# Patient Record
Sex: FEMALE | Race: WHITE | HISPANIC OR LATINO | Employment: FULL TIME | URBAN - METROPOLITAN AREA
[De-identification: names, ages, dates, MRNs, and addresses within clinical notes are randomized per-mention and may not be internally consistent; named-entity substitution may affect disease eponyms.]

---

## 2017-11-11 ENCOUNTER — HOSPITAL ENCOUNTER (EMERGENCY)
Facility: HOSPITAL | Age: 27
Discharge: HOME | End: 2017-11-11
Attending: EMERGENCY MEDICINE | Admitting: EMERGENCY MEDICINE
Payer: COMMERCIAL

## 2017-11-11 VITALS
HEART RATE: 98 BPM | TEMPERATURE: 98.9 F | OXYGEN SATURATION: 99 % | SYSTOLIC BLOOD PRESSURE: 125 MMHG | RESPIRATION RATE: 16 BRPM | DIASTOLIC BLOOD PRESSURE: 59 MMHG

## 2017-11-11 DIAGNOSIS — N72 CERVICITIS: Primary | ICD-10-CM

## 2017-11-11 LAB
AMORPH URATE CRY URNS QL MICRO: ABNORMAL /HPF
BACTERIA UR QL AUTO: ABNORMAL /HPF
BILIRUB UR QL STRIP: NEGATIVE
CLARITY UR: CLEAR
COLOR UR: YELLOW
EXT PREG TEST URINE: NEGATIVE
GLUCOSE UR STRIP-MCNC: NEGATIVE MG/DL
HGB UR QL STRIP.AUTO: ABNORMAL
KETONES UR STRIP-MCNC: NEGATIVE MG/DL
LEUKOCYTE ESTERASE UR QL STRIP: ABNORMAL
NITRITE UR QL STRIP: NEGATIVE
NON-SQ EPI CELLS URNS QL MICRO: ABNORMAL /HPF
PH UR STRIP.AUTO: 6 [PH] (ref 4.5–8)
PROT UR STRIP-MCNC: NEGATIVE MG/DL
RBC #/AREA URNS AUTO: ABNORMAL /HPF
SP GR UR STRIP.AUTO: 1.02 (ref 1–1.03)
UROBILINOGEN UR QL STRIP.AUTO: 1 E.U./DL
WBC #/AREA URNS AUTO: ABNORMAL /HPF

## 2017-11-11 PROCEDURE — 81001 URINALYSIS AUTO W/SCOPE: CPT

## 2017-11-11 PROCEDURE — 81025 URINE PREGNANCY TEST: CPT | Performed by: EMERGENCY MEDICINE

## 2017-11-11 PROCEDURE — 96372 THER/PROPH/DIAG INJ SC/IM: CPT

## 2017-11-11 PROCEDURE — 99284 EMERGENCY DEPT VISIT MOD MDM: CPT

## 2017-11-11 PROCEDURE — 87491 CHLMYD TRACH DNA AMP PROBE: CPT | Performed by: EMERGENCY MEDICINE

## 2017-11-11 PROCEDURE — 81002 URINALYSIS NONAUTO W/O SCOPE: CPT | Performed by: EMERGENCY MEDICINE

## 2017-11-11 PROCEDURE — 87591 N.GONORRHOEAE DNA AMP PROB: CPT | Performed by: EMERGENCY MEDICINE

## 2017-11-11 RX ORDER — IBUPROFEN 600 MG/1
600 TABLET ORAL EVERY 6 HOURS PRN
Qty: 30 TABLET | Refills: 0 | Status: SHIPPED | OUTPATIENT
Start: 2017-11-11 | End: 2018-07-23

## 2017-11-11 RX ORDER — AZITHROMYCIN 250 MG/1
1000 TABLET, FILM COATED ORAL ONCE
Status: COMPLETED | OUTPATIENT
Start: 2017-11-11 | End: 2017-11-11

## 2017-11-11 RX ADMIN — AZITHROMYCIN 1000 MG: 250 TABLET, FILM COATED ORAL at 12:25

## 2017-11-11 RX ADMIN — WATER 250 MG: 1 INJECTION INTRAMUSCULAR; INTRAVENOUS; SUBCUTANEOUS at 12:24

## 2017-11-11 NOTE — ED PROVIDER NOTES
History  Chief Complaint   Patient presents with    Abdominal Pain     lower abd pain with vaginal discharge x4days  History provided by:  Patient   used: No    Vaginal Discharge   Quality:  Malodorous and white  Severity:  Moderate  Onset quality:  Sudden  Duration:  4 days  Timing:  Constant  Progression:  Unchanged  Chronicity:  New  Context: spontaneously    Relieved by:  Nothing  Worsened by:  Nothing  Ineffective treatments:  None tried  Associated symptoms: abdominal pain    Associated symptoms: no dysuria, no fever, no genital lesions, no nausea, no rash, no urinary frequency, no vaginal itching and no vomiting    Risk factors: unprotected sex    Risk factors comment:  Prior chlamydia 6 years ago  Prior to Admission Medications   Prescriptions Last Dose Informant Patient Reported? Taking? diphenhydrAMINE (BENADRYL) 25 mg tablet   No No   Sig: Take 1 tablet by mouth every 6 (six) hours as needed for itching for up to 30 days   famotidine (PEPCID) 20 mg tablet   No No   Sig: Take 1 tablet by mouth 2 (two) times a day   hydrocortisone 1 % cream   No No   Sig: Apply 1 application topically 2 (two) times a day      Facility-Administered Medications: None       Past Medical History:   Diagnosis Date    No known health problems        Past Surgical History:   Procedure Laterality Date    NO PAST SURGERIES         History reviewed  No pertinent family history  I have reviewed and agree with the history as documented  Social History   Substance Use Topics    Smoking status: Current Every Day Smoker     Packs/day: 0 20    Smokeless tobacco: Never Used    Alcohol use Yes      Comment: ocassionl        Review of Systems   Constitutional: Negative for chills, diaphoresis and fever  HENT: Negative for congestion and sore throat  Respiratory: Negative for cough, chest tightness and shortness of breath  Cardiovascular: Negative for chest pain     Gastrointestinal: Positive for abdominal pain  Negative for diarrhea, nausea and vomiting  Genitourinary: Positive for pelvic pain and vaginal discharge  Negative for difficulty urinating, dysuria, flank pain and vaginal bleeding  Skin: Negative for rash  Neurological: Negative for headaches  All other systems reviewed and are negative  Physical Exam  ED Triage Vitals [11/11/17 1120]   Temperature Pulse Respirations Blood Pressure SpO2   98 9 °F (37 2 °C) 98 16 125/59 99 %      Temp Source Heart Rate Source Patient Position - Orthostatic VS BP Location FiO2 (%)   Temporal -- Sitting Right arm --      Pain Score       7           Orthostatic Vital Signs  Vitals:    11/11/17 1120   BP: 125/59   Pulse: 98   Patient Position - Orthostatic VS: Sitting       Physical Exam   Constitutional: She appears well-developed and well-nourished  She is cooperative  Non-toxic appearance  She does not have a sickly appearance  She does not appear ill  No distress  HENT:   Head: Normocephalic and atraumatic  Right Ear: Hearing normal  No drainage or swelling  Left Ear: Hearing normal  No drainage or swelling  Mouth/Throat: Mucous membranes are normal    Eyes: Conjunctivae and lids are normal  Right eye exhibits no discharge  Left eye exhibits no discharge  Neck: Trachea normal and normal range of motion  No JVD present  Cardiovascular: Normal rate and normal pulses  Pulmonary/Chest: Effort normal  No stridor  No respiratory distress  Abdominal: Soft  Normal appearance  She exhibits no ascites and no mass  There is no hepatosplenomegaly  There is no tenderness  There is no rebound, no guarding and no CVA tenderness  Genitourinary: Uterus normal  Pelvic exam was performed with patient supine  There is no rash, tenderness, lesion or injury on the right labia  There is no rash, tenderness, lesion or injury on the left labia  Cervix exhibits discharge and friability  Cervix exhibits no motion tenderness   No tenderness in the vagina  No vaginal discharge found  Musculoskeletal: Normal range of motion  She exhibits no edema, tenderness or deformity  Lymphadenopathy:        Right: No inguinal adenopathy present  Left: No inguinal adenopathy present  Neurological: She is alert  She has normal strength  No cranial nerve deficit or sensory deficit  She exhibits normal muscle tone  Coordination and gait normal  GCS eye subscore is 4  GCS verbal subscore is 5  GCS motor subscore is 6  Skin: Skin is warm, dry and intact  No rash noted  She is not diaphoretic  No pallor  Psychiatric: She has a normal mood and affect  Her speech is normal and behavior is normal  Judgment and thought content normal  Cognition and memory are normal    Nursing note and vitals reviewed        ED Medications  Medications   cefTRIAXone (ROCEPHIN) 250 mg in sterile water IM only syringe (not administered)   azithromycin (ZITHROMAX) tablet 1,000 mg (not administered)       Diagnostic Studies  Results Reviewed     Procedure Component Value Units Date/Time    Urine Microscopic [[id number]]  (Abnormal) Collected:  11/11/17 1257    Lab Status:  Final result Specimen:  Urine from Urine, Clean Catch Updated:  11/11/17 1203     RBC, UA 1-2 (A) /hpf      WBC, UA 0-1 (A) /hpf      Epithelial Cells Innumerable (A) /hpf      Bacteria, UA Innumerable (A) /hpf      AMORPH URATES Occasional /hpf     Chlamydia/GC amplified DNA by PCR [[id number]] Collected:  11/11/17 1200    Lab Status:  No result Specimen:  Genital from Cervix     POCT urinalysis dipstick [[id number]]  (Abnormal) Resulted:  11/11/17 1144    Lab Status:  Final result Updated:  11/11/17 1144    POCT pregnancy, urine [[id number]]  (Normal) Resulted:  11/11/17 1144    Lab Status:  Final result Updated:  11/11/17 1144     EXT PREG TEST UR (Ref: Negative) NEGATIVE    ED Urine Macroscopic [[id number]]  (Abnormal) Collected:  11/11/17 1257    Lab Status:  Final result Specimen:  Urine Updated:  11/11/17 1142     Color, UA Yellow     Clarity, UA Clear     pH, UA 6 0     Leukocytes, UA Trace (A)     Nitrite, UA Negative     Protein, UA Negative mg/dl      Glucose, UA Negative mg/dl      Ketones, UA Negative mg/dl      Urobilinogen, UA 1 0 E U /dl      Bilirubin, UA Negative     Blood, UA Trace (A)     Specific [place], UA 1 020    Narrative:       CLINITEK RESULT                 No orders to display              Procedures  Procedures       Phone Contacts  ED Phone Contact    ED Course  ED Course                                MDM  Number of Diagnoses or Management Options  Cervicitis:   Diagnosis management comments: There is no odor to the discharge  Cervix is friable and there is a whitish discharge  No cervical motion tenderness  Treat presumptively for possibility of chlamydia or gonorrhea  Ibuprofen  Abdominal exam is benign the patient is afebrile nontoxic-appearing  She has an OBGYN doctor to follow up with at [facility] and but I gave her the number to the [facility] for follow-up as needed  Cultures are pending and she is aware that they can take a few days to come back  Amount and/or Complexity of Data Reviewed  Clinical lab tests: ordered and reviewed    Patient Progress  Patient progress: stable    CritCare Time    Disposition  Final diagnoses:   Cervicitis     Time reflects when diagnosis was documented in both MDM as applicable and the Disposition within this note     Time User Action Codes Description Comment    11/11/2017 11:58 AM Joaquin Seth Add [N72] Cervicitis       ED Disposition     ED Disposition Condition Comment    Discharge  Krzysztof Cobian discharge to home/self care      Condition at discharge: Good        Follow-up Information     Follow up With Specialties Details Why [address]   Schedule an appointment as soon as possible for a visit in 2 days If symptoms worsen [address] [address] [phone] Patient's Medications   Discharge Prescriptions    IBUPROFEN (MOTRIN) 600 MG TABLET    Take 1 tablet by mouth every 6 (six) hours as needed for moderate pain for up to 30 doses       Start Date: 11/11/2017End Date: --       Order Dose: 600 mg       Quantity: 30 tablet    Refills: 0     No discharge procedures on file      ED Provider  Electronically Signed by           Sarah Warner MD  11/11/17 6226

## 2017-11-11 NOTE — ED NOTES
Pelvic is set up and dr Marsha Breen at bedside      [facility]  11/11/17 0095

## 2017-11-11 NOTE — DISCHARGE INSTRUCTIONS
Cervicitis   WHAT YOU NEED TO KNOW:   Cervicitis inflammation of your cervix  Your cervix is at the bottom of your uterus where it opens into your vagina  DISCHARGE INSTRUCTIONS:   Medicines:   · Antibiotics: This medicine helps kill the bacteria causing cervicitis  Take them as directed  · Take your medicine as directed  Contact your healthcare provider if you think your medicine is not helping or if you have side effects  Tell him of her if you are allergic to any medicine  Keep a list of the medicines, vitamins, and herbs you take  Include the amounts, and when and why you take them  Bring the list or the pill bottles to follow-up visits  Carry your medicine list with you in case of an emergency  Follow up with your healthcare provider or gynecologist as directed: You may need to return to have your cervix checked or more tests done  Write down your questions so you remember to ask them during your visits  Activity:  You may need to stop having sex until after you finish taking medicine to treat your condition  If you had other procedures to treat your condition, you may need to stop having sex for some time  Ask your healthcare provider or gynecologist when you can have sex or return to your normal activities  Prevent cervicitis:   · Avoid products that can cause irritation:  Do not douche unless your healthcare provider or gynecologist has told you to  Do not use spermicides if they caused symptoms in the past      · Use a condom:  Use a latex condom every time you have sex  If you are allergic to latex, use a nonlatex condom  · Limit your sexual partners: Your risk of getting an STI is decreased if you have fewer sexual partners  Do not have sex with someone who has or is being treated for a STI  · Talk to your sexual partners: If you have a STI, tell your recent sexual partners  Tell them to see a healthcare provider for testing and treatment to help stop the spread of infection    Contact your healthcare provider or gynecologist if:   · You are spotting blood from your vagina and it is not time for your period  · You have yellow or green discharge coming from your vagina after you start treatment  · You have abdominal pain  · You have a fever  · You think or know you are pregnant  · Your symptoms do not go away 2 to 4 weeks after treatment  · You have questions or concerns about your condition or care  Return to the emergency department if:   · You have bleeding from your vagina that does not stop and it is not time for your period  © 2017 [address] [facility] Information is for End User's use only and may not be sold, redistributed or otherwise used for commercial purposes  All illustrations and images included in CareNotes® are the copyrighted property of [employer]  or [employer]  The above information is an  only  It is not intended as medical advice for individual conditions or treatments  Talk to your doctor, nurse or pharmacist before following any medical regimen to see if it is safe and effective for you

## 2017-11-13 ENCOUNTER — GENERIC CONVERSION - ENCOUNTER (OUTPATIENT)
Dept: OTHER | Facility: OTHER | Age: 27
End: 2017-11-13

## 2017-11-13 LAB
CHLAMYDIA DNA CVX QL NAA+PROBE: NORMAL
N GONORRHOEA DNA GENITAL QL NAA+PROBE: NORMAL

## 2017-12-13 ENCOUNTER — APPOINTMENT (EMERGENCY)
Dept: RADIOLOGY | Facility: HOSPITAL | Age: 27
End: 2017-12-13
Payer: COMMERCIAL

## 2017-12-13 ENCOUNTER — HOSPITAL ENCOUNTER (EMERGENCY)
Facility: HOSPITAL | Age: 27
Discharge: HOME | End: 2017-12-13
Payer: COMMERCIAL

## 2017-12-13 VITALS
HEART RATE: 93 BPM | DIASTOLIC BLOOD PRESSURE: 57 MMHG | TEMPERATURE: 98.2 F | SYSTOLIC BLOOD PRESSURE: 120 MMHG | WEIGHT: 110 LBS | RESPIRATION RATE: 16 BRPM | OXYGEN SATURATION: 99 %

## 2017-12-13 DIAGNOSIS — S93.401A RIGHT ANKLE SPRAIN: Primary | ICD-10-CM

## 2017-12-13 PROCEDURE — 73610 X-RAY EXAM OF ANKLE: CPT

## 2017-12-13 PROCEDURE — 99283 EMERGENCY DEPT VISIT LOW MDM: CPT

## 2017-12-13 RX ORDER — ACETAMINOPHEN 325 MG/1
650 TABLET ORAL ONCE
Status: COMPLETED | OUTPATIENT
Start: 2017-12-13 | End: 2017-12-13

## 2017-12-13 RX ADMIN — ACETAMINOPHEN 650 MG: 325 TABLET, FILM COATED ORAL at 19:49
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
## 2017-12-14 NOTE — DISCHARGE INSTRUCTIONS

## 2018-01-17 NOTE — MISCELLANEOUS
Message  patient recently seen in our ED for questionable STD expsoure  she has an OBGYN doctor at [facility]  she is to call and make an apt with them      Signatures   Electronically signed by : Carolina Carbajal RN; Nov 13 2017  9:56AM EST                       (Author)

## 2018-07-23 ENCOUNTER — HOSPITAL ENCOUNTER (EMERGENCY)
Facility: HOSPITAL | Age: 28
Discharge: HOME | End: 2018-07-23
Attending: EMERGENCY MEDICINE | Admitting: EMERGENCY MEDICINE
Payer: COMMERCIAL

## 2018-07-23 ENCOUNTER — APPOINTMENT (EMERGENCY)
Dept: CT IMAGING | Facility: HOSPITAL | Age: 28
End: 2018-07-23
Payer: COMMERCIAL

## 2018-07-23 VITALS
TEMPERATURE: 96.9 F | OXYGEN SATURATION: 99 % | RESPIRATION RATE: 16 BRPM | SYSTOLIC BLOOD PRESSURE: 105 MMHG | DIASTOLIC BLOOD PRESSURE: 66 MMHG | HEART RATE: 76 BPM | WEIGHT: 114.19 LBS

## 2018-07-23 DIAGNOSIS — N39.0 URINARY TRACT INFECTION: ICD-10-CM

## 2018-07-23 DIAGNOSIS — G43.909 MIGRAINE WITHOUT STATUS MIGRAINOSUS, NOT INTRACTABLE, UNSPECIFIED MIGRAINE TYPE: Primary | ICD-10-CM

## 2018-07-23 LAB
ALBUMIN SERPL BCP-MCNC: 4.6 G/DL (ref 3–5.2)
ALP SERPL-CCNC: 53 U/L (ref 43–122)
ALT SERPL W P-5'-P-CCNC: 52 U/L (ref 9–52)
ANION GAP SERPL CALCULATED.3IONS-SCNC: 9 MMOL/L (ref 5–14)
AST SERPL W P-5'-P-CCNC: 35 U/L (ref 14–36)
BACTERIA UR QL AUTO: ABNORMAL /HPF
BASOPHILS # BLD AUTO: 0 THOUSANDS/ΜL (ref 0–0.1)
BASOPHILS NFR BLD AUTO: 1 % (ref 0–1)
BILIRUB SERPL-MCNC: 0.6 MG/DL
BILIRUB UR QL STRIP: NEGATIVE
BUN SERPL-MCNC: 12 MG/DL (ref 5–25)
CALCIUM SERPL-MCNC: 9.4 MG/DL (ref 8.4–10.2)
CHLORIDE SERPL-SCNC: 107 MMOL/L (ref 97–108)
CLARITY UR: ABNORMAL
CO2 SERPL-SCNC: 25 MMOL/L (ref 22–30)
COLOR UR: YELLOW
CREAT SERPL-MCNC: 0.68 MG/DL (ref 0.6–1.2)
EOSINOPHIL # BLD AUTO: 0.1 THOUSAND/ΜL (ref 0–0.4)
EOSINOPHIL NFR BLD AUTO: 3 % (ref 0–6)
ERYTHROCYTE [DISTWIDTH] IN BLOOD BY AUTOMATED COUNT: 13.1 %
EXT PREG TEST URINE: NEGATIVE
GFR SERPL CREATININE-BSD FRML MDRD: 120 ML/MIN/1.73SQ M
GLUCOSE SERPL-MCNC: 95 MG/DL (ref 70–99)
GLUCOSE UR STRIP-MCNC: NEGATIVE MG/DL
HCT VFR BLD AUTO: 42.7 % (ref 36–46)
HGB BLD-MCNC: 14.6 G/DL (ref 12–16)
HGB UR QL STRIP.AUTO: NEGATIVE
KETONES UR STRIP-MCNC: NEGATIVE MG/DL
LEUKOCYTE ESTERASE UR QL STRIP: 500
LYMPHOCYTES # BLD AUTO: 1.7 THOUSANDS/ΜL (ref 0.5–4)
LYMPHOCYTES NFR BLD AUTO: 33 % (ref 20–50)
MCH RBC QN AUTO: 30.3 PG (ref 26–34)
MCHC RBC AUTO-ENTMCNC: 34.2 G/DL (ref 31–36)
MCV RBC AUTO: 89 FL (ref 80–100)
MONOCYTES # BLD AUTO: 0.5 THOUSAND/ΜL (ref 0.2–0.9)
MONOCYTES NFR BLD AUTO: 9 % (ref 1–10)
MUCOUS THREADS UR QL AUTO: ABNORMAL
NEUTROPHILS # BLD AUTO: 2.8 THOUSANDS/ΜL (ref 1.8–7.8)
NEUTS SEG NFR BLD AUTO: 54 % (ref 45–65)
NITRITE UR QL STRIP: NEGATIVE
NON-SQ EPI CELLS URNS QL MICRO: ABNORMAL /HPF
PH UR STRIP.AUTO: 5 [PH] (ref 4.5–8)
PLATELET # BLD AUTO: 169 THOUSANDS/UL (ref 150–450)
PMV BLD AUTO: 9.3 FL (ref 8.9–12.7)
POTASSIUM SERPL-SCNC: 3.8 MMOL/L (ref 3.6–5)
PROT SERPL-MCNC: 7.9 G/DL (ref 5.9–8.4)
PROT UR STRIP-MCNC: NEGATIVE MG/DL
RBC # BLD AUTO: 4.82 MILLION/UL (ref 4–5.2)
RBC #/AREA URNS AUTO: ABNORMAL /HPF
SODIUM SERPL-SCNC: 141 MMOL/L (ref 137–147)
SP GR UR STRIP.AUTO: 1.02 (ref 1–1.04)
UROBILINOGEN UA: NEGATIVE MG/DL
WBC # BLD AUTO: 5.2 THOUSAND/UL (ref 4.5–11)
WBC #/AREA URNS AUTO: ABNORMAL /HPF

## 2018-07-23 PROCEDURE — 85025 COMPLETE CBC W/AUTO DIFF WBC: CPT | Performed by: PHYSICIAN ASSISTANT

## 2018-07-23 PROCEDURE — 81025 URINE PREGNANCY TEST: CPT | Performed by: PHYSICIAN ASSISTANT

## 2018-07-23 PROCEDURE — 96365 THER/PROPH/DIAG IV INF INIT: CPT

## 2018-07-23 PROCEDURE — 96375 TX/PRO/DX INJ NEW DRUG ADDON: CPT

## 2018-07-23 PROCEDURE — 80053 COMPREHEN METABOLIC PANEL: CPT | Performed by: PHYSICIAN ASSISTANT

## 2018-07-23 PROCEDURE — 36415 COLL VENOUS BLD VENIPUNCTURE: CPT | Performed by: PHYSICIAN ASSISTANT

## 2018-07-23 PROCEDURE — 70450 CT HEAD/BRAIN W/O DYE: CPT

## 2018-07-23 PROCEDURE — 99284 EMERGENCY DEPT VISIT MOD MDM: CPT

## 2018-07-23 PROCEDURE — 81003 URINALYSIS AUTO W/O SCOPE: CPT | Performed by: PHYSICIAN ASSISTANT

## 2018-07-23 PROCEDURE — 81001 URINALYSIS AUTO W/SCOPE: CPT | Performed by: PHYSICIAN ASSISTANT

## 2018-07-23 PROCEDURE — 87086 URINE CULTURE/COLONY COUNT: CPT | Performed by: PHYSICIAN ASSISTANT

## 2018-07-23 PROCEDURE — 96366 THER/PROPH/DIAG IV INF ADDON: CPT

## 2018-07-23 RX ORDER — MAGNESIUM SULFATE HEPTAHYDRATE 40 MG/ML
2 INJECTION, SOLUTION INTRAVENOUS ONCE
Status: COMPLETED | OUTPATIENT
Start: 2018-07-23 | End: 2018-07-23

## 2018-07-23 RX ORDER — METOCLOPRAMIDE HYDROCHLORIDE 5 MG/ML
10 INJECTION INTRAMUSCULAR; INTRAVENOUS ONCE
Status: COMPLETED | OUTPATIENT
Start: 2018-07-23 | End: 2018-07-23

## 2018-07-23 RX ORDER — KETOROLAC TROMETHAMINE 30 MG/ML
INJECTION, SOLUTION INTRAMUSCULAR; INTRAVENOUS
Status: COMPLETED
Start: 2018-07-23 | End: 2018-07-23

## 2018-07-23 RX ORDER — DIPHENHYDRAMINE HYDROCHLORIDE 50 MG/ML
INJECTION INTRAMUSCULAR; INTRAVENOUS
Status: COMPLETED
Start: 2018-07-23 | End: 2018-07-23

## 2018-07-23 RX ORDER — DIPHENHYDRAMINE HYDROCHLORIDE 50 MG/ML
25 INJECTION INTRAMUSCULAR; INTRAVENOUS ONCE
Status: COMPLETED | OUTPATIENT
Start: 2018-07-23 | End: 2018-07-23

## 2018-07-23 RX ORDER — METOCLOPRAMIDE HYDROCHLORIDE 5 MG/ML
INJECTION INTRAMUSCULAR; INTRAVENOUS
Status: COMPLETED
Start: 2018-07-23 | End: 2018-07-23

## 2018-07-23 RX ORDER — CEPHALEXIN 500 MG
500 CAPSULE ORAL 3 TIMES DAILY
Qty: 21 CAPSULE | Refills: 0 | Status: SHIPPED | OUTPATIENT
Start: 2018-07-23 | End: 2018-07-30

## 2018-07-23 RX ORDER — KETOROLAC TROMETHAMINE 30 MG/ML
30 INJECTION, SOLUTION INTRAMUSCULAR; INTRAVENOUS ONCE
Status: COMPLETED | OUTPATIENT
Start: 2018-07-23 | End: 2018-07-23

## 2018-07-23 RX ORDER — MAGNESIUM SULFATE HEPTAHYDRATE 40 MG/ML
INJECTION, SOLUTION INTRAVENOUS
Status: COMPLETED
Start: 2018-07-23 | End: 2018-07-23

## 2018-07-23 RX ADMIN — KETOROLAC TROMETHAMINE 30 MG: 30 INJECTION, SOLUTION INTRAMUSCULAR; INTRAVENOUS at 11:51

## 2018-07-23 RX ADMIN — METOCLOPRAMIDE HYDROCHLORIDE 10 MG: 5 INJECTION INTRAMUSCULAR; INTRAVENOUS at 11:50

## 2018-07-23 RX ADMIN — MAGNESIUM SULFATE HEPTAHYDRATE 2 G: 40 INJECTION, SOLUTION INTRAVENOUS at 11:50

## 2018-07-23 RX ADMIN — DIPHENHYDRAMINE HYDROCHLORIDE 25 MG: 50 INJECTION INTRAMUSCULAR; INTRAVENOUS at 11:50

## 2018-07-23 RX ADMIN — SODIUM CHLORIDE 1000 ML: 9 INJECTION, SOLUTION INTRAVENOUS at 11:50

## 2018-07-23 RX ADMIN — METOCLOPRAMIDE 10 MG: 5 INJECTION, SOLUTION INTRAMUSCULAR; INTRAVENOUS at 11:50

## 2018-07-23 NOTE — ED NOTES
20 g IV to right AC removed, catheter intact, dsd applied, no redness/swelling at site     [facility], [address] [place] [address]  07/23/18 2905

## 2018-07-23 NOTE — DISCHARGE INSTRUCTIONS
Urinary Tract Infection in Women   WHAT YOU NEED TO KNOW:   A urinary tract infection (UTI) is caused by bacteria that get inside your urinary tract  Most bacteria that enter your urinary tract come out when you urinate  If the bacteria stay in your urinary tract, you may get an infection  Your urinary tract includes your kidneys, ureters, bladder, and urethra  Urine is made in your kidneys, and it flows from the ureters to the bladder  Urine leaves the bladder through the urethra  A UTI is more common in your lower urinary tract, which includes your bladder and urethra  DISCHARGE INSTRUCTIONS:   Return to the emergency department if:   · You are urinating very little or not at all  · You have a high fever with shaking chills  · You have side or back pain that gets worse  Contact your healthcare provider if:   · You have a fever  · You do not feel better after 2 days of taking antibiotics  · You are vomiting  · You have questions or concerns about your condition or care  Medicines:   · Antibiotics  help fight a bacterial infection  · Medicines  may be given to decrease pain and burning when you urinate  They will also help decrease the feeling that you need to urinate often  These medicines will make your urine orange or red  · Take your medicine as directed  Contact your healthcare provider if you think your medicine is not helping or if you have side effects  Tell him or her if you are allergic to any medicine  Keep a list of the medicines, vitamins, and herbs you take  Include the amounts, and when and why you take them  Bring the list or the pill bottles to follow-up visits  Carry your medicine list with you in case of an emergency  Follow up with your healthcare provider as directed:  Write down your questions so you remember to ask them during your visits  Prevent another UTI:   · Empty your bladder often    Urinate and empty your bladder as soon as you feel the need  Do not hold your urine for long periods of time  · Wipe from front to back after you urinate or have a bowel movement  This will help prevent germs from getting into your urinary tract through your urethra  · Drink liquids as directed  Ask how much liquid to drink each day and which liquids are best for you  You may need to drink more liquids than usual to help flush out the bacteria  Do not drink alcohol, caffeine, or citrus juices  These can irritate your bladder and increase your symptoms  Your healthcare provider may recommend cranberry juice to help prevent a UTI  · Urinate after you have sex  This can help flush out bacteria passed during sex  · Do not douche or use feminine deodorants  These can change the chemical balance in your vagina  · Change sanitary pads or tampons often  This will help prevent germs from getting into your urinary tract  · Do pelvic muscle exercises often  Pelvic muscle exercises may help you start and stop urinating  Strong pelvic muscles may help you empty your bladder easier  Squeeze these muscles tightly for 5 seconds like you are trying to hold back urine  Then relax for 5 seconds  Gradually work up to squeezing for 10 seconds  Do 3 sets of 15 repetitions a day, or as directed  © 2017 [address] [facility] Information is for End User's use only and may not be sold, redistributed or otherwise used for commercial purposes  All illustrations and images included in CareNotes® are the copyrighted property of [employer]  or Alexis Swan  The above information is an  only  It is not intended as medical advice for individual conditions or treatments  Talk to your doctor, nurse or pharmacist before following any medical regimen to see if it is safe and effective for you  Migraine Headache   WHAT YOU NEED TO KNOW:   A migraine is a severe headache   The pain can be so severe that it interferes with your daily activities  A migraine can last a few hours up to several days  The exact cause of migraines is not known  DISCHARGE INSTRUCTIONS:   Return to the emergency department if:   · You have a headache that seems different or much worse than your usual migraine headache  · You have a severe headache with a fever or a stiff neck  · You have new problems with speech, vision, balance, or movement  · You feel like you are going to faint, you become confused, or you have a seizure  Contact your healthcare provider or neurologist if:   · Your migraines interfere with your daily activities  · Your medicines or treatments stop working  · You have questions or concerns about your condition or care  Medicines: You may need any of the following  Take medicine as soon as you feel a migraine begin  · Prescription pain medicine  may be given  Do not wait until the pain is severe before you take your medicine  · Migraine medicines  are used to help prevent a migraine or stop it once it starts  · Antinausea medicine  may be given to calm your stomach and to help prevent vomiting  This medicine can also help relieve pain  · Take your medicine as directed  Contact your healthcare provider if you think your medicine is not helping or if you have side effects  Tell him or her if you are allergic to any medicine  Keep a list of the medicines, vitamins, and herbs you take  Include the amounts, and when and why you take them  Bring the list or the pill bottles to follow-up visits  Carry your medicine list with you in case of an emergency  Manage your symptoms:   · Rest in a dark, quiet room  This will help decrease your pain  Sleep may also help relieve the pain  · Apply ice to decrease pain  Use an ice pack, or put crushed ice in a plastic bag  Cover the ice pack with a towel and place it on your head  Apply ice for 15 to 20 minutes every hour  · Apply heat to decrease pain and muscle spasms    Use a small towel dampened with warm water or a heating pad, or sit in a warm bath  Apply heat on the area for 20 to 30 minutes every 2 hours  You may alternate heat and ice  · Keep a migraine record  Write down when your migraines start and stop  Include your symptoms and what you were doing when a migraine began  Record what you ate or drank for 24 hours before the migraine started  Keep track of what you did to treat your migraine and if it worked  Bring the migraine record with you to visits with your healthcare provider  Follow up with your healthcare provider or neurologist as directed:  Bring your migraine record with you  Write down your questions so you remember to ask them during your visits  Prevent another migraine:   · Do not smoke  Nicotine and other chemicals in cigarettes and cigars can trigger a migraine or make it worse  Ask your healthcare provider for information if you currently smoke and need help to quit  E-cigarettes or smokeless tobacco still contain nicotine  Talk to your healthcare provider before you use these products  · Do not drink alcohol  Alcohol can trigger a migraine  It can also keep medicines used to treat your migraines from working  · Get regular exercise  Exercise may help prevent migraines  Talk to your healthcare provider about the best exercise plan for you  Try to get at least 30 minutes of exercise on most days  · Manage stress  Stress may trigger a migraine  Learn new ways to relax, such as deep breathing  · Create a sleep schedule  Go to bed and get up at the same times each day  Do not watch television before bed  · Eat regular meals  Include healthy foods such as include fruit, vegetables, whole-grain breads, low-fat dairy products, beans, lean meat, and fish  Do not have food or drinks that trigger your migraines    © 2017 [address] [employer] Information is for End User's use only and may not be sold, redistributed or otherwise used for commercial purposes  All illustrations and images included in CareNotes® are the copyrighted property of A D A M [employer]  or Alexis Swan  The above information is an  only  It is not intended as medical advice for individual conditions or treatments  Talk to your doctor, nurse or pharmacist before following any medical regimen to see if it is safe and effective for you

## 2018-07-23 NOTE — ED PROVIDER NOTES
History  Chief Complaint   Patient presents with    Headache     I have headaches, but the last 2 months they have been worse; I get them about every other week and this started 2 days now in my forehead and back of head  Brightness of lights brother me  50-year-old female presents to the emergency department with complaints of headache  States that she has been getting intermittent headaches over the past 2 months  Presently complains of a throbbing headache in the occipital and frontal areas of the head that she has had over the past 2 days  Taking Tylenol at home without relief of symptoms  Also states that she has been trying to sleep as this usually relieves the pain but over the past 2 days she has not had any success  She denies any head injuries  She denies any recent fevers or neck pain  Complaining associated sensitivity to light and sound  Denies nausea or vomiting  History provided by:  Patient   used: No        None       Past Medical History:   Diagnosis Date    No known health problems        Past Surgical History:   Procedure Laterality Date    NO PAST SURGERIES         History reviewed  No pertinent family history  I have reviewed and agree with the history as documented  Social History   Substance Use Topics    Smoking status: Current Every Day Smoker     Packs/day: 0 00    Smokeless tobacco: Never Used    Alcohol use No      Comment: ocassionl        Review of Systems   Constitutional: Negative for activity change, appetite change, chills and fever  HENT: Negative for congestion, dental problem, drooling, ear discharge, ear pain, mouth sores, nosebleeds, rhinorrhea, sore throat and trouble swallowing  Eyes: Positive for photophobia  Negative for pain, discharge and itching  Respiratory: Negative for cough, chest tightness, shortness of breath and wheezing  Cardiovascular: Negative for chest pain and palpitations     Gastrointestinal: Negative for abdominal pain, blood in stool, constipation, diarrhea, nausea and vomiting  Endocrine: Negative for cold intolerance and heat intolerance  Genitourinary: Negative for difficulty urinating, dysuria, flank pain, frequency and urgency  Skin: Negative for rash and wound  Allergic/Immunologic: Negative for food allergies and immunocompromised state  Neurological: Positive for headaches  Negative for dizziness, seizures, syncope, weakness and numbness  Psychiatric/Behavioral: Negative for agitation, behavioral problems and confusion  Physical Exam  Physical Exam   Constitutional: She is oriented to person, place, and time  Vital signs are normal  She appears well-developed and well-nourished  No distress  HENT:   Head: Normocephalic and atraumatic  Right Ear: Hearing, tympanic membrane, external ear and ear canal normal    Left Ear: Hearing, tympanic membrane, external ear and ear canal normal    Nose: Nose normal    Mouth/Throat: Uvula is midline and oropharynx is clear and moist  No oropharyngeal exudate  Eyes: Conjunctivae, EOM and lids are normal  Pupils are equal, round, and reactive to light  Cardiovascular: Normal rate and regular rhythm  Exam reveals no gallop and no friction rub  No murmur heard  Pulmonary/Chest: Effort normal and breath sounds normal  No respiratory distress  She has no wheezes  She has no rhonchi  She has no rales  She exhibits no tenderness  Musculoskeletal: Normal range of motion  Neurological: She is alert and oriented to person, place, and time  Skin: Skin is warm and dry  She is not diaphoretic  Psychiatric: She has a normal mood and affect  Her behavior is normal    Vitals reviewed        Vital Signs  ED Triage Vitals [07/23/18 1123]   Temperature Pulse Respirations Blood Pressure SpO2   (!) 96 9 °F (36 1 °C) 88 16 105/66 98 %      Temp Source Heart Rate Source Patient Position - Orthostatic VS BP Location FiO2 (%)   Temporal Monitor Sitting Left arm --      Pain Score       8           Vitals:    07/23/18 1123   BP: 105/66   Pulse: 88   Patient Position - Orthostatic VS: Sitting       Visual Acuity      ED Medications  Medications   sodium chloride 0 9 % bolus 1,000 mL (0 mL Intravenous Stopped 7/23/18 1255)   diphenhydrAMINE (BENADRYL) injection 25 mg (25 mg Intravenous Given 7/23/18 1150)   metoclopramide (REGLAN) injection 10 mg (10 mg Intravenous Given 7/23/18 1150)   ketorolac (TORADOL) injection 30 mg (30 mg Intravenous Given 7/23/18 1151)   magnesium sulfate 2 g/50 mL IVPB (premix) 2 g (2 g Intravenous New Bag 7/23/18 1150)       Diagnostic Studies  Results Reviewed     Procedure Component Value Units Date/Time    Comprehensive metabolic panel [[id number]]  (Normal) Collected:  07/23/18 1142    Lab Status:  Final result Specimen:  Blood from Arm, Right Updated:  07/23/18 1200     Sodium 141 mmol/L      Potassium 3 8 mmol/L      Chloride 107 mmol/L      CO2 25 mmol/L      Anion Gap 9 mmol/L      BUN 12 mg/dL      Creatinine 0 68 mg/dL      Glucose 95 mg/dL      Calcium 9 4 mg/dL      AST 35 U/L      ALT 52 U/L      Alkaline Phosphatase 53 U/L      Total Protein 7 9 g/dL      Albumin 4 6 g/dL      Total Bilirubin 0 60 mg/dL      eGFR 120 ml/min/1 73sq m     Narrative:         National Kidney Disease Education Program recommendations are as follows:  GFR calculation is accurate only with a steady state creatinine  Chronic Kidney disease less than 60 ml/min/1 73 sq  meters  Kidney failure less than 15 ml/min/1 73 sq  meters  Urine Microscopic [[id number]]  (Abnormal) Collected:  07/23/18 1142    Lab Status:  Final result Specimen:  Urine from Urine, Clean Catch Updated:  07/23/18 1158     RBC, UA None Seen /hpf      WBC, UA 10-20 (A) /hpf      Epithelial Cells Moderate (A) /hpf      Bacteria, UA Moderate (A) /hpf      MUCOUS THREADS Moderate (A)    Urine culture [[id number]] Collected:  07/23/18 1142    Lab Status:   In process Specimen:  Urine from Urine, Clean Catch Updated:  07/23/18 1158    CBC and differential [[id number]]  (Normal) Collected:  07/23/18 1142    Lab Status:  Final result Specimen:  Blood from Arm, Right Updated:  07/23/18 1151     WBC 5 20 Thousand/uL      RBC 4 82 Million/uL      Hemoglobin 14 6 g/dL      Hematocrit 42 7 %      MCV 89 fL      MCH 30 3 pg      MCHC 34 2 g/dL      RDW 13 1 %      MPV 9 3 fL      Platelets 052 Thousands/uL      Neutrophils Relative 54 %      Lymphocytes Relative 33 %      Monocytes Relative 9 %      Eosinophils Relative 3 %      Basophils Relative 1 %      Neutrophils Absolute 2 80 Thousands/µL      Lymphocytes Absolute 1 70 Thousands/µL      Monocytes Absolute 0 50 Thousand/µL      Eosinophils Absolute 0 10 Thousand/µL      Basophils Absolute 0 00 Thousands/µL     UA w Reflex to Microscopic w Reflex to Culture [[id number]]  (Abnormal) Collected:  07/23/18 1142    Lab Status:  Final result Specimen:  Urine from Urine, Clean Catch Updated:  07/23/18 1150     Color, UA Yellow     Clarity, UA Slightly Cloudy (A)     Specific Gravity, UA 1 020     pH, UA 5 0     Leukocytes,  0 (A)     Nitrite, UA Negative     Protein, UA Negative mg/dl      Glucose, UA Negative mg/dl      Ketones, UA Negative mg/dl      Bilirubin, UA Negative     Blood, UA Negative     UROBILINOGEN UA Negative mg/dL     POCT pregnancy, urine [[id number]]  (Normal) Resulted:  07/23/18 1143    Lab Status:  Final result Updated:  07/23/18 1143     EXT PREG TEST UR (Ref: Negative) Negative                 CT head without contrast   Final Result by Abdoulaye Barrios DO (07/23 1325)   No acute intracranial abnormality                    Workstation performed: [id number]                    Procedures  Procedures       Phone Contacts  ED Phone Contact    ED Course                               MDM  Number of Diagnoses or Management Options  Migraine without status migrainosus, not intractable, unspecified migraine type:   Urinary tract infection: Diagnosis management comments: Differential diagnosis includes but not limited to:  Migraine headache, tension headache, sinusitis        Amount and/or Complexity of Data Reviewed  Clinical lab tests: ordered  Tests in the radiology section of CPT®: ordered and reviewed      CritCare Time    Disposition  Final diagnoses:   Migraine without status migrainosus, not intractable, unspecified migraine type   Urinary tract infection     Time reflects when diagnosis was documented in both MDM as applicable and the Disposition within this note     Time User Action Codes Description Comment    7/23/2018  1:30 PM Yesika Tierney Add [G43 [id number]] Migraine without status migrainosus, not intractable, unspecified migraine type     7/23/2018  1:30 PM Elena PERDUE Add [N39 0] Urinary tract infection       ED Disposition     ED Disposition Condition Comment    Discharge  Lolly Camejo discharge to home/self care  Condition at discharge: Stable        Follow-up Information     Follow up With Specialties Details Why Kia Montilla MD Geriatric Medicine, Family Medicine Schedule an appointment as soon as possible for a visit  [address]            Patient's Medications   Discharge Prescriptions    KEFLEX 500 MG CAPSULE    Take 1 capsule (500 mg total) by mouth 3 (three) times a day for 7 days       Start Date: 7/23/2018 End Date: 7/30/2018       Order Dose: 500 mg       Quantity: 21 capsule    Refills: 0     No discharge procedures on file      ED Provider  Electronically Signed by           Kedar Prescott PA-C  07/23/18 9256

## 2018-07-24 LAB — BACTERIA UR CULT: NORMAL

## 2019-02-22 ENCOUNTER — APPOINTMENT (EMERGENCY)
Dept: RADIOLOGY | Facility: HOSPITAL | Age: 29
End: 2019-02-22
Payer: COMMERCIAL

## 2019-02-22 ENCOUNTER — HOSPITAL ENCOUNTER (EMERGENCY)
Facility: HOSPITAL | Age: 29
Discharge: HOME | End: 2019-02-22
Attending: EMERGENCY MEDICINE | Admitting: EMERGENCY MEDICINE
Payer: COMMERCIAL

## 2019-02-22 VITALS
SYSTOLIC BLOOD PRESSURE: 96 MMHG | RESPIRATION RATE: 18 BRPM | HEART RATE: 77 BPM | DIASTOLIC BLOOD PRESSURE: 53 MMHG | BODY MASS INDEX: 26.24 KG/M2 | TEMPERATURE: 98.7 F | WEIGHT: 125 LBS | HEIGHT: 58 IN | OXYGEN SATURATION: 100 %

## 2019-02-22 DIAGNOSIS — R07.89 ATYPICAL CHEST PAIN: Primary | ICD-10-CM

## 2019-02-22 LAB
ANION GAP SERPL CALCULATED.3IONS-SCNC: 10 MMOL/L (ref 4–13)
BASOPHILS # BLD AUTO: 0.01 THOUSANDS/ΜL (ref 0–0.1)
BASOPHILS NFR BLD AUTO: 0 % (ref 0–1)
BILIRUB UR QL STRIP: NEGATIVE
BUN SERPL-MCNC: 8 MG/DL (ref 5–25)
CALCIUM SERPL-MCNC: 8.7 MG/DL (ref 8.3–10.1)
CHLORIDE SERPL-SCNC: 108 MMOL/L (ref 100–108)
CLARITY UR: NORMAL
CO2 SERPL-SCNC: 24 MMOL/L (ref 21–32)
COLOR UR: YELLOW
CREAT SERPL-MCNC: 0.69 MG/DL (ref 0.6–1.3)
EOSINOPHIL # BLD AUTO: 0.09 THOUSAND/ΜL (ref 0–0.61)
EOSINOPHIL NFR BLD AUTO: 2 % (ref 0–6)
ERYTHROCYTE [DISTWIDTH] IN BLOOD BY AUTOMATED COUNT: 13.3 % (ref 11.6–15.1)
EXT PREG TEST URINE: NORMAL
GFR SERPL CREATININE-BSD FRML MDRD: 120 ML/MIN/1.73SQ M
GLUCOSE SERPL-MCNC: 98 MG/DL (ref 65–140)
GLUCOSE UR STRIP-MCNC: NEGATIVE MG/DL
HCT VFR BLD AUTO: 38.7 % (ref 34.8–46.1)
HGB BLD-MCNC: 12.5 G/DL (ref 11.5–15.4)
HGB UR QL STRIP.AUTO: NEGATIVE
IMM GRANULOCYTES # BLD AUTO: 0.01 THOUSAND/UL (ref 0–0.2)
IMM GRANULOCYTES NFR BLD AUTO: 0 % (ref 0–2)
KETONES UR STRIP-MCNC: NEGATIVE MG/DL
LEUKOCYTE ESTERASE UR QL STRIP: NEGATIVE
LYMPHOCYTES # BLD AUTO: 1.37 THOUSANDS/ΜL (ref 0.6–4.47)
LYMPHOCYTES NFR BLD AUTO: 26 % (ref 14–44)
MCH RBC QN AUTO: 29.6 PG (ref 26.8–34.3)
MCHC RBC AUTO-ENTMCNC: 32.3 G/DL (ref 31.4–37.4)
MCV RBC AUTO: 92 FL (ref 82–98)
MONOCYTES # BLD AUTO: 0.43 THOUSAND/ΜL (ref 0.17–1.22)
MONOCYTES NFR BLD AUTO: 8 % (ref 4–12)
NEUTROPHILS # BLD AUTO: 3.44 THOUSANDS/ΜL (ref 1.85–7.62)
NEUTS SEG NFR BLD AUTO: 64 % (ref 43–75)
NITRITE UR QL STRIP: NEGATIVE
NRBC BLD AUTO-RTO: 0 /100 WBCS
PH UR STRIP.AUTO: 5.5 [PH] (ref 4.5–8)
PLATELET # BLD AUTO: 180 THOUSANDS/UL (ref 149–390)
PMV BLD AUTO: 10.6 FL (ref 8.9–12.7)
POTASSIUM SERPL-SCNC: 4 MMOL/L (ref 3.5–5.3)
PROT UR STRIP-MCNC: NEGATIVE MG/DL
RBC # BLD AUTO: 4.22 MILLION/UL (ref 3.81–5.12)
SODIUM SERPL-SCNC: 142 MMOL/L (ref 136–145)
SP GR UR STRIP.AUTO: >=1.03 (ref 1–1.03)
TROPONIN I SERPL-MCNC: <0.02 NG/ML
UROBILINOGEN UR QL STRIP.AUTO: 0.2 E.U./DL
WBC # BLD AUTO: 5.35 THOUSAND/UL (ref 4.31–10.16)

## 2019-02-22 PROCEDURE — 84484 ASSAY OF TROPONIN QUANT: CPT | Performed by: EMERGENCY MEDICINE

## 2019-02-22 PROCEDURE — 80048 BASIC METABOLIC PNL TOTAL CA: CPT | Performed by: EMERGENCY MEDICINE

## 2019-02-22 PROCEDURE — 85025 COMPLETE CBC W/AUTO DIFF WBC: CPT | Performed by: EMERGENCY MEDICINE

## 2019-02-22 PROCEDURE — 99285 EMERGENCY DEPT VISIT HI MDM: CPT

## 2019-02-22 PROCEDURE — 93005 ELECTROCARDIOGRAM TRACING: CPT

## 2019-02-22 PROCEDURE — 36415 COLL VENOUS BLD VENIPUNCTURE: CPT | Performed by: EMERGENCY MEDICINE

## 2019-02-22 PROCEDURE — 81003 URINALYSIS AUTO W/O SCOPE: CPT

## 2019-02-22 PROCEDURE — 81025 URINE PREGNANCY TEST: CPT | Performed by: EMERGENCY MEDICINE

## 2019-02-22 PROCEDURE — 71046 X-RAY EXAM CHEST 2 VIEWS: CPT

## 2019-02-22 RX ORDER — ACETAMINOPHEN 325 MG/1
650 TABLET ORAL EVERY 6 HOURS PRN
COMMUNITY
End: 2019-09-26

## 2019-02-22 RX ORDER — IBUPROFEN 600 MG/1
600 TABLET ORAL EVERY 6 HOURS PRN
Qty: 20 TABLET | Refills: 0 | Status: SHIPPED | OUTPATIENT
Start: 2019-02-22 | End: 2019-09-26

## 2019-02-22 RX ORDER — KETOROLAC TROMETHAMINE 30 MG/ML
15 INJECTION, SOLUTION INTRAMUSCULAR; INTRAVENOUS ONCE
Status: COMPLETED | OUTPATIENT
Start: 2019-02-22 | End: 2019-02-22

## 2019-02-22 RX ORDER — MEDROXYPROGESTERONE ACETATE 150 MG/ML
150 INJECTION, SUSPENSION INTRAMUSCULAR
COMMUNITY
End: 2019-09-26

## 2019-02-22 RX ADMIN — KETOROLAC TROMETHAMINE 15 MG: 30 INJECTION, SOLUTION INTRAMUSCULAR at 10:07

## 2019-02-22 NOTE — ED PROVIDER NOTES
History  Chief Complaint   Patient presents with    Chest Pain     chest pain for "a couple of weeks"  constant, central   denies recent illness/cough/SOB     A 72-year-old female with no significant past medical history; presents with chest pain  Patient states chest pain began approximately 2-3 weeks ago  Pain is located midsternally and is nonradiating  Patient states chest pain has been constant since onset  Pain is worse with movement of the torso  Patient has not taken anything for symptoms  Patient denies inciting injury  Patient otherwise denies fever, chills, dizziness, lightheadedness, cough, shortness of breath, abdominal pain, nausea, vomiting, diarrhea, peripheral edema and rashes  Patient has never had similar symptoms  A/P:  Chest pain, constant over the past several weeks  Tenderness is reproducible along the sternal border  Suspect muscular etiology to patient's symptoms, however due to prolonged course will check lab work for anemia, renal impairment & electrolyte abnormality  Doubt acute ACS, however will check a troponin for evaluation of pericarditis/myocarditis  Will check EKG for arrhythmia and ischemic changes  Will give Toradol for pain control  History provided by:  Patient  Chest Pain       Prior to Admission Medications   Prescriptions Last Dose Informant Patient Reported? Taking?   acetaminophen (TYLENOL) 325 mg tablet   Yes Yes   Sig: Take 650 mg by mouth every 6 (six) hours as needed for mild pain   medroxyPROGESTERone (DEPO-PROVERA) 150 mg/mL injection   Yes Yes   Sig: Inject 150 mg into a muscle every 3 (three) months      Facility-Administered Medications: None       Past Medical History:   Diagnosis Date    No known health problems        Past Surgical History:   Procedure Laterality Date    NO PAST SURGERIES         History reviewed  No pertinent family history  I have reviewed and agree with the history as documented      Social History     Tobacco Use    Smoking status: Current Every Day Smoker     Packs/day: 0 00    Smokeless tobacco: Never Used   Substance Use Topics    Alcohol use: No     Comment: ocassionl    Drug use: No        Review of Systems   Cardiovascular: Positive for chest pain  All other systems reviewed and are negative  Physical Exam  Physical Exam  General Appearance: alert and oriented, nad, non toxic appearing  Skin:  Warm, dry, intact  HEENT: atraumatic, normocephalic  Neck: Supple, trachea midline  Cardiac: RRR; no murmurs, rub, gallops  Pulmonary: lungs CTAB; no wheezes, rales, rhonchi  Chest wall tender to palpation along lower sternal border    Gastrointestinal: abdomen soft, nontender, nondistended; no guarding or rebound tenderness; good bowel sounds, no mass or bruits  Extremities:  no pedal edema, 2+ pulses; no calf tenderness, no clubbing, no cyanosis  Neuro:  no focal motor or sensory deficits, CN 2-12 grossly intact  Psych:  Normal mood and affect, normal judgement and insight      Vital Signs  ED Triage Vitals [02/22/19 0851]   Temperature Pulse Respirations Blood Pressure SpO2   98 7 °F (37 1 °C) 77 18 110/69 98 %      Temp Source Heart Rate Source Patient Position - Orthostatic VS BP Location FiO2 (%)   Oral Monitor Lying Left arm --      Pain Score       7           Vitals:    02/22/19 0851 02/22/19 1050   BP: 110/69 96/53   Pulse: 77 77   Patient Position - Orthostatic VS: Lying Sitting       Visual Acuity      ED Medications  Medications   ketorolac (TORADOL) injection 15 mg (15 mg Intravenous Given 2/22/19 1007)       Diagnostic Studies  Results Reviewed     Procedure Component Value Units Date/Time    POCT urinalysis dipstick [[id number]]  (Abnormal) Resulted:  02/22/19 1049    Lab Status:  Final result Specimen:  Urine, Other Updated:  02/22/19 1049    POCT pregnancy, urine [[id number]]  (Normal) Resulted:  02/22/19 1049    Lab Status:  Final result Updated:  02/22/19 1049     EXT PREG TEST UR (Ref: Negative) HCG = neg (-)    Troponin I [[id number]]  (Normal) Collected:  02/22/19 0916    Lab Status:  Final result Specimen:  Blood from Arm, Right Updated:  02/22/19 0947     Troponin I <0 02 ng/mL     Basic metabolic panel [[id number]] Collected:  02/22/19 0916    Lab Status:  Final result Specimen:  Blood from Arm, Right Updated:  02/22/19 2093     Sodium 142 mmol/L      Potassium 4 0 mmol/L      Chloride 108 mmol/L      CO2 24 mmol/L      ANION GAP 10 mmol/L      BUN 8 mg/dL      Creatinine 0 69 mg/dL      Glucose 98 mg/dL      Calcium 8 7 mg/dL      eGFR 120 ml/min/1 73sq m     Narrative:       National Kidney Disease Education Program recommendations are as follows:  GFR calculation is accurate only with a steady state creatinine  Chronic Kidney disease less than 60 ml/min/1 73 sq  meters  Kidney failure less than 15 ml/min/1 73 sq  meters      CBC and differential [[id number]] Collected:  02/22/19 0916    Lab Status:  Final result Specimen:  Blood from Arm, Right Updated:  02/22/19 0923     WBC 5 35 Thousand/uL      RBC 4 22 Million/uL      Hemoglobin 12 5 g/dL      Hematocrit 38 7 %      MCV 92 fL      MCH 29 6 pg      MCHC 32 3 g/dL      RDW 13 3 %      MPV 10 6 fL      Platelets 470 Thousands/uL      nRBC 0 /100 WBCs      Neutrophils Relative 64 %      Immat GRANS % 0 %      Lymphocytes Relative 26 %      Monocytes Relative 8 %      Eosinophils Relative 2 %      Basophils Relative 0 %      Neutrophils Absolute 3 44 Thousands/µL      Immature Grans Absolute 0 01 Thousand/uL      Lymphocytes Absolute 1 37 Thousands/µL      Monocytes Absolute 0 43 Thousand/µL      Eosinophils Absolute 0 09 Thousand/µL      Basophils Absolute 0 01 Thousands/µL     ED Urine Macroscopic [[id number]] Collected:  02/22/19 0900    Lab Status:  Final result Specimen:  Urine Updated:  02/22/19 0900     Color, UA Yellow     Clarity, UA Cloudy     pH, UA 5 5     Leukocytes, UA Negative     Nitrite, UA Negative     Protein, UA Negative mg/dl      Glucose, UA Negative mg/dl      Ketones, UA Negative mg/dl      Urobilinogen, UA 0 2 E U /dl      Bilirubin, UA Negative     Blood, UA Negative     Specific Gravity, UA >=1 030    Narrative:       CLINITEK RESULT                 XR chest 2 views   ED Interpretation by Sharleen Sandifer, DO (02/22 3425)   No acute disease      Final Result by Sarah Marc MD (02/22 1689)      No acute cardiopulmonary disease  Workstation performed: [id number]                    Procedures  Procedures   ECG 12 Lead Documentation  Date/Time: today/date: 2/23/2019  Performed by: Francisca Mera    ECG reviewed by me, the ED Provider: yes    Patient location:  ED   Previous ECG:  No old for comparison    Rate:  74  ECG rate assessment: normal    Rhythm: sinus rhythm    Ectopy:  none    QRS axis:  Normal  Intervals: normal   Q waves: None   ST segments:  Normal  T waves: normal      Impression: Normal EKG        Phone Contacts  ED Phone Contact    ED Course  ED Course as of Feb 23 1001   Fri Feb 22, 2019   1010 Labs within normal limits  1030 Negative per RN   POCT pregnancy, urine   1039 Pt resting comfortably  Pt reports Toradol did improve her symptoms, currently only complaining of pain with movement of the torso  Suspect symptoms are likely secondary to chest wall pain  Will proceed with discharge home              HEART Risk Score      Most Recent Value   History  0 Filed at: 02/22/2019 0950   ECG  0 Filed at: 02/22/2019 0950   Age  0 Filed at: 02/22/2019 0950   Risk Factors  1 Filed at: 02/22/2019 0950   Troponin  0 Filed at: 02/22/2019 0950   Heart Score Risk Calculator   History  0 Filed at: 02/22/2019 0950   ECG  0 Filed at: 02/22/2019 0950   Age  0 Filed at: 02/22/2019 0950   Risk Factors  1 Filed at: 02/22/2019 0950   Troponin  0 Filed at: 02/22/2019 0950   HEART Score  1 Filed at: 02/22/2019 0950   HEART Score  1 Filed at: 02/22/2019 1888                            MDM    Disposition  Final diagnoses:   Atypical chest pain     Time reflects when diagnosis was documented in both MDM as applicable and the Disposition within this note     Time User Action Codes Description Comment    2/22/2019 10:41 AM [place] Add [R07 89] Atypical chest pain       ED Disposition     ED Disposition Condition Date/Time Comment    Discharge Stable Fri Feb 22, 2019 10:41 AM [address] discharge to home/self care  Follow-up Information     Follow up With Specialties Details Why Contact Info Additional Enedina [facility] [place] [facility] Family [facility] Schedule an appointment as soon as possible for a visit  To establish care with a family doctor [address] [place] [address] [address] [address]  [address] [facility] Emergency Department Emergency Medicine Go to  If symptoms worsen [facility] [address]  [place] [address] ED, [address] [place], [place], [address]          Discharge Medication List as of 2/22/2019 10:42 AM      START taking these medications    Details   ibuprofen (MOTRIN) 600 mg tablet Take 1 tablet (600 mg total) by mouth every 6 (six) hours as needed for mild pain, Starting Fri 2/22/2019, Print         CONTINUE these medications which have NOT CHANGED    Details   acetaminophen (TYLENOL) 325 mg tablet Take 650 mg by mouth every 6 (six) hours as needed for mild pain, Historical Med      medroxyPROGESTERone (DEPO-PROVERA) 150 mg/mL injection Inject 150 mg into a muscle every 3 (three) months, Historical Med           No discharge procedures on file      ED Provider  Electronically Signed by           Darcy Headley DO  02/23/19 1003

## 2019-02-24 LAB
ATRIAL RATE: 74 BPM
P AXIS: 58 DEGREES
PR INTERVAL: 120 MS
QRS AXIS: 72 DEGREES
QRSD INTERVAL: 74 MS
QT INTERVAL: 362 MS
QTC INTERVAL: 401 MS
T WAVE AXIS: 37 DEGREES
VENTRICULAR RATE: 74 BPM

## 2019-02-24 PROCEDURE — 93010 ELECTROCARDIOGRAM REPORT: CPT | Performed by: INTERNAL MEDICINE

## 2019-03-12 ENCOUNTER — HOSPITAL ENCOUNTER (EMERGENCY)
Facility: HOSPITAL | Age: 29
Discharge: HOME | End: 2019-03-12
Attending: EMERGENCY MEDICINE
Payer: COMMERCIAL

## 2019-03-12 VITALS
BODY MASS INDEX: 25.94 KG/M2 | HEART RATE: 103 BPM | WEIGHT: 124.12 LBS | TEMPERATURE: 98.6 F | DIASTOLIC BLOOD PRESSURE: 69 MMHG | SYSTOLIC BLOOD PRESSURE: 127 MMHG | OXYGEN SATURATION: 99 % | RESPIRATION RATE: 16 BRPM

## 2019-03-12 DIAGNOSIS — J06.9 URI (UPPER RESPIRATORY INFECTION): Primary | ICD-10-CM

## 2019-03-12 LAB
FLUAV + FLUBV RNA ISLT NAA+PROBE: NOT DETECTED
FLUAV + FLUBV RNA ISLT NAA+PROBE: NOT DETECTED
S PYO AG THROAT QL: NEGATIVE

## 2019-03-12 PROCEDURE — 87070 CULTURE OTHR SPECIMN AEROBIC: CPT | Performed by: PHYSICIAN ASSISTANT

## 2019-03-12 PROCEDURE — 87502 INFLUENZA DNA AMP PROBE: CPT | Performed by: PHYSICIAN ASSISTANT

## 2019-03-12 PROCEDURE — 99283 EMERGENCY DEPT VISIT LOW MDM: CPT

## 2019-03-12 PROCEDURE — 87430 STREP A AG IA: CPT | Performed by: PHYSICIAN ASSISTANT

## 2019-03-12 RX ORDER — GUAIFENESIN/DEXTROMETHORPHAN 100-10MG/5
5 SYRUP ORAL 3 TIMES DAILY PRN
Qty: 118 ML | Refills: 0 | Status: SHIPPED | OUTPATIENT
Start: 2019-03-12 | End: 2019-03-22

## 2019-03-12 RX ORDER — NAPROXEN 500 MG/1
500 TABLET ORAL 2 TIMES DAILY WITH MEALS
Qty: 20 TABLET | Refills: 0 | Status: SHIPPED | OUTPATIENT
Start: 2019-03-12 | End: 2019-09-26

## 2019-03-12 RX ORDER — FLUTICASONE PROPIONATE 50 MCG
1 SPRAY, SUSPENSION (ML) NASAL DAILY
Qty: 16 G | Refills: 0 | Status: SHIPPED | OUTPATIENT
Start: 2019-03-12 | End: 2019-09-26

## 2019-03-13 NOTE — ED PROVIDER NOTES
History  Chief Complaint   Patient presents with    Sore Throat     Pt c/o sore throat, cough, and body aches beginning yesterday  Patient is a 43-year-old female who presents today with a chief complaint of sore throat, nonproductive cough, body aches, nasal congestion which began yesterday in the evening  Patient reports no fevers or chills at home, no complaints of chest pain, shortness of breath, abdominal pain, nausea, vomiting, diarrhea  Patient denies urinary symptoms  Flu Symptoms   Presenting symptoms: cough, fatigue, myalgias, rhinorrhea and sore throat    Presenting symptoms: no diarrhea, no fever, no headaches, no nausea, no shortness of breath and no vomiting    Severity:  Mild  Onset quality:  Gradual  Duration:  1 day  Progression:  Unchanged  Chronicity:  New  Relieved by:  None tried  Worsened by:  Nothing  Ineffective treatments:  None tried  Associated symptoms: decreased appetite ( patient reports she is drinking well however has pain with solid food intake) and nasal congestion    Associated symptoms: no chills, no ear pain, no mental status change and no neck stiffness             Prior to Admission Medications   Prescriptions Last Dose Informant Patient Reported? Taking?   acetaminophen (TYLENOL) 325 mg tablet   Yes No   Sig: Take 650 mg by mouth every 6 (six) hours as needed for mild pain   ibuprofen (MOTRIN) 600 mg tablet   No No   Sig: Take 1 tablet (600 mg total) by mouth every 6 (six) hours as needed for mild pain   medroxyPROGESTERone (DEPO-PROVERA) 150 mg/mL injection   Yes No   Sig: Inject 150 mg into a muscle every 3 (three) months      Facility-Administered Medications: None       Past Medical History:   Diagnosis Date    No known health problems        Past Surgical History:   Procedure Laterality Date    NO PAST SURGERIES         History reviewed  No pertinent family history  I have reviewed and agree with the history as documented      Social History     Tobacco Use  Smoking status: Current Every Day Smoker     Packs/day: 0 00    Smokeless tobacco: Never Used   Substance Use Topics    Alcohol use: No     Comment: ocassionl    Drug use: No        Review of Systems   Constitutional: Positive for decreased appetite ( patient reports she is drinking well however has pain with solid food intake) and fatigue  Negative for chills and fever  HENT: Positive for congestion, postnasal drip, rhinorrhea and sore throat  Negative for ear pain  Eyes: Negative for redness  Respiratory: Positive for cough  Negative for apnea, chest tightness, shortness of breath, wheezing and stridor  Cardiovascular: Negative for chest pain and palpitations  Gastrointestinal: Negative for abdominal pain, diarrhea, nausea and vomiting  Genitourinary: Negative for dysuria and hematuria  Musculoskeletal: Positive for myalgias  Negative for neck stiffness  Skin: Negative for rash  Neurological: Negative for dizziness, syncope, light-headedness, numbness and headaches         Physical Exam  Physical Exam    Vital Signs  ED Triage Vitals [03/12/19 2027]   Temperature Pulse Respirations Blood Pressure SpO2   98 6 °F (37 °C) 103 16 127/69 99 %      Temp Source Heart Rate Source Patient Position - Orthostatic VS BP Location FiO2 (%)   Tympanic Monitor Sitting Left arm --      Pain Score       7           Vitals:    03/12/19 2027   BP: 127/69   Pulse: 103   Patient Position - Orthostatic VS: Sitting       qSOFA     Row Name 03/12/19 2109 03/12/19 2027             Altered mental status GCS < 15  0  --       Respiratory Rate > / =22  --  0       Systolic BP < / =403  --  0       Q Sofa Score  0  0             Visual Acuity      ED Medications  Medications - No data to display    Diagnostic Studies  Results Reviewed     Procedure Component Value Units Date/Time    Rapid Flu-Viral RNA amplification [facility] HEART ONLY) [[id number]]  (Normal) Collected:  03/12/19 2043    Lab Status:  Final result Specimen:  Nares from Nose Updated:  03/12/19 2112     INFLUENZA A AMPLIFIED RNA Not Detected     INFLUENZA B AMPLIFIED Not Detected    Rapid Strep A Screen With Reflex to Culture, Pediatrics and Compromised Adults [[id number]]  (Normal) Collected:  03/12/19 2043    Lab Status:  Final result Specimen:  Throat Updated:  03/12/19 2102     Rapid Strep A Screen Negative    Throat culture [[id number]] Collected:  03/12/19 2043    Lab Status: In process Specimen:  Throat Updated:  03/12/19 2102                 No orders to display              Procedures  Procedures       Phone Contacts  ED Phone Contact    ED Course                               MDM    Disposition  Final diagnoses:   URI (upper respiratory infection)     Time reflects when diagnosis was documented in both MDM as applicable and the Disposition within this note     Time User Action Codes Description Comment    3/12/2019  9:34 PM Hyacinth Walter Add [J06 9] URI (upper respiratory infection)       ED Disposition     ED Disposition Condition Date/Time Comment    Discharge Good Tue Mar 12, 2019  9:11 PM [address] discharge to home/self care              Follow-up Information     Follow up With Specialties Details Why Contact Info    Karen Chery MD Family Medicine Schedule an appointment as soon as possible for a visit in 2 days  [address] [place] [address]  [phone]            Discharge Medication List as of 3/12/2019  9:35 PM      START taking these medications    Details   dextromethorphan-guaiFENesin (ROBITUSSIN DM)  mg/5 mL syrup Take 5 mL by mouth 3 (three) times a day as needed for cough for up to 10 days, Starting Tue 3/12/2019, Until Fri 3/22/2019, Print      fluticasone (FLONASE) 50 mcg/act nasal spray 1 spray into each nostril daily, Starting Tue 3/12/2019, Print      naproxen (EC NAPROSYN) 500 MG EC tablet Take 1 tablet (500 mg total) by mouth 2 (two) times a day with meals, Starting Tue 3/12/2019, Until Wed 3/11/2020, Print         CONTINUE these medications which have NOT CHANGED    Details   acetaminophen (TYLENOL) 325 mg tablet Take 650 mg by mouth every 6 (six) hours as needed for mild pain, Historical Med      ibuprofen (MOTRIN) 600 mg tablet Take 1 tablet (600 mg total) by mouth every 6 (six) hours as needed for mild pain, Starting Fri 2/22/2019, Print      medroxyPROGESTERone (DEPO-PROVERA) 150 mg/mL injection Inject 150 mg into a muscle every 3 (three) months, Historical Med           No discharge procedures on file      ED Provider  Electronically Signed by           Rosa Brito PA-C  03/13/19 7037

## 2019-03-15 LAB — BACTERIA THROAT CULT: NORMAL

## 2019-03-19 ENCOUNTER — HOSPITAL ENCOUNTER (EMERGENCY)
Facility: HOSPITAL | Age: 29
Discharge: HOME | End: 2019-03-20
Attending: EMERGENCY MEDICINE | Admitting: EMERGENCY MEDICINE
Payer: COMMERCIAL

## 2019-03-19 VITALS
TEMPERATURE: 98.1 F | RESPIRATION RATE: 18 BRPM | DIASTOLIC BLOOD PRESSURE: 73 MMHG | HEART RATE: 78 BPM | OXYGEN SATURATION: 99 % | SYSTOLIC BLOOD PRESSURE: 117 MMHG | WEIGHT: 119.49 LBS | BODY MASS INDEX: 24.97 KG/M2

## 2019-03-19 DIAGNOSIS — R51.9 HEADACHE: Primary | ICD-10-CM

## 2019-03-19 LAB — EXT PREG TEST URINE: NEGATIVE

## 2019-03-19 PROCEDURE — 81025 URINE PREGNANCY TEST: CPT | Performed by: EMERGENCY MEDICINE

## 2019-03-19 PROCEDURE — 99283 EMERGENCY DEPT VISIT LOW MDM: CPT

## 2019-03-19 RX ORDER — IBUPROFEN 600 MG/1
600 TABLET ORAL ONCE
Status: COMPLETED | OUTPATIENT
Start: 2019-03-19 | End: 2019-03-19

## 2019-03-19 RX ORDER — BUTALBITAL, ACETAMINOPHEN AND CAFFEINE 50; 325; 40 MG/1; MG/1; MG/1
2 TABLET ORAL ONCE
Status: COMPLETED | OUTPATIENT
Start: 2019-03-19 | End: 2019-03-19

## 2019-03-19 RX ADMIN — IBUPROFEN 600 MG: 600 TABLET ORAL at 23:43

## 2019-03-19 RX ADMIN — BUTALBITAL, ACETAMINOPHEN, AND CAFFEINE 2 TABLET: 50; 325; 40 TABLET ORAL at 23:42

## 2019-03-20 RX ORDER — BUTALBITAL, ACETAMINOPHEN AND CAFFEINE 50; 325; 40 MG/1; MG/1; MG/1
2 TABLET ORAL EVERY 4 HOURS PRN
Qty: 20 TABLET | Refills: 0 | Status: SHIPPED | OUTPATIENT
Start: 2019-03-20 | End: 2019-09-26

## 2019-03-20 NOTE — ED NOTES
Received pt from waiting room  Pt ambulates without difficulty  Accompanied by significant other  Pt stable        Yanely Oliveira RN  03/19/19 8101

## 2019-03-21 NOTE — ED PROVIDER NOTES
History  Chief Complaint   Patient presents with    Headache     pt states that she started 2 days ago with a HA  pt states that it is in the front of her head  pt states that she has been feeling nauseated  pt states that she took tylenol this morning        History provided by:  Patient  Headache   Pain location:  Frontal  Quality:  Dull  Radiates to:  Does not radiate  Onset quality:  Gradual  Timing:  Constant  Progression:  Unchanged  Context: bright light and loud noise    Relieved by:  Nothing  Worsened by:  Nothing  Ineffective treatments:  None tried  Associated symptoms: no abdominal pain, no cough, no diarrhea, no dizziness, no eye pain, no fever, no myalgias, no nausea, no neck stiffness, no numbness, no sore throat and no weakness        Prior to Admission Medications   Prescriptions Last Dose Informant Patient Reported? Taking?   acetaminophen (TYLENOL) 325 mg tablet   Yes No   Sig: Take 650 mg by mouth every 6 (six) hours as needed for mild pain   dextromethorphan-guaiFENesin (ROBITUSSIN DM)  mg/5 mL syrup   No No   Sig: Take 5 mL by mouth 3 (three) times a day as needed for cough for up to 10 days   fluticasone (FLONASE) 50 mcg/act nasal spray   No No   Si spray into each nostril daily   ibuprofen (MOTRIN) 600 mg tablet   No No   Sig: Take 1 tablet (600 mg total) by mouth every 6 (six) hours as needed for mild pain   medroxyPROGESTERone (DEPO-PROVERA) 150 mg/mL injection   Yes No   Sig: Inject 150 mg into a muscle every 3 (three) months   naproxen (EC NAPROSYN) 500 MG EC tablet   No No   Sig: Take 1 tablet (500 mg total) by mouth 2 (two) times a day with meals      Facility-Administered Medications: None       Past Medical History:   Diagnosis Date    No known health problems        Past Surgical History:   Procedure Laterality Date    NO PAST SURGERIES         History reviewed  No pertinent family history  I have reviewed and agree with the history as documented      Social History Tobacco Use    Smoking status: Never Smoker    Smokeless tobacco: Never Used   Substance Use Topics    Alcohol use: No     Comment: ocassionl    Drug use: No        Review of Systems   Constitutional: Negative for chills and fever  HENT: Negative for rhinorrhea, sore throat and trouble swallowing  Eyes: Negative for pain  Respiratory: Negative for cough, shortness of breath, wheezing and stridor  Cardiovascular: Negative for chest pain and leg swelling  Gastrointestinal: Negative for abdominal pain, diarrhea and nausea  Endocrine: Negative for polyuria  Genitourinary: Negative for dysuria, flank pain and urgency  Musculoskeletal: Negative for joint swelling, myalgias and neck stiffness  Skin: Negative for rash  Allergic/Immunologic: Negative for immunocompromised state  Neurological: Positive for headaches  Negative for dizziness, syncope, weakness and numbness  Psychiatric/Behavioral: Negative for confusion and suicidal ideas  All other systems reviewed and are negative  Physical Exam  Physical Exam   Constitutional: She is oriented to person, place, and time  She appears well-developed and well-nourished  HENT:   Head: Normocephalic and atraumatic  Eyes: Pupils are equal, round, and reactive to light  EOM are normal    Neck: Normal range of motion  Neck supple  Cardiovascular: Normal rate and regular rhythm  Exam reveals no friction rub  No murmur heard  Pulmonary/Chest: Breath sounds normal  No respiratory distress  She has no wheezes  She has no rales  Abdominal: Soft  Bowel sounds are normal  She exhibits no distension  There is no tenderness  Musculoskeletal: Normal range of motion  She exhibits no edema or tenderness  Neurological: She is alert and oriented to person, place, and time  GCS 15  AAOx4  No focal neuro deficits  CN II-XII intact  PERRL  EOMI  [place] No pronator drift   strength 5/5 bilaterally  B/L UE strength 5/5 throughout   Finger to nose normal  Cerebellar function normal  Ambulates without difficulty  B/L LE strength 5/5 throughout  Gross sensation to b/l upper and lower extremities intact  Skin: Skin is warm  No rash noted  Psychiatric: She has a normal mood and affect  Nursing note and vitals reviewed  Vital Signs  ED Triage Vitals [03/19/19 2339]   Temperature Pulse Respirations Blood Pressure SpO2   98 1 °F (36 7 °C) 78 18 117/73 99 %      Temp Source Heart Rate Source Patient Position - Orthostatic VS BP Location FiO2 (%)   Tympanic Monitor Sitting Left arm --      Pain Score       --           Vitals:    03/19/19 2339   BP: 117/73   Pulse: 78   Patient Position - Orthostatic VS: Sitting         Visual Acuity  Visual Acuity      Most Recent Value   L Pupil Size (mm)  3   R Pupil Size (mm)  3          ED Medications  Medications   butalbital-acetaminophen-caffeine (FIORICET,ESGIC) -40 mg per tablet 2 tablet (2 tablets Oral Given 3/19/19 2342)   ibuprofen (MOTRIN) tablet 600 mg (600 mg Oral Given 3/19/19 2343)       Diagnostic Studies  Results Reviewed     Procedure Component Value Units Date/Time    POCT pregnancy, urine [[id number]]  (Normal) Resulted:  03/19/19 2342    Lab Status:  Final result Updated:  03/19/19 2342     EXT PREG TEST UR (Ref: Negative) negative                 No orders to display              Procedures  Procedures       Phone Contacts  ED Phone Contact    ED Course                               MDM  Number of Diagnoses or Management Options  Headache: new and requires workup  Diagnosis management comments: 64-year-old female with a prior history of migraines presents emergency department with worsening headache over last several days duration of worsening migraine  This patient states this normal of her normal migraines she took over-the-counter medication with no improvement improvement of symptoms patient is not on any migraine medication at this point time    Patient has no concerning neurological fact no neurological deficits  Her headache is located in the front area differential diagnosis includes headache, migraine, tension headache, sinus headache, less likely intracranial hemorrhage or other bleeding no indication at this point time clinically warranted for CT of the head medications including 1st set given with improvement of symptoms in the emergency department plan outpatient management followup given strict instructions to follow up with her neurologist as an outpatient  Pt re-examined and evaluated after testing and treatment  Spoke with the patient and feeling improved and sxs have resolved  Will discharge home with close f/u with pcp and instructed to return to the ED if sxs worsen or continue  Pt agrees with the plan for discharge and feels comfortable to go home with proper f/u  Advised to return for worsening or additional problems  Diagnostic tests were reviewed and questions answered  Diagnosis, care plan and treatment options were discussed  The patient understand instructions and will follow up as directed  Disposition  Final diagnoses:   Headache     Time reflects when diagnosis was documented in both MDM as applicable and the Disposition within this note     Time User Action Codes Description Comment    3/20/2019 12:02 AM Lalo Miramontes Add [R51] Headache       ED Disposition     ED Disposition Condition Date/Time Comment    Discharge Stable Wed Mar 20, 2019 12:02 AM [address] discharge to home/self care              Follow-up Information     Follow up With Specialties Details Why Contact Info    Gerry Fitzgreald MD [address] [place]  [address]             Discharge Medication List as of 3/20/2019 12:03 AM      START taking these medications    Details   butalbital-acetaminophen-caffeine (FIORICET,ESGIC) -40 mg per tablet Take 2 tablets by mouth every 4 (four) hours as needed for headaches, Starting Wed 3/20/2019, Print         CONTINUE these medications which have NOT CHANGED    Details   acetaminophen (TYLENOL) 325 mg tablet Take 650 mg by mouth every 6 (six) hours as needed for mild pain, Historical Med      dextromethorphan-guaiFENesin (ROBITUSSIN DM)  mg/5 mL syrup Take 5 mL by mouth 3 (three) times a day as needed for cough for up to 10 days, Starting Tue 3/12/2019, Until Fri 3/22/2019, Print      fluticasone (FLONASE) 50 mcg/act nasal spray 1 spray into each nostril daily, Starting Tue 3/12/2019, Print      ibuprofen (MOTRIN) 600 mg tablet Take 1 tablet (600 mg total) by mouth every 6 (six) hours as needed for mild pain, Starting Fri 2/22/2019, Print      medroxyPROGESTERone (DEPO-PROVERA) 150 mg/mL injection Inject 150 mg into a muscle every 3 (three) months, Historical Med      naproxen (EC NAPROSYN) 500 MG EC tablet Take 1 tablet (500 mg total) by mouth 2 (two) times a day with meals, Starting Tue 3/12/2019, Until Wed 3/11/2020, Print           No discharge procedures on file      ED Provider  Electronically Signed by           Elon Baumgarten, DO  03/21/19 8708

## 2019-05-12 ENCOUNTER — HOSPITAL ENCOUNTER (EMERGENCY)
Facility: HOSPITAL | Age: 29
Discharge: HOME | End: 2019-05-12
Attending: EMERGENCY MEDICINE
Payer: COMMERCIAL

## 2019-05-12 VITALS
HEART RATE: 75 BPM | OXYGEN SATURATION: 99 % | TEMPERATURE: 99 F | BODY MASS INDEX: 25.54 KG/M2 | RESPIRATION RATE: 16 BRPM | DIASTOLIC BLOOD PRESSURE: 56 MMHG | SYSTOLIC BLOOD PRESSURE: 95 MMHG | WEIGHT: 122.2 LBS

## 2019-05-12 DIAGNOSIS — R22.0 SWELLING OF UPPER LIP: Primary | ICD-10-CM

## 2019-05-12 DIAGNOSIS — S00.501A SUPERFICIAL INJURY OF LIP WITH INFECTION, INITIAL ENCOUNTER: ICD-10-CM

## 2019-05-12 DIAGNOSIS — L08.9 SUPERFICIAL INJURY OF LIP WITH INFECTION, INITIAL ENCOUNTER: ICD-10-CM

## 2019-05-12 PROCEDURE — 99283 EMERGENCY DEPT VISIT LOW MDM: CPT

## 2019-05-12 PROCEDURE — 99283 EMERGENCY DEPT VISIT LOW MDM: CPT | Performed by: PHYSICIAN ASSISTANT

## 2019-05-12 RX ORDER — PENICILLIN V POTASSIUM 500 MG/1
500 TABLET ORAL 4 TIMES DAILY
Qty: 40 TABLET | Refills: 0 | Status: SHIPPED | OUTPATIENT
Start: 2019-05-12 | End: 2019-05-22

## 2019-09-10 ENCOUNTER — HOSPITAL ENCOUNTER (EMERGENCY)
Facility: HOSPITAL | Age: 29
Discharge: HOME | End: 2019-09-10
Attending: EMERGENCY MEDICINE | Admitting: EMERGENCY MEDICINE
Payer: COMMERCIAL

## 2019-09-10 VITALS
OXYGEN SATURATION: 100 % | RESPIRATION RATE: 20 BRPM | BODY MASS INDEX: 24.24 KG/M2 | HEART RATE: 80 BPM | TEMPERATURE: 97.6 F | WEIGHT: 116 LBS | SYSTOLIC BLOOD PRESSURE: 105 MMHG | DIASTOLIC BLOOD PRESSURE: 69 MMHG

## 2019-09-10 DIAGNOSIS — R10.9 ABDOMINAL PAIN: Primary | ICD-10-CM

## 2019-09-10 DIAGNOSIS — K59.00 CONSTIPATION: ICD-10-CM

## 2019-09-10 DIAGNOSIS — N89.8 VAGINAL DISCHARGE: ICD-10-CM

## 2019-09-10 LAB
ALBUMIN SERPL BCP-MCNC: 5 G/DL (ref 3–5.2)
ALP SERPL-CCNC: 67 U/L (ref 43–122)
ALT SERPL W P-5'-P-CCNC: 27 U/L (ref 9–52)
ANION GAP SERPL CALCULATED.3IONS-SCNC: 9 MMOL/L (ref 5–14)
AST SERPL W P-5'-P-CCNC: 30 U/L (ref 14–36)
BACTERIA UR QL AUTO: ABNORMAL /HPF
BASOPHILS # BLD AUTO: 0 THOUSANDS/ΜL (ref 0–0.1)
BASOPHILS NFR BLD AUTO: 0 % (ref 0–1)
BILIRUB SERPL-MCNC: 0.5 MG/DL
BILIRUB UR QL STRIP: NEGATIVE
BUN SERPL-MCNC: 13 MG/DL (ref 5–25)
CALCIUM SERPL-MCNC: 9.8 MG/DL (ref 8.4–10.2)
CHLORIDE SERPL-SCNC: 102 MMOL/L (ref 97–108)
CLARITY UR: ABNORMAL
CO2 SERPL-SCNC: 28 MMOL/L (ref 22–30)
COLOR UR: YELLOW
CREAT SERPL-MCNC: 0.61 MG/DL (ref 0.6–1.2)
EOSINOPHIL # BLD AUTO: 0.1 THOUSAND/ΜL (ref 0–0.4)
EOSINOPHIL NFR BLD AUTO: 1 % (ref 0–6)
ERYTHROCYTE [DISTWIDTH] IN BLOOD BY AUTOMATED COUNT: 13.5 %
EXT PREG TEST URINE: NEGATIVE
EXT. CONTROL ED NAV: NORMAL
GFR SERPL CREATININE-BSD FRML MDRD: 124 ML/MIN/1.73SQ M
GLUCOSE SERPL-MCNC: 88 MG/DL (ref 70–99)
GLUCOSE UR STRIP-MCNC: NEGATIVE MG/DL
HCT VFR BLD AUTO: 42.5 % (ref 36–46)
HGB BLD-MCNC: 14.7 G/DL (ref 12–16)
HGB UR QL STRIP.AUTO: 50
KETONES UR STRIP-MCNC: ABNORMAL MG/DL
LEUKOCYTE ESTERASE UR QL STRIP: NEGATIVE
LYMPHOCYTES # BLD AUTO: 1.9 THOUSANDS/ΜL (ref 0.5–4)
LYMPHOCYTES NFR BLD AUTO: 25 % (ref 25–45)
MCH RBC QN AUTO: 30.5 PG (ref 26–34)
MCHC RBC AUTO-ENTMCNC: 34.7 G/DL (ref 31–36)
MCV RBC AUTO: 88 FL (ref 80–100)
MONOCYTES # BLD AUTO: 0.6 THOUSAND/ΜL (ref 0.2–0.9)
MONOCYTES NFR BLD AUTO: 8 % (ref 1–10)
MUCOUS THREADS UR QL AUTO: ABNORMAL
NEUTROPHILS # BLD AUTO: 5.1 THOUSANDS/ΜL (ref 1.8–7.8)
NEUTS SEG NFR BLD AUTO: 66 % (ref 45–65)
NITRITE UR QL STRIP: NEGATIVE
NON-SQ EPI CELLS URNS QL MICRO: ABNORMAL /HPF
PH UR STRIP.AUTO: 5 [PH]
PLATELET # BLD AUTO: 178 THOUSANDS/UL (ref 150–450)
PMV BLD AUTO: 9.8 FL (ref 8.9–12.7)
POTASSIUM SERPL-SCNC: 3.7 MMOL/L (ref 3.6–5)
PROT SERPL-MCNC: 8.1 G/DL (ref 5.9–8.4)
PROT UR STRIP-MCNC: NEGATIVE MG/DL
RBC # BLD AUTO: 4.83 MILLION/UL (ref 4–5.2)
RBC #/AREA URNS AUTO: ABNORMAL /HPF
SODIUM SERPL-SCNC: 139 MMOL/L (ref 137–147)
SP GR UR STRIP.AUTO: 1.02 (ref 1–1.04)
UROBILINOGEN UA: NEGATIVE MG/DL
WBC # BLD AUTO: 7.7 THOUSAND/UL (ref 4.5–11)
WBC #/AREA URNS AUTO: ABNORMAL /HPF

## 2019-09-10 PROCEDURE — 87480 CANDIDA DNA DIR PROBE: CPT | Performed by: EMERGENCY MEDICINE

## 2019-09-10 PROCEDURE — 96374 THER/PROPH/DIAG INJ IV PUSH: CPT

## 2019-09-10 PROCEDURE — 87660 TRICHOMONAS VAGIN DIR PROBE: CPT | Performed by: EMERGENCY MEDICINE

## 2019-09-10 PROCEDURE — 81025 URINE PREGNANCY TEST: CPT | Performed by: EMERGENCY MEDICINE

## 2019-09-10 PROCEDURE — 36415 COLL VENOUS BLD VENIPUNCTURE: CPT | Performed by: EMERGENCY MEDICINE

## 2019-09-10 PROCEDURE — 85025 COMPLETE CBC W/AUTO DIFF WBC: CPT | Performed by: EMERGENCY MEDICINE

## 2019-09-10 PROCEDURE — 99284 EMERGENCY DEPT VISIT MOD MDM: CPT

## 2019-09-10 PROCEDURE — 99284 EMERGENCY DEPT VISIT MOD MDM: CPT | Performed by: EMERGENCY MEDICINE

## 2019-09-10 PROCEDURE — 81003 URINALYSIS AUTO W/O SCOPE: CPT | Performed by: EMERGENCY MEDICINE

## 2019-09-10 PROCEDURE — 87510 GARDNER VAG DNA DIR PROBE: CPT | Performed by: EMERGENCY MEDICINE

## 2019-09-10 PROCEDURE — 87591 N.GONORRHOEAE DNA AMP PROB: CPT | Performed by: EMERGENCY MEDICINE

## 2019-09-10 PROCEDURE — 81001 URINALYSIS AUTO W/SCOPE: CPT | Performed by: EMERGENCY MEDICINE

## 2019-09-10 PROCEDURE — 87491 CHLMYD TRACH DNA AMP PROBE: CPT | Performed by: EMERGENCY MEDICINE

## 2019-09-10 PROCEDURE — 80053 COMPREHEN METABOLIC PANEL: CPT | Performed by: EMERGENCY MEDICINE

## 2019-09-10 RX ORDER — KETOROLAC TROMETHAMINE 30 MG/ML
15 INJECTION, SOLUTION INTRAMUSCULAR; INTRAVENOUS ONCE
Status: COMPLETED | OUTPATIENT
Start: 2019-09-10 | End: 2019-09-10

## 2019-09-10 RX ORDER — DICYCLOMINE HCL 20 MG
20 TABLET ORAL ONCE
Status: COMPLETED | OUTPATIENT
Start: 2019-09-10 | End: 2019-09-10

## 2019-09-10 RX ORDER — POLYETHYLENE GLYCOL 3350 17 G/17G
17 POWDER, FOR SOLUTION ORAL DAILY
Qty: 14 EACH | Refills: 0 | Status: SHIPPED | OUTPATIENT
Start: 2019-09-10 | End: 2019-09-26

## 2019-09-10 RX ORDER — NAPROXEN 500 MG/1
500 TABLET ORAL 2 TIMES DAILY WITH MEALS
Qty: 14 TABLET | Refills: 0 | Status: SHIPPED | OUTPATIENT
Start: 2019-09-10 | End: 2019-09-26

## 2019-09-10 RX ADMIN — DICYCLOMINE HYDROCHLORIDE 20 MG: 20 TABLET ORAL at 23:27

## 2019-09-10 RX ADMIN — KETOROLAC TROMETHAMINE 15 MG: 30 INJECTION, SOLUTION INTRAMUSCULAR; INTRAVENOUS at 23:27

## 2019-09-10 RX ADMIN — SODIUM CHLORIDE 1000 ML: 0.9 INJECTION, SOLUTION INTRAVENOUS at 23:20

## 2019-09-11 LAB
C TRACH DNA SPEC QL NAA+PROBE: NEGATIVE
N GONORRHOEA DNA SPEC QL NAA+PROBE: NEGATIVE

## 2019-09-11 NOTE — DISCHARGE INSTRUCTIONS
You will receive a call with further instructions if any of your testing today is positive      Otherwise you can use stool softeners and MiraLax as needed for constipation relief, if symptoms worsen please return to emergency department

## 2019-09-11 NOTE — ED PROVIDER NOTES
History  Chief Complaint   Patient presents with    Abdominal Pain     abdomen pain in LUQ, constipation  31-year-old female with past medical history of ovarian cysts presents for evaluation of left lower quadrant abdominal pain which is sharp, nonradiating, associated with some pinkish vaginal discharge that she describes as foul-smelling  No similar symptoms in the past though she did have an ovarian cyst which she was told would involuted on its own in the past   No abdominal surgeries, no nausea no vomiting no fevers no chills, she did have some dysuria, no frequency or urgency  She also states that she has issues with constipation has not gone to the bathroom for 2 days  She tried over-the-counter stool softeners without relief  No melena or hematochezia no pain with defecation  No new sexual partners  She does state that she used to have Depo-Provera for birth control and recently came off of it has been having irregular periods since then  She had a normal menstrual period which and did an the 8th and noticed a vaginal spotting today only  Prior to Admission Medications   Prescriptions Last Dose Informant Patient Reported?  Taking?   acetaminophen (TYLENOL) 325 mg tablet   Yes No   Sig: Take 650 mg by mouth every 6 (six) hours as needed for mild pain   butalbital-acetaminophen-caffeine (FIORICET,ESGIC) -40 mg per tablet   No No   Sig: Take 2 tablets by mouth every 4 (four) hours as needed for headaches   fluticasone (FLONASE) 50 mcg/act nasal spray   No No   Si spray into each nostril daily   ibuprofen (MOTRIN) 600 mg tablet   No No   Sig: Take 1 tablet (600 mg total) by mouth every 6 (six) hours as needed for mild pain   medroxyPROGESTERone (DEPO-PROVERA) 150 mg/mL injection   Yes No   Sig: Inject 150 mg into a muscle every 3 (three) months   naproxen (EC NAPROSYN) 500 MG EC tablet   No No   Sig: Take 1 tablet (500 mg total) by mouth 2 (two) times a day with meals Facility-Administered Medications: None       Past Medical History:   Diagnosis Date    No known health problems        Past Surgical History:   Procedure Laterality Date    NO PAST SURGERIES         History reviewed  No pertinent family history  I have reviewed and agree with the history as documented  Social History     Tobacco Use    Smoking status: Current Every Day Smoker     Packs/day: 0 00    Smokeless tobacco: Never Used   Substance Use Topics    Alcohol use: Yes     Comment: social    Drug use: No        Review of Systems   Constitutional: Negative for appetite change and fever  HENT: Negative for rhinorrhea and sore throat  Eyes: Negative for photophobia and visual disturbance  Respiratory: Negative for cough, chest tightness and wheezing  Cardiovascular: Negative for chest pain, palpitations and leg swelling  Gastrointestinal: Positive for abdominal pain and constipation  Negative for abdominal distention, blood in stool, diarrhea, nausea and vomiting  Genitourinary: Positive for dysuria, vaginal bleeding and vaginal discharge  Negative for flank pain, frequency, hematuria and urgency  Musculoskeletal: Negative for back pain  Skin: Negative for rash  Neurological: Negative for dizziness, weakness and headaches  All other systems reviewed and are negative  Physical Exam  Physical Exam   Constitutional: She is oriented to person, place, and time  She appears well-developed and well-nourished  HENT:   Head: Normocephalic and atraumatic  Eyes: Pupils are equal, round, and reactive to light  EOM are normal    Neck: Normal range of motion  Neck supple  Cardiovascular: Normal rate and regular rhythm  Exam reveals no gallop and no friction rub  No murmur heard  Pulmonary/Chest: Effort normal  She has no wheezes  She has no rales  She exhibits no tenderness  Abdominal: Soft  She exhibits no distension and no mass  There is no rebound and no guarding     Minimal tenderness in left upper and left lower quadrant without any rebound, no guarding  Normal bowel sounds   Genitourinary: Vagina normal    Genitourinary Comments: Small amount of old blood in the vaginal vault, cervix closed, no obvious cervical lesions, no cervical petechiae, no cervical motion tenderness or adnexal tenderness   Neurological: She is alert and oriented to person, place, and time  Skin: Skin is warm and dry  Psychiatric: She has a normal mood and affect  Nursing note and vitals reviewed        Vital Signs  ED Triage Vitals [09/10/19 2217]   Temperature Pulse Respirations Blood Pressure SpO2   97 6 °F (36 4 °C) 73 16 119/78 100 %      Temp Source Heart Rate Source Patient Position - Orthostatic VS BP Location FiO2 (%)   Tympanic Monitor Sitting Left arm --      Pain Score       7           Vitals:    09/10/19 2217 09/10/19 2343   BP: 119/78 105/69   Pulse: 73 80   Patient Position - Orthostatic VS: Sitting Lying         Visual Acuity      ED Medications  Medications   sodium chloride 0 9 % bolus 1,000 mL (0 mL Intravenous Stopped 9/10/19 2346)   dicyclomine (BENTYL) tablet 20 mg (20 mg Oral Given 9/10/19 2327)   ketorolac (TORADOL) injection 15 mg (15 mg Intravenous Given 9/10/19 2327)       Diagnostic Studies  Results Reviewed     Procedure Component Value Units Date/Time    Comprehensive metabolic panel [[id number]]  (Normal) Collected:  09/10/19 2316    Lab Status:  Final result Specimen:  Blood from Line, Venous Updated:  09/10/19 2339     Sodium 139 mmol/L      Potassium 3 7 mmol/L      Chloride 102 mmol/L      CO2 28 mmol/L      ANION GAP 9 mmol/L      BUN 13 mg/dL      Creatinine 0 61 mg/dL      Glucose 88 mg/dL      Calcium 9 8 mg/dL      AST 30 U/L      ALT 27 U/L      Alkaline Phosphatase 67 U/L      Total Protein 8 1 g/dL      Albumin 5 0 g/dL      Total Bilirubin 0 50 mg/dL      eGFR 124 ml/min/1 73sq m     Narrative:       [place] guidelines for Chronic Kidney Disease (CKD):     Stage 1 with normal or high GFR (GFR > 90 mL/min/1 73 square meters)    Stage 2 Mild CKD (GFR = 60-89 mL/min/1 73 square meters)    Stage 3A Moderate CKD (GFR = 45-59 mL/min/1 73 square meters)    Stage 3B Moderate CKD (GFR = 30-44 mL/min/1 73 square meters)    Stage 4 Severe CKD (GFR = 15-29 mL/min/1 73 square meters)    Stage 5 End Stage CKD (GFR <15 mL/min/1 73 square meters)  Note: GFR calculation is accurate only with a steady state creatinine    CBC and differential [[id number]]  (Abnormal) Collected:  09/10/19 2316    Lab Status:  Final result Specimen:  Blood from Line, Venous Updated:  09/10/19 2332     WBC 7 70 Thousand/uL      RBC 4 83 Million/uL      Hemoglobin 14 7 g/dL      Hematocrit 42 5 %      MCV 88 fL      MCH 30 5 pg      MCHC 34 7 g/dL      RDW 13 5 %      MPV 9 8 fL      Platelets 050 Thousands/uL      Neutrophils Relative 66 %      Lymphocytes Relative 25 %      Monocytes Relative 8 %      Eosinophils Relative 1 %      Basophils Relative 0 %      Neutrophils Absolute 5 10 Thousands/µL      Lymphocytes Absolute 1 90 Thousands/µL      Monocytes Absolute 0 60 Thousand/µL      Eosinophils Absolute 0 10 Thousand/µL      Basophils Absolute 0 00 Thousands/µL     Chlamydia/GC amplified DNA by PCR [[id number]] Collected:  09/10/19 2325    Lab Status: In process Specimen:  Genital from Endocervical Updated:  09/10/19 2326    Vaginosis DNA Probe [[id number]] Collected:  09/10/19 2307    Lab Status:   In process Specimen:  Genital from Vaginal Updated:  09/10/19 2312    Urine Microscopic [[id number]]  (Abnormal) Collected:  09/10/19 2227    Lab Status:  Final result Specimen:  Urine, Clean Catch Updated:  09/10/19 2245     RBC, UA 1-2 /hpf      WBC, UA 2-4 /hpf      Epithelial Cells Moderate /hpf      Bacteria, UA Moderate /hpf      MUCUS THREADS Moderate    UA w Reflex to Microscopic w Reflex to Culture [[id number]]  (Abnormal) Collected:  09/10/19 2227    Lab Status:  Final result Specimen:  Urine, Clean Catch Updated:  09/10/19 2245     Color, UA Yellow     Clarity, UA Slightly Cloudy     Specific [place], UA 1 020     pH, UA 5 0     Leukocytes, UA Negative     Nitrite, UA Negative     Protein, UA Negative mg/dl      Glucose, UA Negative mg/dl      Ketones, UA 15 (1+) mg/dl      Bilirubin, UA Negative     Blood, UA 50 0     UROBILINOGEN UA Negative mg/dL     POCT pregnancy, urine [[id number]]  (Normal) Resulted:  09/10/19 2227    Lab Status:  Final result Updated:  09/10/19 2228     EXT PREG TEST UR (Ref: Negative) Negative     Control Valid                 No orders to display              Procedures  Procedures       ED Course  ED Course as of Sep 11 0125   Tue Sep 10, 2019   2308 Discussed with the patient, given mild pain, no cervical motion tenderness, will hold off on CT at this time, will attempt symptomatic treatment and patient will follow up with Ob if symptoms worsen      2338 Feels better, requests to leave                                  MDM  Number of Diagnoses or Management Options  Diagnosis management comments: 21-year-old female with left lower quadrant pain with history of ovarian cysts, pinkish foul-smelling vaginal discharge, will get lab work, CT of the abdomen and sent off GC, chlamydia, vaginosis probe      Disposition  Final diagnoses:   Abdominal pain   Constipation   Vaginal discharge     Time reflects when diagnosis was documented in both MDM as applicable and the Disposition within this note     Time User Action Codes Description Comment    9/10/2019 11:38 PM Linell Marines Add [R10 9] Abdominal pain     9/10/2019 11:38 PM Linell Marines Add [K59 00] Constipation     9/10/2019 11:38 PM Linell Marines Add [N89 8] Vaginal discharge       ED Disposition     ED Disposition Condition Date/Time Comment    Discharge Stable Tue Sep 10, 2019 11:38 PM [address] discharge to home/self care              Follow-up Information     Follow up With Specialties Details Why Contact Info    Lennox Mckee MD Family Medicine Schedule an appointment as soon as possible for a visit  As needed [address]  [address]       [facility] Emergency Department Emergency Medicine  If symptoms worsen [address] [address] [phone]          Discharge Medication List as of 9/10/2019 11:40 PM      START taking these medications    Details   naproxen (NAPROSYN) 500 mg tablet Take 1 tablet (500 mg total) by mouth 2 (two) times a day with meals, Starting Tue 9/10/2019, Print      polyethylene glycol (MIRALAX) 17 g packet Take 17 g by mouth daily, Starting Tue 9/10/2019, Print         CONTINUE these medications which have NOT CHANGED    Details   acetaminophen (TYLENOL) 325 mg tablet Take 650 mg by mouth every 6 (six) hours as needed for mild pain, Historical Med      butalbital-acetaminophen-caffeine (FIORICET,ESGIC) -40 mg per tablet Take 2 tablets by mouth every 4 (four) hours as needed for headaches, Starting Wed 3/20/2019, Print      fluticasone (FLONASE) 50 mcg/act nasal spray 1 spray into each nostril daily, Starting Tue 3/12/2019, Print      ibuprofen (MOTRIN) 600 mg tablet Take 1 tablet (600 mg total) by mouth every 6 (six) hours as needed for mild pain, Starting Fri 2/22/2019, Print      medroxyPROGESTERone (DEPO-PROVERA) 150 mg/mL injection Inject 150 mg into a muscle every 3 (three) months, Historical Med      naproxen (EC NAPROSYN) 500 MG EC tablet Take 1 tablet (500 mg total) by mouth 2 (two) times a day with meals, Starting Tue 3/12/2019, Until Wed 3/11/2020, Print           No discharge procedures on file      ED Provider  Electronically Signed by           Portia Fitch MD  09/11/19 4025

## 2019-09-12 LAB
CANDIDA RRNA VAG QL PROBE: NEGATIVE
G VAGINALIS RRNA GENITAL QL PROBE: POSITIVE
T VAGINALIS RRNA GENITAL QL PROBE: NEGATIVE

## 2019-09-20 ENCOUNTER — HOSPITAL ENCOUNTER (EMERGENCY)
Facility: HOSPITAL | Age: 29
Discharge: HOME | End: 2019-09-20
Attending: EMERGENCY MEDICINE
Payer: COMMERCIAL

## 2019-09-20 ENCOUNTER — HOSPITAL ENCOUNTER (EMERGENCY)
Facility: HOSPITAL | Age: 29
Discharge: HOME | End: 2019-09-20
Attending: EMERGENCY MEDICINE | Admitting: EMERGENCY MEDICINE
Payer: COMMERCIAL

## 2019-09-20 ENCOUNTER — APPOINTMENT (EMERGENCY)
Dept: CT IMAGING | Facility: HOSPITAL | Age: 29
End: 2019-09-20
Payer: COMMERCIAL

## 2019-09-20 VITALS
SYSTOLIC BLOOD PRESSURE: 110 MMHG | DIASTOLIC BLOOD PRESSURE: 68 MMHG | RESPIRATION RATE: 16 BRPM | WEIGHT: 115.3 LBS | BODY MASS INDEX: 24.1 KG/M2 | HEART RATE: 76 BPM | OXYGEN SATURATION: 99 % | TEMPERATURE: 98.7 F

## 2019-09-20 VITALS
RESPIRATION RATE: 18 BRPM | BODY MASS INDEX: 24.06 KG/M2 | SYSTOLIC BLOOD PRESSURE: 112 MMHG | WEIGHT: 115.1 LBS | DIASTOLIC BLOOD PRESSURE: 67 MMHG | HEART RATE: 75 BPM | TEMPERATURE: 98.2 F | OXYGEN SATURATION: 100 %

## 2019-09-20 DIAGNOSIS — R51.9 HEADACHE: Primary | ICD-10-CM

## 2019-09-20 LAB
EXT PREG TEST URINE: NORMAL
EXT. CONTROL ED NAV: NORMAL

## 2019-09-20 PROCEDURE — 70450 CT HEAD/BRAIN W/O DYE: CPT

## 2019-09-20 PROCEDURE — 96361 HYDRATE IV INFUSION ADD-ON: CPT

## 2019-09-20 PROCEDURE — 96375 TX/PRO/DX INJ NEW DRUG ADDON: CPT

## 2019-09-20 PROCEDURE — 99284 EMERGENCY DEPT VISIT MOD MDM: CPT | Performed by: EMERGENCY MEDICINE

## 2019-09-20 PROCEDURE — 99283 EMERGENCY DEPT VISIT LOW MDM: CPT

## 2019-09-20 PROCEDURE — 96372 THER/PROPH/DIAG INJ SC/IM: CPT

## 2019-09-20 PROCEDURE — 99284 EMERGENCY DEPT VISIT MOD MDM: CPT

## 2019-09-20 PROCEDURE — 81025 URINE PREGNANCY TEST: CPT | Performed by: EMERGENCY MEDICINE

## 2019-09-20 PROCEDURE — 96374 THER/PROPH/DIAG INJ IV PUSH: CPT

## 2019-09-20 RX ORDER — METOCLOPRAMIDE HYDROCHLORIDE 5 MG/ML
10 INJECTION INTRAMUSCULAR; INTRAVENOUS ONCE
Status: COMPLETED | OUTPATIENT
Start: 2019-09-20 | End: 2019-09-20

## 2019-09-20 RX ORDER — DIPHENHYDRAMINE HYDROCHLORIDE 50 MG/ML
50 INJECTION INTRAMUSCULAR; INTRAVENOUS ONCE
Status: COMPLETED | OUTPATIENT
Start: 2019-09-20 | End: 2019-09-20

## 2019-09-20 RX ORDER — BUTALBITAL, ACETAMINOPHEN AND CAFFEINE 50; 325; 40 MG/1; MG/1; MG/1
1 TABLET ORAL EVERY 4 HOURS PRN
Qty: 30 TABLET | Refills: 0 | Status: SHIPPED | OUTPATIENT
Start: 2019-09-20 | End: 2019-09-26

## 2019-09-20 RX ORDER — DEXAMETHASONE 4 MG/1
12 TABLET ORAL ONCE
Qty: 3 TABLET | Refills: 0 | Status: SHIPPED | OUTPATIENT
Start: 2019-09-20 | End: 2019-09-20

## 2019-09-20 RX ORDER — KETOROLAC TROMETHAMINE 30 MG/ML
30 INJECTION, SOLUTION INTRAMUSCULAR; INTRAVENOUS ONCE
Status: COMPLETED | OUTPATIENT
Start: 2019-09-20 | End: 2019-09-20

## 2019-09-20 RX ORDER — NAPROXEN 500 MG/1
500 TABLET ORAL 2 TIMES DAILY PRN
Qty: 20 TABLET | Refills: 0 | Status: SHIPPED | OUTPATIENT
Start: 2019-09-20 | End: 2019-09-26

## 2019-09-20 RX ORDER — BUTALBITAL, ACETAMINOPHEN AND CAFFEINE 50; 325; 40 MG/1; MG/1; MG/1
1 TABLET ORAL ONCE
Status: COMPLETED | OUTPATIENT
Start: 2019-09-20 | End: 2019-09-20

## 2019-09-20 RX ORDER — KETOROLAC TROMETHAMINE 30 MG/ML
15 INJECTION, SOLUTION INTRAMUSCULAR; INTRAVENOUS ONCE
Status: COMPLETED | OUTPATIENT
Start: 2019-09-20 | End: 2019-09-20

## 2019-09-20 RX ADMIN — KETOROLAC TROMETHAMINE 30 MG: 30 INJECTION, SOLUTION INTRAMUSCULAR; INTRAVENOUS at 01:22

## 2019-09-20 RX ADMIN — BUTALBITAL, ACETAMINOPHEN, AND CAFFEINE 1 TABLET: 50; 325; 40 TABLET ORAL at 01:24

## 2019-09-20 RX ADMIN — SODIUM CHLORIDE 1000 ML: 0.9 INJECTION, SOLUTION INTRAVENOUS at 13:34

## 2019-09-20 RX ADMIN — DIPHENHYDRAMINE HYDROCHLORIDE 50 MG: 50 INJECTION INTRAMUSCULAR; INTRAVENOUS at 13:34

## 2019-09-20 RX ADMIN — KETOROLAC TROMETHAMINE 15 MG: 30 INJECTION, SOLUTION INTRAMUSCULAR at 13:38

## 2019-09-20 RX ADMIN — METOCLOPRAMIDE 10 MG: 5 INJECTION, SOLUTION INTRAMUSCULAR; INTRAVENOUS at 13:39

## 2019-09-20 NOTE — ED NOTES
Patient reports that her headache pain is going down - reports it is 6 - room is darkened - patient is on cell phone - texting  No apparent distress is noted       Ada Paul RN  09/20/19 0746

## 2019-09-20 NOTE — ED NOTES
Patient reports that she is feeling better - Dr Tatiana Call in to see patient - reviewed results of testing and discharge instructions and follow up care       Ada Paul RN  09/20/19 1742

## 2019-09-20 NOTE — ED PROVIDER NOTES
History  Chief Complaint   Patient presents with    Headache     PATIENT WAS SEEN IN ER THIS am AND WAS GIVEN PILLS FOR HER HEADACHE AND THE HEADACHE HAS NOT GONE AWAY     Patient is a 20-year-old female with history of headaches, no official diagnosis of migraines  She returns the emergency room for complaints of recurring headache  States that she was seen overnight, was treated felt better, was given prescription for what appears to be Fioricet  States that she took the medication at home after her headache returned and her symptoms did not improve  She states that she had a CT scan last night that was negative, review of medical records shows correct  Denies any focal weakness numbness or tingling, a changes in her vision  She states the headache starts in the front goes to the back  Her current headache was gradual in onset  Prior to Admission Medications   Prescriptions Last Dose Informant Patient Reported?  Taking?   acetaminophen (TYLENOL) 325 mg tablet   Yes No   Sig: Take 650 mg by mouth every 6 (six) hours as needed for mild pain   butalbital-acetaminophen-caffeine (FIORICET,ESGIC) -40 mg per tablet   No No   Sig: Take 2 tablets by mouth every 4 (four) hours as needed for headaches   butalbital-acetaminophen-caffeine (FIORICET,ESGIC) -40 mg per tablet   No No   Sig: Take 1 tablet by mouth every 4 (four) hours as needed for headaches   fluticasone (FLONASE) 50 mcg/act nasal spray   No No   Si spray into each nostril daily   ibuprofen (MOTRIN) 600 mg tablet   No No   Sig: Take 1 tablet (600 mg total) by mouth every 6 (six) hours as needed for mild pain   medroxyPROGESTERone (DEPO-PROVERA) 150 mg/mL injection   Yes No   Sig: Inject 150 mg into a muscle every 3 (three) months   naproxen (EC NAPROSYN) 500 MG EC tablet   No No   Sig: Take 1 tablet (500 mg total) by mouth 2 (two) times a day with meals   Patient not taking: Reported on 2019   naproxen (NAPROSYN) 500 mg tablet No No   Sig: Take 1 tablet (500 mg total) by mouth 2 (two) times a day with meals   Patient not taking: Reported on 9/20/2019   naproxen (NAPROSYN) 500 mg tablet   No No   Sig: Take 1 tablet (500 mg total) by mouth 2 (two) times a day as needed for mild pain or moderate pain for up to 10 days   polyethylene glycol (MIRALAX) 17 g packet   No No   Sig: Take 17 g by mouth daily      Facility-Administered Medications: None       Past Medical History:   Diagnosis Date    Frequent headaches     No known health problems        Past Surgical History:   Procedure Laterality Date    NO PAST SURGERIES         History reviewed  No pertinent family history  I have reviewed and agree with the history as documented  Social History     Tobacco Use    Smoking status: Current Every Day Smoker     Packs/day: 0 25     Types: Cigarettes    Smokeless tobacco: Never Used   Substance Use Topics    Alcohol use: Not Currently    Drug use: No        Review of Systems   Constitutional: Negative  Negative for chills and fever  HENT: Negative  Negative for rhinorrhea, sore throat, trouble swallowing and voice change  Eyes: Negative  Negative for pain and visual disturbance  Respiratory: Negative  Negative for cough, shortness of breath and wheezing  Cardiovascular: Negative  Negative for chest pain and palpitations  Gastrointestinal: Negative for abdominal pain, diarrhea, nausea and vomiting  Genitourinary: Negative  Negative for dysuria and frequency  Musculoskeletal: Negative  Negative for neck pain and neck stiffness  Skin: Negative  Negative for rash  Neurological: Positive for headaches  Negative for dizziness, speech difficulty, weakness, light-headedness and numbness  Physical Exam  Physical Exam   Constitutional: She is oriented to person, place, and time  She appears well-developed and well-nourished  No distress  HENT:   Head: Normocephalic and atraumatic     Mouth/Throat: Oropharynx is clear and moist    Eyes: Pupils are equal, round, and reactive to light  Conjunctivae and EOM are normal    Neck: Normal range of motion  Neck supple  No tracheal deviation present  Cardiovascular: Normal rate, regular rhythm and intact distal pulses  Pulmonary/Chest: Effort normal and breath sounds normal  No respiratory distress  She has no wheezes  She has no rales  Abdominal: Soft  Bowel sounds are normal  She exhibits no distension  There is no tenderness  There is no rebound and no guarding  Musculoskeletal: Normal range of motion  She exhibits no tenderness or deformity  Neurological: She is alert and oriented to person, place, and time  She has normal strength  No cranial nerve deficit or sensory deficit  She exhibits normal muscle tone  Coordination and gait normal  GCS eye subscore is 4  GCS verbal subscore is 5  GCS motor subscore is 6  Skin: Skin is warm and dry  Capillary refill takes less than 2 seconds  No rash noted  Psychiatric: She has a normal mood and affect  Her behavior is normal    Nursing note and vitals reviewed        Vital Signs  ED Triage Vitals [09/20/19 1311]   Temperature Pulse Respirations Blood Pressure SpO2   98 7 °F (37 1 °C) 79 16 109/65 98 %      Temp Source Heart Rate Source Patient Position - Orthostatic VS BP Location FiO2 (%)   Tympanic Monitor Sitting Left arm --      Pain Score       Worst Possible Pain           Vitals:    09/20/19 1311 09/20/19 1503   BP: 109/65 110/68   Pulse: 79 76   Patient Position - Orthostatic VS: Sitting Lying         Visual Acuity  Visual Acuity      Most Recent Value   L Pupil Size (mm)  3   R Pupil Size (mm)  3          ED Medications  Medications   sodium chloride 0 9 % bolus 1,000 mL (0 mL Intravenous Stopped 9/20/19 1501)   diphenhydrAMINE (BENADRYL) injection 50 mg (50 mg Intravenous Given 9/20/19 1334)   metoclopramide (REGLAN) injection 10 mg (10 mg Intravenous Given 9/20/19 1339)   ketorolac (TORADOL) injection 15 mg (15 mg Intravenous Given 9/20/19 1338)       Diagnostic Studies  Results Reviewed     None                 No orders to display              Procedures  Procedures       ED Course  ED Course as of Sep 20 1508   Fri Sep 20, 2019   1418 Patient pain significantly improved  Will let IVF finish  Neuro reassessment is benign  Patient can be discharged for outpatient follow up with PCP and Neurology  MDM  Number of Diagnoses or Management Options  Headache:   Diagnosis management comments: Patient is a 23-year-old female presenting for recurrent headache  Will provide analgesia, IV fluids  He patient already had a CT scan earlier this morning, with no new focal neurologic deficit do not feel repeat imaging is warranted at this time  Review with the patient that we may not be able to completely alleviate her pain, she states that if it gets down to a 6/10 she would feel comfortable going home  Currently rates her pain as a 10/10  Advised her that her pain cannot be controlled in the emergency room now recommendation would be for her to be admitted for intractable pain management  Review of records show that she received Toradol, Fioricet had a negative pregnancy test as well as a negative CT scan during her previous ER visit  Patient is now of pain-free  Ambulating without difficulty  She has her significant other at the bedside to drive her home and she is slightly drowsy from taking Benadryl  She was advised to follow up with the primary care doctor and see a neurologist regarding her frequent headaches        Disposition  Final diagnoses:   Headache     Time reflects when diagnosis was documented in both MDM as applicable and the Disposition within this note     Time User Action Codes Description Comment    9/20/2019  2:43 PM Esther Mattson Add [R51] Headache       ED Disposition     ED Disposition Condition Date/Time Comment    Discharge Stable Fri Sep 20, 2019  2:42 PM Symone Vázquez Harish Holly discharge to home/self care  Follow-up Information     Follow up With Specialties Details Why Contact Info Additional Information    Puja Perales MD Family Medicine In 3 days  [address]  [address] [facility]  [address]        [facility]  As needed [address] [facility] [id number] [phone] [facility] [place], [place], [address], [id number]          Patient's Medications   Discharge Prescriptions    DEXAMETHASONE (DECADRON) 4 MG TABLET    Take 3 tablets (12 mg total) by mouth once for 1 dose       Start Date: 9/20/2019 End Date: 9/20/2019       Order Dose: 12 mg       Quantity: 3 tablet    Refills: 0     No discharge procedures on file      ED Provider  Electronically Signed by           Collin Pinon,   09/20/19 6626

## 2019-09-20 NOTE — ED PROVIDER NOTES
History  Chief Complaint   Patient presents with    Headache     pt states that for 3 days she has had a HA  pt states that she took tylenol last at 1430 yesterday  pt denies any N/V/D  pt denies any blurried vision  29 yo female with onset 3 days ago of global headache which is recurrent "when I get these the top of my head sinks in"  Has tried tylenol for pain with minimal relief  Neuro intact  History provided by:  Patient and significant other   used: No    Headache   Pain location:  Generalized  Quality:  Dull  Radiates to:  Does not radiate  Severity currently:  810  Severity at highest:  8/10  Onset quality:  Gradual  Duration:  3 days  Timing:  Constant  Progression:  Unchanged  Chronicity:  Recurrent  Similar to prior headaches: yes    Relieved by:  Nothing  Worsened by:  Nothing  Ineffective treatments:  None tried  Associated symptoms: no abdominal pain, no back pain, no blurred vision, no diarrhea, no dizziness, no fatigue, no fever, no nausea, no neck pain, no neck stiffness, no paresthesias, no photophobia, no seizures, no sore throat and no vomiting        Prior to Admission Medications   Prescriptions Last Dose Informant Patient Reported?  Taking?   acetaminophen (TYLENOL) 325 mg tablet 2019 at Unknown time  Yes Yes   Sig: Take 650 mg by mouth every 6 (six) hours as needed for mild pain   butalbital-acetaminophen-caffeine (FIORICET,ESGIC) -40 mg per tablet Unknown at Unknown time  No No   Sig: Take 2 tablets by mouth every 4 (four) hours as needed for headaches   fluticasone (FLONASE) 50 mcg/act nasal spray More than a month at Unknown time  No No   Si spray into each nostril daily   ibuprofen (MOTRIN) 600 mg tablet More than a month at Unknown time  No No   Sig: Take 1 tablet (600 mg total) by mouth every 6 (six) hours as needed for mild pain   medroxyPROGESTERone (DEPO-PROVERA) 150 mg/mL injection More than a month at Unknown time  Yes No   Sig: Inject 150 mg into a muscle every 3 (three) months   naproxen (EC NAPROSYN) 500 MG EC tablet Not Taking at Unknown time  No No   Sig: Take 1 tablet (500 mg total) by mouth 2 (two) times a day with meals   Patient not taking: Reported on 9/20/2019   naproxen (NAPROSYN) 500 mg tablet Not Taking at Unknown time  No No   Sig: Take 1 tablet (500 mg total) by mouth 2 (two) times a day with meals   Patient not taking: Reported on 9/20/2019   polyethylene glycol (MIRALAX) 17 g packet Past Month at Unknown time  No Yes   Sig: Take 17 g by mouth daily      Facility-Administered Medications: None       Past Medical History:   Diagnosis Date    No known health problems        Past Surgical History:   Procedure Laterality Date    NO PAST SURGERIES         History reviewed  No pertinent family history  I have reviewed and agree with the history as documented  Social History     Tobacco Use    Smoking status: Current Every Day Smoker     Packs/day: 0 25     Types: Cigarettes    Smokeless tobacco: Never Used   Substance Use Topics    Alcohol use: Yes     Comment: social    Drug use: No        Review of Systems   Constitutional: Negative for appetite change, chills, fatigue and fever  HENT: Negative for sore throat  Eyes: Negative for blurred vision, photophobia and visual disturbance  Respiratory: Negative for shortness of breath  Cardiovascular: Negative for chest pain  Gastrointestinal: Negative for abdominal pain, diarrhea, nausea and vomiting  Genitourinary: Negative for dysuria, frequency, vaginal bleeding and vaginal discharge  Musculoskeletal: Negative for back pain, neck pain and neck stiffness  Skin: Negative for pallor and rash  Allergic/Immunologic: Negative for immunocompromised state  Neurological: Positive for headaches  Negative for dizziness, seizures, light-headedness and paresthesias  Psychiatric/Behavioral: Negative for behavioral problems and confusion     All other systems reviewed and are negative  Physical Exam  Physical Exam   Constitutional: She is oriented to person, place, and time  She appears well-developed and well-nourished  No distress  HENT:   Head: Normocephalic and atraumatic  Right Ear: External ear normal    Left Ear: External ear normal    Mouth/Throat: Oropharynx is clear and moist    No depression in skull felt   Eyes: Pupils are equal, round, and reactive to light  EOM are normal    Neck: Normal range of motion  Neck supple  Cardiovascular: Normal rate and regular rhythm  No murmur heard  Pulmonary/Chest: Effort normal and breath sounds normal  No respiratory distress  Abdominal: Soft  Bowel sounds are normal    Musculoskeletal: Normal range of motion  Neurological: She is alert and oriented to person, place, and time  She displays normal reflexes  No cranial nerve deficit or sensory deficit  She exhibits normal muscle tone  Coordination normal    Skin: Skin is warm  Capillary refill takes less than 2 seconds  No rash noted  No pallor  Psychiatric: She has a normal mood and affect  Her behavior is normal    Nursing note and vitals reviewed        Vital Signs  ED Triage Vitals   Temperature Pulse Respirations Blood Pressure SpO2   09/20/19 0105 09/20/19 0105 09/20/19 0105 09/20/19 0105 09/20/19 0105   98 2 °F (36 8 °C) 77 18 130/80 100 %      Temp Source Heart Rate Source Patient Position - Orthostatic VS BP Location FiO2 (%)   09/20/19 0105 09/20/19 0105 09/20/19 0105 09/20/19 0105 --   Tympanic Monitor Sitting Left arm       Pain Score       09/20/19 0122       8           Vitals:    09/20/19 0105 09/20/19 0200   BP: 130/80 117/69   Pulse: 77 70   Patient Position - Orthostatic VS: Sitting Lying         Visual Acuity      ED Medications  Medications   ketorolac (TORADOL) injection 30 mg (30 mg Intramuscular Given 9/20/19 0122)   butalbital-acetaminophen-caffeine (FIORICET,ESGIC) -40 mg per tablet 1 tablet (1 tablet Oral Given 9/20/19 0124) Diagnostic Studies  Results Reviewed     Procedure Component Value Units Date/Time    POCT pregnancy, urine [[id number]]  (Normal) Resulted:  09/20/19 0119    Lab Status:  Final result Updated:  09/20/19 0119     EXT PREG TEST UR (Ref: Negative) Neg  Control Negative  CT head without contrast   Final Result by Hanh Smith MD (09/20 5316)      No acute intracranial hemorrhage, midline shift, or mass effect  Workstation performed: [id number]                    Procedures  Procedures       ED Course  ED Course as of Sep 20 0229   Fri Sep 20, 2019   0104 Pt seen and examined  31 yo female with onset 3 days ago of global headache which is recurrent "when I get these the top of my head sinks in"  Has tried tylenol for pain with minimal relief  Neuro intact  Will check CT head and give IM toradol and fiorocet  0128 U preg neg - pt medicated and taken for CT head  0226 CT shows no acute intracranial hemorrhage, midline shift, or mass effect  Pt feels much better, will d/c home  MDM    Disposition  Final diagnoses:   Headache     Time reflects when diagnosis was documented in both MDM as applicable and the Disposition within this note     Time User Action Codes Description Comment    9/20/2019  1:17 AM [place], [address] Headache       ED Disposition     ED Disposition Condition Date/Time Comment    Discharge Stable Fri Sep 20, 2019  1:17 AM [address] discharge to home/self care              Follow-up Information     Follow up With Specialties Details Why Contact Info    Perla Rg MD Family Medicine Schedule an appointment as soon as possible for a visit  As needed [address]  [address] [facility]  [address]             Patient's Medications   Discharge Prescriptions    BUTALBITAL-ACETAMINOPHEN-CAFFEINE (FIORICET,ESGIC) -40 MG PER TABLET    Take 1 tablet by mouth every 4 (four) hours as needed for headaches       Start Date: 9/20/2019 End Date: --       Order Dose: 1 tablet       Quantity: 30 tablet    Refills: 0    NAPROXEN (NAPROSYN) 500 MG TABLET    Take 1 tablet (500 mg total) by mouth 2 (two) times a day as needed for mild pain or moderate pain for up to 10 days       Start Date: 9/20/2019 End Date: 9/30/2019       Order Dose: 500 mg       Quantity: 20 tablet    Refills: 0     No discharge procedures on file      ED Provider  Electronically Signed by           Carmela Otoole DO  09/20/19 8774

## 2019-09-26 ENCOUNTER — OFFICE VISIT (OUTPATIENT)
Dept: FAMILY MEDICINE CLINIC | Facility: CLINIC | Age: 29
End: 2019-09-26

## 2019-09-26 VITALS
RESPIRATION RATE: 16 BRPM | BODY MASS INDEX: 25.46 KG/M2 | DIASTOLIC BLOOD PRESSURE: 60 MMHG | HEART RATE: 78 BPM | SYSTOLIC BLOOD PRESSURE: 102 MMHG | OXYGEN SATURATION: 98 % | TEMPERATURE: 97 F | HEIGHT: 57 IN | WEIGHT: 118 LBS

## 2019-09-26 DIAGNOSIS — G43.009 MIGRAINE WITHOUT AURA AND WITHOUT STATUS MIGRAINOSUS, NOT INTRACTABLE: Primary | ICD-10-CM

## 2019-09-26 DIAGNOSIS — Z13.220 SCREENING CHOLESTEROL LEVEL: ICD-10-CM

## 2019-09-26 PROCEDURE — 99204 OFFICE O/P NEW MOD 45 MIN: CPT | Performed by: PHYSICIAN ASSISTANT

## 2019-09-26 PROCEDURE — 3725F SCREEN DEPRESSION PERFORMED: CPT | Performed by: PHYSICIAN ASSISTANT

## 2019-09-26 RX ORDER — TOPIRAMATE 25 MG/1
TABLET ORAL
Qty: 180 TABLET | Refills: 0 | Status: SHIPPED | OUTPATIENT
Start: 2019-09-26 | End: 2019-10-30

## 2019-09-26 RX ORDER — RIZATRIPTAN BENZOATE 10 MG/1
10 TABLET ORAL ONCE AS NEEDED
Qty: 9 TABLET | Refills: 0 | Status: SHIPPED | OUTPATIENT
Start: 2019-09-26 | End: 2019-10-30

## 2019-09-26 NOTE — PROGRESS NOTES
Assessment/Plan:    Migraine without aura and without status migrainosus, not intractable  Possible migraines because of patient's headaches  Patient had no relieve with Fioricet  At this time will order additional lab work for further evaluation  CT scan did come back negative  May consider an MRI in the future  At this time will send over Topamax to see if this will help decrease the frequency of episodes  Can also use Maxalt as needed when headaches do occur  Discussed side effects medication with patient  Diagnoses and all orders for this visit:    Migraine without aura and without status migrainosus, not intractable  -     CBC and differential; Future  -     Comprehensive metabolic panel; Future  -     C-reactive protein; Future  -     Magnesium; Future  -     Lyme Antibody Profile with reflex to WB; Future  -     Folate; Future  -     Vitamin B12; Future  -     topiramate (TOPAMAX) 25 mg tablet; Take 1 tablet had a for the 1st week, then 1 tablet twice a day  -     rizatriptan (MAXALT) 10 MG tablet; Take 1 tablet (10 mg total) by mouth once as needed for migraine for up to 1 dose May repeat in 2 hours if needed    Screening cholesterol level  -     Lipid panel; Future          Subjective:      Patient ID: Clemente Bonilla is a 30 y o  female  23-year-old female presenting to [facility] care  Patient has concerns headaches  States that symptoms started 1 year ago  Headaches occur once a week, can last up to 3 days  Pain is a throbbing sensation located in the frontal lobes, on top of her head  Believe she has swelling that occurs on top of her head when she has headaches  Denies any aura before headaches occur  At times will have photophobia and nausea when headaches occur  Denies any triggers for the headaches  States that she eats a healthy diet, drinks plenty water throughout the day  Does have episodes of difficulty sleeping    Denies any increased stress, or symptoms of depression  She personally does not believe she has any anxiety, but states that 1 of her friends as noted she has symptoms of anxiety  Smokes approximately 3-4 cigarettes a day  Denies any alcohol or drug use  Currently not taking any prescription medications  Has tried Tylenol and ibuprofen for headaches without any relief  Was given Fioricet from the ED, but states that these have not helped with her headaches  The following portions of the patient's history were reviewed and updated as appropriate:   She  has a past medical history of Frequent headaches and No known health problems  She   Patient Active Problem List    Diagnosis Date Noted    Migraine without aura and without status migrainosus, not intractable 09/26/2019     She  has a past surgical history that includes No past surgeries  Her family history is not on file  She  reports that she has been smoking cigarettes  She has been smoking about 0 25 packs per day  She has never used smokeless tobacco  She reports that she drank alcohol  She reports that she does not use drugs  Current Outpatient Medications   Medication Sig Dispense Refill    rizatriptan (MAXALT) 10 MG tablet Take 1 tablet (10 mg total) by mouth once as needed for migraine for up to 1 dose May repeat in 2 hours if needed 9 tablet 0    topiramate (TOPAMAX) 25 mg tablet Take 1 tablet had a for the 1st week, then 1 tablet twice a day  180 tablet 0     No current facility-administered medications for this visit  She is allergic to apple       Review of Systems   Constitutional: Negative for activity change, appetite change, chills, diaphoresis, fatigue, fever and unexpected weight change  HENT: Negative for congestion, postnasal drip, rhinorrhea, sinus pressure and sore throat  Eyes: Positive for photophobia  Negative for visual disturbance  Respiratory: Negative for cough, chest tightness, shortness of breath and wheezing      Cardiovascular: Negative for chest pain, palpitations and leg swelling  Gastrointestinal: Positive for nausea  Negative for abdominal pain, constipation, diarrhea and vomiting  Endocrine: Negative for polydipsia, polyphagia and polyuria  Genitourinary: Negative for dysuria and hematuria  Musculoskeletal: Negative for arthralgias and myalgias  Skin: Negative for rash and wound  Neurological: Positive for headaches  Negative for dizziness, weakness, light-headedness and numbness  Hematological: Negative for adenopathy  Psychiatric/Behavioral: Negative for dysphoric mood and sleep disturbance  The patient is not nervous/anxious  Objective:      /60 (BP Location: Left arm, Patient Position: Sitting, Cuff Size: Standard)   Pulse 78   Temp (!) 97 °F (36 1 °C) (Temporal)   Resp 16   Ht 4' 8 5" (1 435 m)   Wt 53 5 kg (118 lb)   LMP 09/08/2019   SpO2 98%   Breastfeeding? No   BMI 25 99 kg/m²          Physical Exam   Constitutional: She is oriented to person, place, and time  She appears well-developed and well-nourished  No distress  HENT:   Right Ear: External ear normal    Left Ear: External ear normal    Nose: Nose normal    Mouth/Throat: Oropharynx is clear and moist    Eyes: Pupils are equal, round, and reactive to light  Conjunctivae and EOM are normal    Neck: Normal range of motion  Neck supple  No JVD present  Cardiovascular: Normal rate, regular rhythm and normal heart sounds  Exam reveals no gallop and no friction rub  No murmur heard  Pulmonary/Chest: Effort normal and breath sounds normal  No respiratory distress  She has no wheezes  She has no rales  Abdominal: Soft  Bowel sounds are normal  She exhibits no distension  There is no tenderness  There is no rebound and no guarding  Musculoskeletal: Normal range of motion  She exhibits no edema  Lymphadenopathy:     She has no cervical adenopathy  Neurological: She is alert and oriented to person, place, and time  She displays normal reflexes  No cranial nerve deficit  She exhibits normal muscle tone  Coordination normal    Skin: She is not diaphoretic  No erythema  Psychiatric: She has a normal mood and affect  Her behavior is normal  Thought content normal    Nursing note and vitals reviewed

## 2019-09-26 NOTE — ASSESSMENT & PLAN NOTE
Possible migraines because of patient's headaches  Patient had no relieve with Fioricet  At this time will order additional lab work for further evaluation  CT scan did come back negative  May consider an MRI in the future  At this time will send over Topamax to see if this will help decrease the frequency of episodes  Can also use Maxalt as needed when headaches do occur  Discussed side effects medication with patient

## 2019-10-15 ENCOUNTER — HOSPITAL ENCOUNTER (EMERGENCY)
Facility: HOSPITAL | Age: 29
Discharge: HOME | End: 2019-10-15
Attending: EMERGENCY MEDICINE
Payer: COMMERCIAL

## 2019-10-15 VITALS
BODY MASS INDEX: 25.92 KG/M2 | HEART RATE: 81 BPM | DIASTOLIC BLOOD PRESSURE: 65 MMHG | RESPIRATION RATE: 18 BRPM | SYSTOLIC BLOOD PRESSURE: 102 MMHG | OXYGEN SATURATION: 100 % | TEMPERATURE: 97.2 F | WEIGHT: 117.7 LBS

## 2019-10-15 DIAGNOSIS — M54.10 RADICULOPATHY: ICD-10-CM

## 2019-10-15 DIAGNOSIS — M54.50 ACUTE LOW BACK PAIN: Primary | ICD-10-CM

## 2019-10-15 LAB
BACTERIA UR QL AUTO: ABNORMAL /HPF
BILIRUB UR QL STRIP: NEGATIVE
CLARITY UR: CLEAR
COLOR UR: YELLOW
EXT PREG TEST URINE: NEGATIVE
EXT. CONTROL ED NAV: NORMAL
GLUCOSE UR STRIP-MCNC: NEGATIVE MG/DL
HGB UR QL STRIP.AUTO: NEGATIVE
KETONES UR STRIP-MCNC: ABNORMAL MG/DL
LEUKOCYTE ESTERASE UR QL STRIP: 100
MUCOUS THREADS UR QL AUTO: ABNORMAL
NITRITE UR QL STRIP: NEGATIVE
NON-SQ EPI CELLS URNS QL MICRO: ABNORMAL /HPF
PH UR STRIP.AUTO: 7 [PH]
PROT UR STRIP-MCNC: NEGATIVE MG/DL
RBC #/AREA URNS AUTO: ABNORMAL /HPF
SP GR UR STRIP.AUTO: 1.01 (ref 1–1.04)
UROBILINOGEN UA: 1 MG/DL
WBC #/AREA URNS AUTO: ABNORMAL /HPF

## 2019-10-15 PROCEDURE — 81001 URINALYSIS AUTO W/SCOPE: CPT | Performed by: EMERGENCY MEDICINE

## 2019-10-15 PROCEDURE — 81025 URINE PREGNANCY TEST: CPT | Performed by: EMERGENCY MEDICINE

## 2019-10-15 PROCEDURE — 96372 THER/PROPH/DIAG INJ SC/IM: CPT

## 2019-10-15 PROCEDURE — 99283 EMERGENCY DEPT VISIT LOW MDM: CPT

## 2019-10-15 PROCEDURE — 99284 EMERGENCY DEPT VISIT MOD MDM: CPT | Performed by: EMERGENCY MEDICINE

## 2019-10-15 RX ORDER — KETOROLAC TROMETHAMINE 30 MG/ML
15 INJECTION, SOLUTION INTRAMUSCULAR; INTRAVENOUS ONCE
Status: COMPLETED | OUTPATIENT
Start: 2019-10-15 | End: 2019-10-15

## 2019-10-15 RX ORDER — NAPROXEN 500 MG/1
500 TABLET ORAL 2 TIMES DAILY WITH MEALS
Qty: 20 TABLET | Refills: 0 | Status: SHIPPED | OUTPATIENT
Start: 2019-10-15 | End: 2019-10-30

## 2019-10-15 RX ORDER — CYCLOBENZAPRINE HCL 10 MG
10 TABLET ORAL ONCE
Status: COMPLETED | OUTPATIENT
Start: 2019-10-15 | End: 2019-10-15

## 2019-10-15 RX ORDER — CYCLOBENZAPRINE HCL 10 MG
10 TABLET ORAL 2 TIMES DAILY PRN
Qty: 20 TABLET | Refills: 0 | Status: SHIPPED | OUTPATIENT
Start: 2019-10-15 | End: 2019-10-30

## 2019-10-15 RX ADMIN — KETOROLAC TROMETHAMINE 15 MG: 30 INJECTION, SOLUTION INTRAMUSCULAR at 02:10

## 2019-10-15 RX ADMIN — CYCLOBENZAPRINE HYDROCHLORIDE 10 MG: 10 TABLET, FILM COATED ORAL at 02:09

## 2019-10-15 NOTE — ED PROVIDER NOTES
History  Chief Complaint   Patient presents with    Back Pain     pt states that 3 days ago she started with right sided back pain  pt states that the pain travels down the right leg  pt denies taking any OTC meds     29 y/o female c/o R sided back pain radiating down RLE for three days duration  Tylenol taken at home without relief  Denies any prior injury  No incontinence or saddle anesthesia  Pain worsens with bending forward/flexion  No urinary symptoms  Prior to Admission Medications   Prescriptions Last Dose Informant Patient Reported? Taking?   rizatriptan (MAXALT) 10 MG tablet   No No   Sig: Take 1 tablet (10 mg total) by mouth once as needed for migraine for up to 1 dose May repeat in 2 hours if needed   topiramate (TOPAMAX) 25 mg tablet   No No   Sig: Take 1 tablet had a for the 1st week, then 1 tablet twice a day  Facility-Administered Medications: None       Past Medical History:   Diagnosis Date    Frequent headaches     No known health problems        Past Surgical History:   Procedure Laterality Date    NO PAST SURGERIES         History reviewed  No pertinent family history  I have reviewed and agree with the history as documented  Social History     Tobacco Use    Smoking status: Current Every Day Smoker     Packs/day: 0 25     Types: Cigarettes    Smokeless tobacco: Never Used   Substance Use Topics    Alcohol use: Not Currently    Drug use: No        Review of Systems   Musculoskeletal: Positive for back pain  All other systems reviewed and are negative  Physical Exam  Physical Exam   Constitutional: She is oriented to person, place, and time  She appears well-developed and well-nourished  HENT:   Head: Normocephalic and atraumatic  Right Ear: External ear normal    Left Ear: External ear normal    Mouth/Throat: Oropharynx is clear and moist    Eyes: Pupils are equal, round, and reactive to light   Conjunctivae and EOM are normal    Neck: Normal range of motion  Neck supple  Cardiovascular: Normal rate and regular rhythm  Pulmonary/Chest: Effort normal and breath sounds normal  No respiratory distress  Abdominal: Soft  Bowel sounds are normal    Musculoskeletal: Normal range of motion  Lumbar back: She exhibits tenderness, pain and spasm  She exhibits normal range of motion, no bony tenderness, no swelling, no deformity, no laceration and normal pulse  Neurological: She is alert and oriented to person, place, and time  Skin: Skin is warm and dry  Capillary refill takes less than 2 seconds  Psychiatric: She has a normal mood and affect  Vitals reviewed        Vital Signs  ED Triage Vitals   Temperature Pulse Respirations Blood Pressure SpO2   10/15/19 0149 10/15/19 0149 10/15/19 0149 10/15/19 0149 10/15/19 0149   (!) 97 2 °F (36 2 °C) 81 18 102/65 100 %      Temp Source Heart Rate Source Patient Position - Orthostatic VS BP Location FiO2 (%)   10/15/19 0149 10/15/19 0149 10/15/19 0149 10/15/19 0149 --   Tympanic Monitor Sitting Left arm       Pain Score       10/15/19 0208       8           Vitals:    10/15/19 0149   BP: 102/65   Pulse: 81   Patient Position - Orthostatic VS: Sitting         Visual Acuity      ED Medications  Medications   ketorolac (TORADOL) injection 15 mg (15 mg Intramuscular Given 10/15/19 0210)   cyclobenzaprine (FLEXERIL) tablet 10 mg (10 mg Oral Given 10/15/19 0209)       Diagnostic Studies  Results Reviewed     Procedure Component Value Units Date/Time    Urine Microscopic [[id number]]  (Abnormal) Collected:  10/15/19 0159    Lab Status:  Final result Specimen:  Urine, Clean Catch Updated:  10/15/19 0209     RBC, UA None Seen /hpf      WBC, UA 2-4 /hpf      Epithelial Cells Moderate /hpf      Bacteria, UA Occasional /hpf      MUCUS THREADS Occasional    UA w Reflex to Microscopic [[id number]]  (Abnormal) Collected:  10/15/19 0159    Lab Status:  Final result Specimen:  Urine, Clean Catch Updated:  10/15/19 0209     Color, UA Yellow     Clarity, UA Clear     Specific Gravity, UA 1 015     pH, UA 7 0     Leukocytes,  0     Nitrite, UA Negative     Protein, UA Negative mg/dl      Glucose, UA Negative mg/dl      Ketones, UA 5 (Trace) mg/dl      Bilirubin, UA Negative     Blood, UA Negative     UROBILINOGEN UA 1 0 mg/dL     POCT pregnancy, urine [[id number]]  (Normal) Resulted:  10/15/19 0159    Lab Status:  Final result Updated:  10/15/19 0159     EXT PREG TEST UR (Ref: Negative) negative     Control valid                 No orders to display              Procedures  Procedures       ED Course                               MDM    Disposition  Final diagnoses:   Acute low back pain   Radiculopathy     Time reflects when diagnosis was documented in both MDM as applicable and the Disposition within this note     Time User Action Codes Description Comment    10/15/2019  2:18 AM Priyank Sewell Add [M54 5] Acute low back pain     10/15/2019  2:19 AM Lynndyl Sewell Add [M54 10] Radiculopathy       ED Disposition     ED Disposition Condition Date/Time Comment    Discharge Stable Tue Oct 15, 2019  2:18 AM [facility] discharge to home/self care              Follow-up Information     Follow up With Specialties Details Why Contact Info    Radha Peterson PA-C Family Medicine, Physician Assistant Schedule an appointment as soon as possible for a visit in 1 week As needed [address]  [address] [place] [address]  [phone]            Discharge Medication List as of 10/15/2019  2:19 AM      START taking these medications    Details   cyclobenzaprine (FLEXERIL) 10 mg tablet Take 1 tablet (10 mg total) by mouth 2 (two) times a day as needed for muscle spasms, Starting Tue 10/15/2019, Print      naproxen (NAPROSYN) 500 mg tablet Take 1 tablet (500 mg total) by mouth 2 (two) times a day with meals, Starting Tue 10/15/2019, Print         CONTINUE these medications which have NOT CHANGED    Details   rizatriptan (MAXALT) 10 MG tablet Take 1 tablet (10 mg total) by mouth once as needed for migraine for up to 1 dose May repeat in 2 hours if needed, Starting u 9/26/2019, Normal      topiramate (TOPAMAX) 25 mg tablet Take 1 tablet had a for the 1st week, then 1 tablet twice a day , Normal           No discharge procedures on file      ED Provider  Electronically Signed by           Dontae Corey DO  10/19/19 5348

## 2019-10-30 ENCOUNTER — HOSPITAL ENCOUNTER (EMERGENCY)
Facility: HOSPITAL | Age: 29
Discharge: HOME | End: 2019-10-30
Attending: EMERGENCY MEDICINE | Admitting: EMERGENCY MEDICINE
Payer: COMMERCIAL

## 2019-10-30 ENCOUNTER — APPOINTMENT (EMERGENCY)
Dept: CT IMAGING | Facility: HOSPITAL | Age: 29
End: 2019-10-30
Payer: COMMERCIAL

## 2019-10-30 VITALS
HEART RATE: 78 BPM | BODY MASS INDEX: 24.57 KG/M2 | OXYGEN SATURATION: 100 % | WEIGHT: 117.06 LBS | RESPIRATION RATE: 20 BRPM | DIASTOLIC BLOOD PRESSURE: 73 MMHG | TEMPERATURE: 98.4 F | HEIGHT: 58 IN | SYSTOLIC BLOOD PRESSURE: 123 MMHG

## 2019-10-30 DIAGNOSIS — K59.00 CONSTIPATION: ICD-10-CM

## 2019-10-30 DIAGNOSIS — N83.209 OVARIAN CYST: ICD-10-CM

## 2019-10-30 DIAGNOSIS — R10.9 ABDOMINAL PAIN: Primary | ICD-10-CM

## 2019-10-30 LAB
ALBUMIN SERPL BCP-MCNC: 4.1 G/DL (ref 3–5.2)
ALP SERPL-CCNC: 81 U/L (ref 43–122)
ALT SERPL W P-5'-P-CCNC: 21 U/L (ref 9–52)
ANION GAP SERPL CALCULATED.3IONS-SCNC: 7 MMOL/L (ref 5–14)
AST SERPL W P-5'-P-CCNC: 27 U/L (ref 14–36)
BASOPHILS # BLD AUTO: 0 THOUSANDS/ΜL (ref 0–0.1)
BASOPHILS NFR BLD AUTO: 1 % (ref 0–1)
BILIRUB SERPL-MCNC: 0.3 MG/DL
BILIRUB UR QL STRIP: NEGATIVE
BUN SERPL-MCNC: 15 MG/DL (ref 5–25)
CALCIUM SERPL-MCNC: 9.3 MG/DL (ref 8.4–10.2)
CHLORIDE SERPL-SCNC: 108 MMOL/L (ref 97–108)
CLARITY UR: CLEAR
CO2 SERPL-SCNC: 24 MMOL/L (ref 22–30)
COLOR UR: YELLOW
CREAT SERPL-MCNC: 0.61 MG/DL (ref 0.6–1.2)
EOSINOPHIL # BLD AUTO: 0.2 THOUSAND/ΜL (ref 0–0.4)
EOSINOPHIL NFR BLD AUTO: 2 % (ref 0–6)
ERYTHROCYTE [DISTWIDTH] IN BLOOD BY AUTOMATED COUNT: 13.5 %
EXT PREG TEST URINE: NORMAL
EXT. CONTROL ED NAV: NORMAL
GFR SERPL CREATININE-BSD FRML MDRD: 124 ML/MIN/1.73SQ M
GLUCOSE SERPL-MCNC: 89 MG/DL (ref 70–99)
GLUCOSE UR STRIP-MCNC: NEGATIVE MG/DL
HCT VFR BLD AUTO: 41 % (ref 36–46)
HGB BLD-MCNC: 14 G/DL (ref 12–16)
HGB UR QL STRIP.AUTO: NEGATIVE
KETONES UR STRIP-MCNC: ABNORMAL MG/DL
LEUKOCYTE ESTERASE UR QL STRIP: NEGATIVE
LIPASE SERPL-CCNC: 72 U/L (ref 23–300)
LYMPHOCYTES # BLD AUTO: 2.8 THOUSANDS/ΜL (ref 0.5–4)
LYMPHOCYTES NFR BLD AUTO: 28 % (ref 25–45)
MCH RBC QN AUTO: 30.4 PG (ref 26–34)
MCHC RBC AUTO-ENTMCNC: 34.1 G/DL (ref 31–36)
MCV RBC AUTO: 89 FL (ref 80–100)
MONOCYTES # BLD AUTO: 0.8 THOUSAND/ΜL (ref 0.2–0.9)
MONOCYTES NFR BLD AUTO: 8 % (ref 1–10)
NEUTROPHILS # BLD AUTO: 6.1 THOUSANDS/ΜL (ref 1.8–7.8)
NEUTS SEG NFR BLD AUTO: 61 % (ref 45–65)
NITRITE UR QL STRIP: NEGATIVE
PH UR STRIP.AUTO: 7 [PH]
PLATELET # BLD AUTO: 210 THOUSANDS/UL (ref 150–450)
PMV BLD AUTO: 9.5 FL (ref 8.9–12.7)
POTASSIUM SERPL-SCNC: 3.8 MMOL/L (ref 3.6–5)
PROT SERPL-MCNC: 7 G/DL (ref 5.9–8.4)
PROT UR STRIP-MCNC: NEGATIVE MG/DL
RBC # BLD AUTO: 4.6 MILLION/UL (ref 4–5.2)
SODIUM SERPL-SCNC: 139 MMOL/L (ref 137–147)
SP GR UR STRIP.AUTO: 1.01 (ref 1–1.04)
UROBILINOGEN UA: NEGATIVE MG/DL
WBC # BLD AUTO: 10 THOUSAND/UL (ref 4.5–11)

## 2019-10-30 PROCEDURE — 99284 EMERGENCY DEPT VISIT MOD MDM: CPT

## 2019-10-30 PROCEDURE — 96361 HYDRATE IV INFUSION ADD-ON: CPT

## 2019-10-30 PROCEDURE — 85025 COMPLETE CBC W/AUTO DIFF WBC: CPT | Performed by: EMERGENCY MEDICINE

## 2019-10-30 PROCEDURE — 81003 URINALYSIS AUTO W/O SCOPE: CPT | Performed by: EMERGENCY MEDICINE

## 2019-10-30 PROCEDURE — 96374 THER/PROPH/DIAG INJ IV PUSH: CPT

## 2019-10-30 PROCEDURE — 74177 CT ABD & PELVIS W/CONTRAST: CPT

## 2019-10-30 PROCEDURE — 83690 ASSAY OF LIPASE: CPT | Performed by: EMERGENCY MEDICINE

## 2019-10-30 PROCEDURE — 36415 COLL VENOUS BLD VENIPUNCTURE: CPT | Performed by: EMERGENCY MEDICINE

## 2019-10-30 PROCEDURE — 99284 EMERGENCY DEPT VISIT MOD MDM: CPT | Performed by: EMERGENCY MEDICINE

## 2019-10-30 PROCEDURE — 81025 URINE PREGNANCY TEST: CPT | Performed by: EMERGENCY MEDICINE

## 2019-10-30 PROCEDURE — 80053 COMPREHEN METABOLIC PANEL: CPT | Performed by: EMERGENCY MEDICINE

## 2019-10-30 PROCEDURE — 96375 TX/PRO/DX INJ NEW DRUG ADDON: CPT

## 2019-10-30 RX ORDER — POLYETHYLENE GLYCOL 3350 17 G/17G
17 POWDER, FOR SOLUTION ORAL DAILY
Qty: 14 EACH | Refills: 0 | Status: SHIPPED | OUTPATIENT
Start: 2019-10-30 | End: 2019-11-13

## 2019-10-30 RX ORDER — MAGNESIUM HYDROXIDE/ALUMINUM HYDROXICE/SIMETHICONE 120; 1200; 1200 MG/30ML; MG/30ML; MG/30ML
30 SUSPENSION ORAL ONCE
Status: COMPLETED | OUTPATIENT
Start: 2019-10-30 | End: 2019-10-30

## 2019-10-30 RX ORDER — LIDOCAINE HYDROCHLORIDE 20 MG/ML
15 SOLUTION OROPHARYNGEAL ONCE
Status: COMPLETED | OUTPATIENT
Start: 2019-10-30 | End: 2019-10-30

## 2019-10-30 RX ORDER — ONDANSETRON 2 MG/ML
4 INJECTION INTRAMUSCULAR; INTRAVENOUS ONCE
Status: COMPLETED | OUTPATIENT
Start: 2019-10-30 | End: 2019-10-30

## 2019-10-30 RX ADMIN — FAMOTIDINE 20 MG: 10 INJECTION, SOLUTION INTRAVENOUS at 00:40

## 2019-10-30 RX ADMIN — SODIUM CHLORIDE 1000 ML: 0.9 INJECTION, SOLUTION INTRAVENOUS at 00:50

## 2019-10-30 RX ADMIN — ONDANSETRON 4 MG: 2 INJECTION INTRAMUSCULAR; INTRAVENOUS at 00:40

## 2019-10-30 RX ADMIN — ALUMINUM HYDROXIDE, MAGNESIUM HYDROXIDE, AND SIMETHICONE 30 ML: 200; 200; 20 SUSPENSION ORAL at 00:40

## 2019-10-30 RX ADMIN — IOHEXOL 80 ML: 350 INJECTION, SOLUTION INTRAVENOUS at 01:09

## 2019-10-30 RX ADMIN — LIDOCAINE HYDROCHLORIDE 15 ML: 20 SOLUTION ORAL; TOPICAL at 00:40

## 2019-10-30 NOTE — ED PROVIDER NOTES
History  Chief Complaint   Patient presents with    Abdominal Pain     20-year-old female presents for evaluation of periumbilical abdominal pain this been ongoing for the last 5 days  Associated nausea without vomiting and also constipation  Last bowel movement was 2 days ago  Pain is intermittent, relieved with naproxen  No past surgical history  LMP was 2 weeks ago  Denies vaginal discharge or urinary symptoms  Denies ever having similar symptoms previously  None       Past Medical History:   Diagnosis Date    Frequent headaches     No known health problems        Past Surgical History:   Procedure Laterality Date    NO PAST SURGERIES         History reviewed  No pertinent family history  I have reviewed and agree with the history as documented  Social History     Tobacco Use    Smoking status: Current Every Day Smoker     Packs/day: 0 25     Types: Cigarettes    Smokeless tobacco: Never Used   Substance Use Topics    Alcohol use: Not Currently    Drug use: No        Review of Systems   Constitutional: Negative for chills, diaphoresis and fever  HENT: Negative for congestion and rhinorrhea  Eyes: Negative for pain and visual disturbance  Respiratory: Negative for cough, shortness of breath and wheezing  Cardiovascular: Negative for chest pain and leg swelling  Gastrointestinal: Positive for abdominal pain, constipation and nausea  Negative for diarrhea and vomiting  Genitourinary: Negative for difficulty urinating, dysuria, frequency, urgency, vaginal bleeding and vaginal discharge  Musculoskeletal: Negative for back pain and neck pain  Skin: Negative for color change and rash  Neurological: Negative for syncope, numbness and headaches  All other systems reviewed and are negative  Physical Exam  Physical Exam   Constitutional: She is oriented to person, place, and time  She appears well-developed and well-nourished     HENT:   Head: Normocephalic and atraumatic  Eyes: Conjunctivae and EOM are normal    Neck: Normal range of motion  Neck supple  Cardiovascular: Normal rate and regular rhythm  Pulmonary/Chest: Effort normal and breath sounds normal  No respiratory distress  She has no wheezes  She has no rales  Abdominal: Soft  Bowel sounds are normal  There is tenderness in the periumbilical area  There is no guarding  Musculoskeletal: Normal range of motion  She exhibits no edema or tenderness  Neurological: She is alert and oriented to person, place, and time  No cranial nerve deficit  Skin: Skin is warm  No erythema  Psychiatric: She has a normal mood and affect  Her behavior is normal    Nursing note and vitals reviewed        Vital Signs  ED Triage Vitals [10/30/19 0012]   Temperature Pulse Respirations Blood Pressure SpO2   98 4 °F (36 9 °C) 87 18 102/73 100 %      Temp Source Heart Rate Source Patient Position - Orthostatic VS BP Location FiO2 (%)   Tympanic Monitor Sitting Left arm --      Pain Score       8           Vitals:    10/30/19 0012 10/30/19 0110   BP: 102/73 123/73   Pulse: 87 78   Patient Position - Orthostatic VS: Sitting Lying         Visual Acuity      ED Medications  Medications   ondansetron (ZOFRAN) injection 4 mg (4 mg Intravenous Given 10/30/19 0040)   aluminum-magnesium hydroxide-simethicone (MYLANTA) 200-200-20 mg/5 mL oral suspension 30 mL (30 mL Oral Given 10/30/19 0040)   Lidocaine Viscous HCl (XYLOCAINE) 2 % mucosal solution 15 mL (15 mL Swish & Spit Given 10/30/19 0040)   famotidine (PEPCID) injection 20 mg (20 mg Intravenous Given 10/30/19 0040)   sodium chloride 0 9 % bolus 1,000 mL (0 mL Intravenous Stopped 10/30/19 0144)   iohexol (OMNIPAQUE) 350 MG/ML injection (MULTI-DOSE) 80 mL (80 mL Intravenous Given 10/30/19 0109)       Diagnostic Studies  Results Reviewed     Procedure Component Value Units Date/Time    Lipase [[id number]]  (Normal) Collected:  10/30/19 0030    Lab Status:  Final result Specimen:  Blood from Arm, Left Updated:  10/30/19 0049     Lipase 72 u/L     Comprehensive metabolic panel [[id number]]  (Normal) Collected:  10/30/19 0030    Lab Status:  Final result Specimen:  Blood from Arm, Left Updated:  10/30/19 0049     Sodium 139 mmol/L      Potassium 3 8 mmol/L      Chloride 108 mmol/L      CO2 24 mmol/L      ANION GAP 7 mmol/L      BUN 15 mg/dL      Creatinine 0 61 mg/dL      Glucose 89 mg/dL      Calcium 9 3 mg/dL      AST 27 U/L      ALT 21 U/L      Alkaline Phosphatase 81 U/L      Total Protein 7 0 g/dL      Albumin 4 1 g/dL      Total Bilirubin 0 30 mg/dL      eGFR 124 ml/min/1 73sq m     Narrative:       National Kidney Disease Foundation guidelines for Chronic Kidney Disease (CKD):     Stage 1 with normal or high GFR (GFR > 90 mL/min/1 73 square meters)    Stage 2 Mild CKD (GFR = 60-89 mL/min/1 73 square meters)    Stage 3A Moderate CKD (GFR = 45-59 mL/min/1 73 square meters)    Stage 3B Moderate CKD (GFR = 30-44 mL/min/1 73 square meters)    Stage 4 Severe CKD (GFR = 15-29 mL/min/1 73 square meters)    Stage 5 End Stage CKD (GFR <15 mL/min/1 73 square meters)  Note: GFR calculation is accurate only with a steady state creatinine    CBC and differential [[id number]]  (Normal) Collected:  10/30/19 0030    Lab Status:  Final result Specimen:  Blood from Arm, Left Updated:  10/30/19 0042     WBC 10 00 Thousand/uL      RBC 4 60 Million/uL      Hemoglobin 14 0 g/dL      Hematocrit 41 0 %      MCV 89 fL      MCH 30 4 pg      MCHC 34 1 g/dL      RDW 13 5 %      MPV 9 5 fL      Platelets 265 Thousands/uL      Neutrophils Relative 61 %      Lymphocytes Relative 28 %      Monocytes Relative 8 %      Eosinophils Relative 2 %      Basophils Relative 1 %      Neutrophils Absolute 6 10 Thousands/µL      Lymphocytes Absolute 2 80 Thousands/µL      Monocytes Absolute 0 80 Thousand/µL      Eosinophils Absolute 0 20 Thousand/µL      Basophils Absolute 0 00 Thousands/µL     UA w Reflex to Microscopic w Reflex to Culture [[id number]]  (Abnormal) Collected:  10/30/19 0031    Lab Status:  Final result Specimen:  Urine, Clean Catch Updated:  10/30/19 0042     Color, UA Yellow     Clarity, UA Clear     Specific Gravity, UA 1 015     pH, UA 7 0     Leukocytes, UA Negative     Nitrite, UA Negative     Protein, UA Negative mg/dl      Glucose, UA Negative mg/dl      Ketones, UA 50 (2+) mg/dl      Bilirubin, UA Negative     Blood, UA Negative     UROBILINOGEN UA Negative mg/dL     POCT pregnancy, urine [[id number]]  (Normal) Resulted:  10/30/19 0028    Lab Status:  Final result Specimen:  Urine Updated:  10/30/19 0028     EXT PREG TEST UR (Ref: Negative) Negative  Control Valid  CT abdomen pelvis with contrast   Final Result by Annette Morin MD (10/30 0130)      1  Elongated 3 9 x 2 0 x 3 1 cm left adnexal cyst with a small amount of pelvic free fluid which may be due to a ruptured ovarian cyst    2   Stool-filled cecum, ascending colon and transverse colon, correlate for constipation  Workstation performed: [id number]                    Procedures  Procedures       ED Course                               MDM  Number of Diagnoses or Management Options  Diagnosis management comments: 70-year-old female presenting with periumbilical abdominal pain  Obtain labs, urinalysis, pregnancy test, CT abdomen pelvis  Symptom control and reassess        Disposition  Final diagnoses:   Abdominal pain   Ovarian cyst   Constipation     Time reflects when diagnosis was documented in both MDM as applicable and the Disposition within this note     Time User Action Codes Description Comment    10/30/2019  1:38 AM Anthonette  Add [R10 9] Abdominal pain     10/30/2019  1:38 AM Anthonette  Add [V17 031] Ovarian cyst     10/30/2019  1:38 AM Anthonette  Add [K59 00] Constipation       ED Disposition     ED Disposition Condition Date/Time Comment    Discharge Stable Wed Oct 30, 2019  1:38 AM Renita Claudio discharge to home/self care  Follow-up Information     Follow up With Specialties Details Why Contact Info    Candy Hooks PA-C Family Medicine, Physician Assistant In 1 week  [address]  [address] [facility]  [address]  [address] [facility]  If symptoms worsen [address] [address] [phone]          Discharge Medication List as of 10/30/2019  1:38 AM      START taking these medications    Details   polyethylene glycol (MIRALAX) 17 g packet Take 17 g by mouth daily, Starting Wed 10/30/2019, Normal           No discharge procedures on file      ED Provider  Electronically Signed by           Dorothy Farrell,   10/30/19 020

## 2019-10-31 ENCOUNTER — OFFICE VISIT (OUTPATIENT)
Dept: FAMILY MEDICINE CLINIC | Facility: CLINIC | Age: 29
End: 2019-10-31

## 2019-10-31 VITALS
TEMPERATURE: 97.1 F | BODY MASS INDEX: 24.04 KG/M2 | RESPIRATION RATE: 18 BRPM | HEART RATE: 81 BPM | SYSTOLIC BLOOD PRESSURE: 100 MMHG | WEIGHT: 115 LBS | DIASTOLIC BLOOD PRESSURE: 70 MMHG | OXYGEN SATURATION: 99 %

## 2019-10-31 DIAGNOSIS — K59.00 CONSTIPATION, UNSPECIFIED CONSTIPATION TYPE: Primary | ICD-10-CM

## 2019-10-31 PROCEDURE — 99213 OFFICE O/P EST LOW 20 MIN: CPT | Performed by: FAMILY MEDICINE

## 2019-10-31 RX ORDER — LACTULOSE 20 G/30ML
10 SOLUTION ORAL DAILY PRN
Qty: 1 BOTTLE | Refills: 0 | Status: SHIPPED | OUTPATIENT
Start: 2019-10-31 | End: 2020-08-22 | Stop reason: ALTCHOICE

## 2019-10-31 RX ORDER — SENNA AND DOCUSATE SODIUM 50; 8.6 MG/1; MG/1
1 TABLET, FILM COATED ORAL DAILY PRN
Qty: 15 TABLET | Refills: 0 | Status: SHIPPED | OUTPATIENT
Start: 2019-10-31 | End: 2019-11-13

## 2019-10-31 NOTE — ASSESSMENT & PLAN NOTE
- Per d/w patient and partner in the room, GORGE was deferred  Patient prefers to try medication change today prior to exam for impaction/ stool burden in vault  - Hold miralax today  Take lactulose (15ml) 10 g this morning  If no BM during the day take another 10 g tonight  - If unsatisfactory BM with the lactulose take senokot S Qd prn starting tomorrow  - Increase fiber intake  Stay well hydrated and physically active  - Reviewed ER precautions   - If she has a satisfactory BM she may use miralax/ senokot S Qd prn  - F/u with PCP in 1 - 2 weeks to assess status  - If this issue persists in future f/u would consider possibility of IBS - constipation predominant

## 2019-10-31 NOTE — PROGRESS NOTES
Assessment/Plan:    Constipation  - Per d/w patient and partner in the room, GORGE was deferred  Patient prefers to try medication change today prior to exam for impaction/ stool burden in vault  - Hold miralax today  Take lactulose (15ml) 10 g this morning  If no BM during the day take another 10 g tonight  - If unsatisfactory BM with the lactulose take senokot S Qd prn starting tomorrow  - Increase fiber intake  Stay well hydrated and physically active  - Reviewed ER precautions   - If she has a satisfactory BM she may use miralax/ senokot S Qd prn  - F/u with PCP in 1 - 2 weeks to assess status  - If this issue persists in future f/u would consider possibility of IBS - constipation predominant  Diagnoses and all orders for this visit:    Constipation, unspecified constipation type  -     lactulose 20 g/30 mL; Take 15 mL (10 g total) by mouth daily as needed (constipation) Titrate as directed  -     senna-docusate sodium (SENOKOT-S) 8 6-50 mg per tablet; Take 1 tablet by mouth daily as needed for constipation          Subjective:      Patient ID: Beverly Blanchard is a 30 y o  female  30 y o female presents with c/o constipation    H/O constipation x 2 months, BM once every week approx, has straining at stools and hard stools  Feels her bowel habits were satisfactory 2 months prior  Denies any drug use/ OTC anticholinergic/ change in diet  No H/O diarrhea in past      H/O enema use at home x OTC, last used Saturday, did not help  Has used miralax in past and did not find it helpful  Was given miralax in ER and tried 17g yesterday with no results  Tried an OTC laxative  Unsure of name and that helped her have a small BM this morning but she still feels "backed up"  Denies any nausea/ vomiting/ H/O hernia/ past abd surgeries  Denies any fever/ BPR         The following portions of the patient's history were reviewed and updated as appropriate: She  has a past medical history of Frequent headaches and No known health problems  Patient Active Problem List    Diagnosis Date Noted    Constipation 10/31/2019    Migraine without aura and without status migrainosus, not intractable 09/26/2019     She  has a past surgical history that includes No past surgeries  Her family history is not on file  She  reports that she has been smoking cigarettes  She has been smoking about 0 25 packs per day  She has never used smokeless tobacco  She reports that she drank alcohol  She reports that she does not use drugs  Current Outpatient Medications   Medication Sig Dispense Refill    lactulose 20 g/30 mL Take 15 mL (10 g total) by mouth daily as needed (constipation) Titrate as directed  1 Bottle 0    polyethylene glycol (MIRALAX) 17 g packet Take 17 g by mouth daily 14 each 0    senna-docusate sodium (SENOKOT-S) 8 6-50 mg per tablet Take 1 tablet by mouth daily as needed for constipation 15 tablet 0     No current facility-administered medications for this visit  Current Outpatient Medications on File Prior to Visit   Medication Sig    polyethylene glycol (MIRALAX) 17 g packet Take 17 g by mouth daily     No current facility-administered medications on file prior to visit  She is allergic to apple       Review of Systems   Constitutional: Negative for chills and fever  Respiratory: Negative for cough, shortness of breath and wheezing  Cardiovascular: Negative for chest pain, palpitations and leg swelling  Gastrointestinal: Positive for abdominal pain (on and off ) and constipation  Negative for blood in stool, diarrhea, nausea and vomiting  Genitourinary: Negative for dysuria and hematuria  Musculoskeletal: Negative for arthralgias  Neurological: Negative for headaches           Objective:      /70   Pulse 81   Temp (!) 97 1 °F (36 2 °C) (Temporal)   Resp 18   Wt 52 2 kg (115 lb)   LMP 10/17/2019 (Exact Date)   SpO2 99%   BMI 24 04 kg/m²          Physical Exam Constitutional: She is oriented to person, place, and time  She appears well-developed and well-nourished  No distress  HENT:   Head: Normocephalic and atraumatic  Mouth/Throat: Oropharynx is clear and moist  No oropharyngeal exudate  Eyes: Pupils are equal, round, and reactive to light  Conjunctivae and EOM are normal  Right eye exhibits no discharge  Left eye exhibits no discharge  No scleral icterus  Neck: Normal range of motion  Cardiovascular: Normal rate, regular rhythm and normal heart sounds  Exam reveals no gallop and no friction rub  No murmur heard  Pulmonary/Chest: Effort normal and breath sounds normal  No stridor  No respiratory distress  She has no wheezes  She has no rales  Abdominal: Soft  Bowel sounds are normal  She exhibits no distension  There is no tenderness  There is no rebound and no guarding  No hernia  Musculoskeletal: She exhibits no edema or tenderness  Lymphadenopathy:     She has no cervical adenopathy  Neurological: She is alert and oriented to person, place, and time  Skin: Skin is warm  She is not diaphoretic  Vitals reviewed

## 2019-11-13 ENCOUNTER — HOSPITAL ENCOUNTER (EMERGENCY)
Facility: HOSPITAL | Age: 29
Discharge: HOME | End: 2019-11-13
Attending: EMERGENCY MEDICINE | Admitting: EMERGENCY MEDICINE
Payer: COMMERCIAL

## 2019-11-13 VITALS
BODY MASS INDEX: 24.48 KG/M2 | HEART RATE: 70 BPM | OXYGEN SATURATION: 100 % | SYSTOLIC BLOOD PRESSURE: 118 MMHG | TEMPERATURE: 98.9 F | HEIGHT: 58 IN | WEIGHT: 116.62 LBS | DIASTOLIC BLOOD PRESSURE: 62 MMHG | RESPIRATION RATE: 16 BRPM

## 2019-11-13 DIAGNOSIS — N39.0 UTI (URINARY TRACT INFECTION): ICD-10-CM

## 2019-11-13 DIAGNOSIS — B34.9 VIRAL SYNDROME: Primary | ICD-10-CM

## 2019-11-13 LAB
BACTERIA UR QL AUTO: ABNORMAL /HPF
BILIRUB UR QL STRIP: NEGATIVE
CLARITY UR: CLEAR
COLOR UR: YELLOW
EXT PREG TEST URINE: NEGATIVE
EXT. CONTROL ED NAV: NORMAL
GLUCOSE UR STRIP-MCNC: NEGATIVE MG/DL
HGB UR QL STRIP.AUTO: 10
KETONES UR STRIP-MCNC: NEGATIVE MG/DL
LEUKOCYTE ESTERASE UR QL STRIP: NEGATIVE
MUCOUS THREADS UR QL AUTO: ABNORMAL
NITRITE UR QL STRIP: NEGATIVE
NON-SQ EPI CELLS URNS QL MICRO: ABNORMAL /HPF
PH UR STRIP.AUTO: 5 [PH]
PROT UR STRIP-MCNC: NEGATIVE MG/DL
RBC #/AREA URNS AUTO: ABNORMAL /HPF
SP GR UR STRIP.AUTO: 1.02 (ref 1–1.04)
UROBILINOGEN UA: NEGATIVE MG/DL
WBC #/AREA URNS AUTO: ABNORMAL /HPF

## 2019-11-13 PROCEDURE — 81001 URINALYSIS AUTO W/SCOPE: CPT | Performed by: PHYSICIAN ASSISTANT

## 2019-11-13 PROCEDURE — 81025 URINE PREGNANCY TEST: CPT | Performed by: PHYSICIAN ASSISTANT

## 2019-11-13 PROCEDURE — 99283 EMERGENCY DEPT VISIT LOW MDM: CPT

## 2019-11-13 PROCEDURE — 81003 URINALYSIS AUTO W/O SCOPE: CPT | Performed by: PHYSICIAN ASSISTANT

## 2019-11-13 PROCEDURE — 99284 EMERGENCY DEPT VISIT MOD MDM: CPT | Performed by: PHYSICIAN ASSISTANT

## 2019-11-13 RX ORDER — NORGESTIMATE AND ETHINYL ESTRADIOL 0.25-0.035
1 KIT ORAL DAILY
COMMUNITY
Start: 2019-11-13 | End: 2020-08-22 | Stop reason: ALTCHOICE

## 2019-11-13 RX ORDER — CEPHALEXIN 500 MG/1
500 CAPSULE ORAL EVERY 6 HOURS SCHEDULED
Qty: 28 CAPSULE | Refills: 0 | Status: SHIPPED | OUTPATIENT
Start: 2019-11-13 | End: 2019-11-20

## 2019-11-13 RX ORDER — IBUPROFEN 600 MG/1
600 TABLET ORAL EVERY 6 HOURS PRN
Qty: 30 TABLET | Refills: 0 | Status: SHIPPED | OUTPATIENT
Start: 2019-11-13 | End: 2020-08-22 | Stop reason: ALTCHOICE

## 2019-11-13 RX ORDER — DIPHENHYDRAMINE HCL 25 MG
25 TABLET ORAL ONCE
Status: COMPLETED | OUTPATIENT
Start: 2019-11-13 | End: 2019-11-13

## 2019-11-13 RX ORDER — PSEUDOEPHEDRINE HYDROCHLORIDE 30 MG/1
60 TABLET ORAL ONCE
Status: COMPLETED | OUTPATIENT
Start: 2019-11-13 | End: 2019-11-13

## 2019-11-13 RX ORDER — PSEUDOEPHEDRINE HCL 60 MG/1
30 TABLET ORAL EVERY 4 HOURS PRN
Qty: 30 TABLET | Refills: 0 | Status: SHIPPED | OUTPATIENT
Start: 2019-11-13 | End: 2020-08-22 | Stop reason: ALTCHOICE

## 2019-11-13 RX ORDER — IBUPROFEN 400 MG/1
800 TABLET ORAL ONCE
Status: COMPLETED | OUTPATIENT
Start: 2019-11-13 | End: 2019-11-13

## 2019-11-13 RX ADMIN — IBUPROFEN 800 MG: 400 TABLET ORAL at 10:01

## 2019-11-13 RX ADMIN — DIPHENHYDRAMINE HCL 25 MG: 25 TABLET ORAL at 10:01

## 2019-11-13 RX ADMIN — PSEUDOEPHEDRINE HCL 60 MG: 30 TABLET, FILM COATED ORAL at 10:01

## 2019-11-14 NOTE — ED PROVIDER NOTES
History  Chief Complaint   Patient presents with    Pain     Generalized body aches since yesterday; trouble sleeping due to pain     This is a 26-year-old female with medical history migraines who presents today for evaluation of generalized body aches, rhinorrhea, chills, and a mild cough that started yesterday  Patient reports an associated headache  She reports no blurred vision or double vision  She reports headache is frontal   She states that she has not had any fevers  She reports she took something yesterday for the pain but she is not sure what  She denies any significant dysuria or hematuria  She denies any belly pain or nausea  She denies any vomiting  She reports she is not taking anything for pain today  She denies any decreased strength  She denies any fevers  She denies any neck pain  Prior to Admission Medications   Prescriptions Last Dose Informant Patient Reported? Taking?   lactulose 20 g/30 mL Past Week at Unknown time  No Yes   Sig: Take 15 mL (10 g total) by mouth daily as needed (constipation) Titrate as directed  norgestimate-ethinyl estradiol (ORTHO-CYCLEN) 0 25-35 MG-MCG per tablet   Yes Yes   Sig: Take 1 tablet by mouth daily      Facility-Administered Medications: None       Past Medical History:   Diagnosis Date    Frequent headaches     No known health problems        Past Surgical History:   Procedure Laterality Date    NO PAST SURGERIES         History reviewed  No pertinent family history  I have reviewed and agree with the history as documented  Social History     Tobacco Use    Smoking status: Current Every Day Smoker     Packs/day: 0 25     Types: Cigarettes    Smokeless tobacco: Never Used   Substance Use Topics    Alcohol use: Not Currently    Drug use: No        Review of Systems   Constitutional: Positive for chills  Negative for fever  HENT: Positive for congestion, postnasal drip, rhinorrhea, sinus pressure and sore throat      Eyes: Negative  Respiratory: Negative  Cardiovascular: Negative  Gastrointestinal: Negative  Endocrine: Negative  Genitourinary: Negative  Musculoskeletal: Negative  Skin: Negative  Allergic/Immunologic: Negative  Neurological: Positive for headaches  Negative for dizziness and weakness  Hematological: Negative  Psychiatric/Behavioral: Negative  Physical Exam  Physical Exam   Constitutional: She is oriented to person, place, and time  She appears well-developed and well-nourished  HENT:   Head: Normocephalic and atraumatic  Right Ear: External ear normal    Left Ear: External ear normal    Nose: Nose normal    Mouth/Throat: Oropharynx is clear and moist  No oropharyngeal exudate  Noted rhinorrhea on exam   Postnasal drip present  Eyes: Pupils are equal, round, and reactive to light  Conjunctivae and EOM are normal  Right eye exhibits no discharge  Left eye exhibits no discharge  Neck: Normal range of motion  Negative Kernig's and Brudzinski sign  Cardiovascular: Normal rate, regular rhythm and normal heart sounds  No murmur heard  Pulmonary/Chest: Effort normal and breath sounds normal    Abdominal: Soft  She exhibits no distension  There is no tenderness  Neurological: She is alert and oriented to person, place, and time  No cranial nerve deficit  She displays a negative Romberg sign  GCS eye subscore is 4  GCS verbal subscore is 5  GCS motor subscore is 6  Skin: Skin is warm  Capillary refill takes less than 2 seconds  Psychiatric: She has a normal mood and affect   Her behavior is normal  Judgment and thought content normal        Vital Signs  ED Triage Vitals [11/13/19 0930]   Temperature Pulse Respirations Blood Pressure SpO2   (!) 96 °F (35 6 °C) 82 16 112/66 100 %      Temp Source Heart Rate Source Patient Position - Orthostatic VS BP Location FiO2 (%)   Tympanic Monitor Sitting Left arm --      Pain Score       9           Vitals:    11/13/19 0930 11/13/19 1055   BP: 112/66 118/62   Pulse: 82 70   Patient Position - Orthostatic VS: Sitting Lying         Visual Acuity      ED Medications  Medications   pseudoephedrine (SUDAFED) tablet 60 mg (60 mg Oral Given 11/13/19 1001)   ibuprofen (MOTRIN) tablet 800 mg (800 mg Oral Given 11/13/19 1001)   diphenhydrAMINE (BENADRYL) tablet 25 mg (25 mg Oral Given 11/13/19 1001)       Diagnostic Studies  Results Reviewed     Procedure Component Value Units Date/Time    Urine Microscopic [[id number]]  (Abnormal) Collected:  11/13/19 0957    Lab Status:  Final result Specimen:  Urine, Clean Catch Updated:  11/13/19 1023     RBC, UA 0-1 /hpf      WBC, UA 0-1 /hpf      Epithelial Cells Moderate /hpf      Bacteria, UA Occasional /hpf      MUCUS THREADS Occasional    UA w Reflex to Microscopic w Reflex to Culture [[id number]]  (Abnormal) Collected:  11/13/19 0957    Lab Status:  Final result Specimen:  Urine, Clean Catch Updated:  11/13/19 1010     Color, UA Yellow     Clarity, UA Clear     Specific [place], UA 1 020     pH, UA 5 0     Leukocytes, UA Negative     Nitrite, UA Negative     Protein, UA Negative mg/dl      Glucose, UA Negative mg/dl      Ketones, UA Negative mg/dl      Bilirubin, UA Negative     Blood, UA 10 0     UROBILINOGEN UA Negative mg/dL     POCT pregnancy, urine [[id number]]  (Normal) Resulted:  11/13/19 0959    Lab Status:  Final result Updated:  11/13/19 0959     EXT PREG TEST UR (Ref: Negative) Negative     Control Valid                 No orders to display              Procedures  Procedures       ED Course  ED Course as of Nov 14 1035   Wed Nov 13, 2019   1030 No ketones suggestive of any acute abnormality  UA w Reflex to Microscopic w Reflex to Culture(!)   1122 Patient reports complete resolution of pain with administration of medications  She has no CVA tenderness  DC home stable                                    MDM  Number of Diagnoses or Management Options  UTI (urinary tract infection):   Viral syndrome:   Diagnosis management comments: This is a 81-year-old female presents today with multiple complaints  Patient has no gross focal deficits or abnormalities on exam   She was given Sudafed, Benadryl, and ibuprofen in the emergency department  She is able to tolerate p o  Fluids  She reports significant pain improvement and resolution of headache with administration of medications  She was treated for UTI given her UA findings  Patient was discharged home stable  Reviewed strict indications on if and when to return to the emergency department with the patient  Disposition  Final diagnoses:   Viral syndrome   UTI (urinary tract infection)     Time reflects when diagnosis was documented in both MDM as applicable and the Disposition within this note     Time User Action Codes Description Comment    11/13/2019 11:23 AM Sangeeta PRIETO Add [B34 9] Viral syndrome     11/13/2019 11:23 AM Sangeeta PRIETO Add [N39 0] UTI (urinary tract infection)       ED Disposition     ED Disposition Condition Date/Time Comment    Discharge Stable Wed Nov 13, 2019 11:23 AM [address] discharge to home/self care              Follow-up Information    None         Discharge Medication List as of 11/13/2019 11:24 AM      START taking these medications    Details   cephalexin (KEFLEX) 500 mg capsule Take 1 capsule (500 mg total) by mouth every 6 (six) hours for 7 days, Starting Wed 11/13/2019, Until Wed 11/20/2019, Print      ibuprofen (MOTRIN) 600 mg tablet Take 1 tablet (600 mg total) by mouth every 6 (six) hours as needed for moderate pain, Starting Wed 11/13/2019, Print      pseudoephedrine (SUDAFED) 60 mg tablet Take 0 5 tablets (30 mg total) by mouth every 4 (four) hours as needed for congestion, Starting Wed 11/13/2019, Print         CONTINUE these medications which have NOT CHANGED    Details   lactulose 20 g/30 mL Take 15 mL (10 g total) by mouth daily as needed (constipation) Titrate as directed , Starting Thu 10/31/2019, Normal      norgestimate-ethinyl estradiol (ORTHO-CYCLEN) 0 25-35 MG-MCG per tablet Take 1 tablet by mouth daily, Starting Wed 11/13/2019, Historical Med           No discharge procedures on file      ED Provider  Electronically Signed by           Benito Tsai PA-C  11/14/19 8326

## 2019-11-20 ENCOUNTER — TELEPHONE (OUTPATIENT)
Dept: FAMILY MEDICINE CLINIC | Facility: CLINIC | Age: 29
End: 2019-11-20

## 2019-11-20 ENCOUNTER — APPOINTMENT (OUTPATIENT)
Dept: LAB | Facility: CLINIC | Age: 29
End: 2019-11-20
Payer: COMMERCIAL

## 2019-11-20 DIAGNOSIS — G43.009 MIGRAINE WITHOUT AURA AND WITHOUT STATUS MIGRAINOSUS, NOT INTRACTABLE: ICD-10-CM

## 2019-11-20 DIAGNOSIS — Z13.220 SCREENING CHOLESTEROL LEVEL: ICD-10-CM

## 2019-11-20 LAB
ALBUMIN SERPL BCP-MCNC: 4 G/DL (ref 3.5–5)
ALP SERPL-CCNC: 65 U/L (ref 46–116)
ALT SERPL W P-5'-P-CCNC: 21 U/L (ref 12–78)
ANION GAP SERPL CALCULATED.3IONS-SCNC: 4 MMOL/L (ref 4–13)
AST SERPL W P-5'-P-CCNC: 17 U/L (ref 5–45)
BASOPHILS # BLD AUTO: 0.03 THOUSANDS/ΜL (ref 0–0.1)
BASOPHILS NFR BLD AUTO: 0 % (ref 0–1)
BILIRUB SERPL-MCNC: 0.23 MG/DL (ref 0.2–1)
BUN SERPL-MCNC: 16 MG/DL (ref 5–25)
CALCIUM SERPL-MCNC: 9.1 MG/DL (ref 8.3–10.1)
CHLORIDE SERPL-SCNC: 112 MMOL/L (ref 100–108)
CHOLEST SERPL-MCNC: 162 MG/DL (ref 50–200)
CO2 SERPL-SCNC: 24 MMOL/L (ref 21–32)
CREAT SERPL-MCNC: 0.81 MG/DL (ref 0.6–1.3)
CRP SERPL QL: <3 MG/L
EOSINOPHIL # BLD AUTO: 0.16 THOUSAND/ΜL (ref 0–0.61)
EOSINOPHIL NFR BLD AUTO: 2 % (ref 0–6)
ERYTHROCYTE [DISTWIDTH] IN BLOOD BY AUTOMATED COUNT: 12.5 % (ref 11.6–15.1)
FOLATE SERPL-MCNC: 13.3 NG/ML (ref 3.1–17.5)
GFR SERPL CREATININE-BSD FRML MDRD: 99 ML/MIN/1.73SQ M
GLUCOSE P FAST SERPL-MCNC: 89 MG/DL (ref 65–99)
HCT VFR BLD AUTO: 40.6 % (ref 34.8–46.1)
HDLC SERPL-MCNC: 53 MG/DL
HGB BLD-MCNC: 13.2 G/DL (ref 11.5–15.4)
IMM GRANULOCYTES # BLD AUTO: 0.02 THOUSAND/UL (ref 0–0.2)
IMM GRANULOCYTES NFR BLD AUTO: 0 % (ref 0–2)
LDLC SERPL CALC-MCNC: 92 MG/DL (ref 0–100)
LYMPHOCYTES # BLD AUTO: 2.21 THOUSANDS/ΜL (ref 0.6–4.47)
LYMPHOCYTES NFR BLD AUTO: 30 % (ref 14–44)
MAGNESIUM SERPL-MCNC: 2.3 MG/DL (ref 1.6–2.6)
MCH RBC QN AUTO: 29.3 PG (ref 26.8–34.3)
MCHC RBC AUTO-ENTMCNC: 32.5 G/DL (ref 31.4–37.4)
MCV RBC AUTO: 90 FL (ref 82–98)
MONOCYTES # BLD AUTO: 0.66 THOUSAND/ΜL (ref 0.17–1.22)
MONOCYTES NFR BLD AUTO: 9 % (ref 4–12)
NEUTROPHILS # BLD AUTO: 4.22 THOUSANDS/ΜL (ref 1.85–7.62)
NEUTS SEG NFR BLD AUTO: 59 % (ref 43–75)
NONHDLC SERPL-MCNC: 109 MG/DL
NRBC BLD AUTO-RTO: 0 /100 WBCS
PLATELET # BLD AUTO: 222 THOUSANDS/UL (ref 149–390)
PMV BLD AUTO: 11.5 FL (ref 8.9–12.7)
POTASSIUM SERPL-SCNC: 4 MMOL/L (ref 3.5–5.3)
PROT SERPL-MCNC: 7 G/DL (ref 6.4–8.2)
RBC # BLD AUTO: 4.5 MILLION/UL (ref 3.81–5.12)
SODIUM SERPL-SCNC: 140 MMOL/L (ref 136–145)
TRIGL SERPL-MCNC: 87 MG/DL
VIT B12 SERPL-MCNC: 711 PG/ML (ref 100–900)
WBC # BLD AUTO: 7.3 THOUSAND/UL (ref 4.31–10.16)

## 2019-11-20 PROCEDURE — 80061 LIPID PANEL: CPT

## 2019-11-20 PROCEDURE — 80053 COMPREHEN METABOLIC PANEL: CPT

## 2019-11-20 PROCEDURE — 85025 COMPLETE CBC W/AUTO DIFF WBC: CPT

## 2019-11-20 PROCEDURE — 86617 LYME DISEASE ANTIBODY: CPT

## 2019-11-20 PROCEDURE — 36415 COLL VENOUS BLD VENIPUNCTURE: CPT

## 2019-11-20 PROCEDURE — 82746 ASSAY OF FOLIC ACID SERUM: CPT

## 2019-11-20 PROCEDURE — 86618 LYME DISEASE ANTIBODY: CPT

## 2019-11-20 PROCEDURE — 82607 VITAMIN B-12: CPT

## 2019-11-20 PROCEDURE — 83735 ASSAY OF MAGNESIUM: CPT

## 2019-11-20 PROCEDURE — 86140 C-REACTIVE PROTEIN: CPT

## 2019-11-21 ENCOUNTER — OFFICE VISIT (OUTPATIENT)
Dept: FAMILY MEDICINE CLINIC | Facility: CLINIC | Age: 29
End: 2019-11-21

## 2019-11-21 ENCOUNTER — HOSPITAL ENCOUNTER (EMERGENCY)
Facility: HOSPITAL | Age: 29
Discharge: HOME | End: 2019-11-21
Attending: EMERGENCY MEDICINE
Payer: COMMERCIAL

## 2019-11-21 ENCOUNTER — APPOINTMENT (EMERGENCY)
Dept: RADIOLOGY | Facility: HOSPITAL | Age: 29
End: 2019-11-21
Payer: COMMERCIAL

## 2019-11-21 VITALS
WEIGHT: 116 LBS | SYSTOLIC BLOOD PRESSURE: 112 MMHG | HEART RATE: 88 BPM | RESPIRATION RATE: 16 BRPM | TEMPERATURE: 97 F | DIASTOLIC BLOOD PRESSURE: 82 MMHG | OXYGEN SATURATION: 96 % | BODY MASS INDEX: 24.24 KG/M2

## 2019-11-21 VITALS
SYSTOLIC BLOOD PRESSURE: 102 MMHG | DIASTOLIC BLOOD PRESSURE: 60 MMHG | RESPIRATION RATE: 18 BRPM | BODY MASS INDEX: 23.93 KG/M2 | OXYGEN SATURATION: 99 % | HEIGHT: 58 IN | WEIGHT: 114 LBS | TEMPERATURE: 98.2 F | HEART RATE: 95 BPM

## 2019-11-21 DIAGNOSIS — G43.009 MIGRAINE WITHOUT AURA AND WITHOUT STATUS MIGRAINOSUS, NOT INTRACTABLE: Primary | ICD-10-CM

## 2019-11-21 DIAGNOSIS — K59.00 CONSTIPATION, UNSPECIFIED CONSTIPATION TYPE: Primary | ICD-10-CM

## 2019-11-21 DIAGNOSIS — Z23 ENCOUNTER FOR IMMUNIZATION: ICD-10-CM

## 2019-11-21 DIAGNOSIS — K59.00 CONSTIPATION, UNSPECIFIED CONSTIPATION TYPE: ICD-10-CM

## 2019-11-21 DIAGNOSIS — R10.9 ABDOMINAL PAIN: ICD-10-CM

## 2019-11-21 LAB
EXT PREG TEST URINE: NEGATIVE
EXT. CONTROL ED NAV: NORMAL

## 2019-11-21 PROCEDURE — 99213 OFFICE O/P EST LOW 20 MIN: CPT | Performed by: PHYSICIAN ASSISTANT

## 2019-11-21 PROCEDURE — 90471 IMMUNIZATION ADMIN: CPT | Performed by: PHYSICIAN ASSISTANT

## 2019-11-21 PROCEDURE — 81025 URINE PREGNANCY TEST: CPT | Performed by: EMERGENCY MEDICINE

## 2019-11-21 PROCEDURE — 99284 EMERGENCY DEPT VISIT MOD MDM: CPT | Performed by: EMERGENCY MEDICINE

## 2019-11-21 PROCEDURE — 74022 RADEX COMPL AQT ABD SERIES: CPT

## 2019-11-21 PROCEDURE — 3008F BODY MASS INDEX DOCD: CPT | Performed by: PHYSICIAN ASSISTANT

## 2019-11-21 PROCEDURE — 99283 EMERGENCY DEPT VISIT LOW MDM: CPT

## 2019-11-21 PROCEDURE — 90686 IIV4 VACC NO PRSV 0.5 ML IM: CPT | Performed by: PHYSICIAN ASSISTANT

## 2019-11-21 RX ORDER — SUMATRIPTAN 100 MG/1
100 TABLET, FILM COATED ORAL ONCE AS NEEDED
Qty: 9 TABLET | Refills: 2 | Status: SHIPPED | OUTPATIENT
Start: 2019-11-21 | End: 2019-11-21 | Stop reason: SDUPTHER

## 2019-11-21 RX ORDER — LUBIPROSTONE 8 UG/1
8 CAPSULE, GELATIN COATED ORAL 2 TIMES DAILY WITH MEALS
Qty: 180 CAPSULE | Refills: 0 | Status: SHIPPED | OUTPATIENT
Start: 2019-11-21 | End: 2020-08-22 | Stop reason: ALTCHOICE

## 2019-11-21 RX ORDER — CALCIUM CARBONATE 300MG(750)
1 TABLET,CHEWABLE ORAL DAILY
Qty: 90 TABLET | Refills: 0 | Status: SHIPPED | OUTPATIENT
Start: 2019-11-21 | End: 2020-08-22 | Stop reason: ALTCHOICE

## 2019-11-21 RX ORDER — SUMATRIPTAN 100 MG/1
100 TABLET, FILM COATED ORAL ONCE AS NEEDED
Qty: 9 TABLET | Refills: 2 | Status: SHIPPED | OUTPATIENT
Start: 2019-11-21 | End: 2020-08-22 | Stop reason: ALTCHOICE

## 2019-11-21 RX ORDER — TOPIRAMATE 50 MG/1
50 TABLET, FILM COATED ORAL 2 TIMES DAILY
Qty: 180 TABLET | Refills: 0 | Status: SHIPPED | OUTPATIENT
Start: 2019-11-21 | End: 2020-08-22 | Stop reason: ALTCHOICE

## 2019-11-21 NOTE — ASSESSMENT & PLAN NOTE
Since the Topamax had been providing some relief for her symptoms, will increase dose to 50 mg twice a day  Will switch the Maxalt to Imitrex to see if this will provide better coverage for her migraine symptoms  Will also add magnesium to be taken daily  Because migraines have been going on for over a year, will order an MRI for further evaluation  If there is no improvement with symptoms, or any significant abnormalities noted on MRI, may refer to Neurology for further evaluation

## 2019-11-21 NOTE — ASSESSMENT & PLAN NOTE
Since there is no improvement in constipation with current medications, will prescribe Amitiza to see if this will help relieve constipation  Would recommend continuing with lactulose at this time  Once bowel movements have been establish, would recommend stopping the lactulose and continuing with Amitiza  Discussed side effects of medication with patient

## 2019-11-21 NOTE — PROGRESS NOTES
Assessment/Plan:    Migraine without aura and without status migrainosus, not intractable  Since the Topamax had been providing some relief for her symptoms, will increase dose to 50 mg twice a day  Will switch the Maxalt to Imitrex to see if this will provide better coverage for her migraine symptoms  Will also add magnesium to be taken daily  Because migraines have been going on for over a year, will order an MRI for further evaluation  If there is no improvement with symptoms, or any significant abnormalities noted on MRI, may refer to Neurology for further evaluation  Constipation  Since there is no improvement in constipation with current medications, will prescribe Amitiza to see if this will help relieve constipation  Would recommend continuing with lactulose at this time  Once bowel movements have been establish, would recommend stopping the lactulose and continuing with Amitiza  Discussed side effects of medication with patient  Diagnoses and all orders for this visit:    Migraine without aura and without status migrainosus, not intractable  -     MRI brain w wo contrast; Future  -     topiramate (TOPAMAX) 50 MG tablet; Take 1 tablet (50 mg total) by mouth 2 (two) times a day  -     Magnesium 400 MG TABS; Take 1 tablet (400 mg total) by mouth daily  -     Discontinue: SUMAtriptan (IMITREX) 100 mg tablet; Take 1 tablet (100 mg total) by mouth once as needed for migraine for up to 1 dose Can repeat dose in 2 hours  Do not exceed 2 tablets in 1 day  -     Discontinue: SUMAtriptan (IMITREX) 100 mg tablet; Take 1 tablet (100 mg total) by mouth once as needed for migraine for up to 1 dose Can repeat dose in 2 hours  Do not exceed 2 tablets in 1 day  -     SUMAtriptan (IMITREX) 100 mg tablet; Take 1 tablet (100 mg total) by mouth once as needed for migraine for up to 1 dose Can repeat dose in 2 hours    Don't exceed 2 tabs in 1 day    Constipation, unspecified constipation type  - lubiprostone (AMITIZA) 8 mcg capsule; Take 1 capsule (8 mcg total) by mouth 2 (two) times a day with meals    Encounter for immunization  -     influenza vaccine, [id number], quadrivalent, 0 5 mL, preservative-free, for adult and pediatric patients 6 mos+ (AFLURIA, FLUARIX, FLULAVAL, FLUZONE)          Subjective:      Patient ID: Isabel Serrano is a 30 y o  female  20-year-old female presenting for follow-up of migraines and constipation  Was seen here 2 months ago for migraines  Patient states that she just got her lab work completed this morning  Was given a prescription of Topamax 25 mg to be taken twice a day, and Maxalt  Patient states that the Topamax was providing short relief of her migraines, but then they would come back  Does not believe that the Maxalt was helping at all  Denies any changes in her symptoms  Describes as a pounding sensation throughout her head  Does have photophobia, and nausea with her symptoms  Denies any vision changes, weakness in extremities, facial weakness  Patient was seen here 1 month ago for constipation  States that she is having roughly 1 bowel movement a week  Has to strain with bowel movements  And does not feel like she has completely evacuated her bowels  Was given a prescription for lactulose and senna, but states that this has not been providing any relief of her symptoms  Has been experiencing left-sided abdominal pain, and nausea with her constipation  The following portions of the patient's history were reviewed and updated as appropriate:   She  has a past medical history of Frequent headaches and No known health problems  She   Patient Active Problem List    Diagnosis Date Noted    Constipation 10/31/2019    Migraine without aura and without status migrainosus, not intractable 09/26/2019     She  has a past surgical history that includes No past surgeries  Her family history is not on file    She  reports that she has been smoking cigarettes  She has been smoking about 0 25 packs per day  She has never used smokeless tobacco  She reports that she drank alcohol  She reports that she does not use drugs  Current Outpatient Medications   Medication Sig Dispense Refill    ibuprofen (MOTRIN) 600 mg tablet Take 1 tablet (600 mg total) by mouth every 6 (six) hours as needed for moderate pain 30 tablet 0    lactulose 20 g/30 mL Take 15 mL (10 g total) by mouth daily as needed (constipation) Titrate as directed  1 Bottle 0    lubiprostone (AMITIZA) 8 mcg capsule Take 1 capsule (8 mcg total) by mouth 2 (two) times a day with meals 180 capsule 0    Magnesium 400 MG TABS Take 1 tablet (400 mg total) by mouth daily 90 tablet 0    norgestimate-ethinyl estradiol (ORTHO-CYCLEN) 0 25-35 MG-MCG per tablet Take 1 tablet by mouth daily      pseudoephedrine (SUDAFED) 60 mg tablet Take 0 5 tablets (30 mg total) by mouth every 4 (four) hours as needed for congestion (Patient not taking: Reported on 11/21/2019) 30 tablet 0    SUMAtriptan (IMITREX) 100 mg tablet Take 1 tablet (100 mg total) by mouth once as needed for migraine for up to 1 dose Can repeat dose in 2 hours  Don't exceed 2 tabs in 1 day 9 tablet 2    topiramate (TOPAMAX) 50 MG tablet Take 1 tablet (50 mg total) by mouth 2 (two) times a day 180 tablet 0     No current facility-administered medications for this visit  She is allergic to apple       Review of Systems   Constitutional: Negative for chills, diaphoresis, fatigue and fever  HENT: Negative for congestion, rhinorrhea and sinus pain  Eyes: Positive for photophobia  Negative for visual disturbance  Respiratory: Negative for cough, chest tightness, shortness of breath and wheezing  Cardiovascular: Negative for chest pain and palpitations  Gastrointestinal: Positive for abdominal pain, constipation and nausea  Negative for blood in stool, diarrhea and vomiting  Genitourinary: Negative for dysuria and hematuria  Neurological: Positive for headaches  Negative for dizziness, syncope, facial asymmetry, speech difficulty, weakness, light-headedness and numbness  Psychiatric/Behavioral: Negative for dysphoric mood and sleep disturbance  The patient is not nervous/anxious  Objective:      /60 (BP Location: Left arm, Patient Position: Sitting, Cuff Size: Standard)   Pulse 95   Temp 98 2 °F (36 8 °C) (Temporal)   Resp 18   Ht 4' 10" (1 473 m)   Wt 51 7 kg (114 lb)   LMP 11/09/2019 (Exact Date)   SpO2 99%   Breastfeeding? No   BMI 23 83 kg/m²          Physical Exam   Constitutional: She is oriented to person, place, and time  She appears well-developed and well-nourished  No distress  Eyes: Pupils are equal, round, and reactive to light  Conjunctivae and EOM are normal    Neck: No JVD present  Cardiovascular: Normal rate, regular rhythm and normal heart sounds  Exam reveals no gallop and no friction rub  No murmur heard  Pulmonary/Chest: Effort normal and breath sounds normal  No respiratory distress  She has no wheezes  She has no rales  Abdominal: Soft  Normal appearance  She exhibits no distension  Bowel sounds are decreased  There is no hepatosplenomegaly  There is tenderness in the left upper quadrant and left lower quadrant  There is no rigidity, no rebound and no guarding  Lymphadenopathy:     She has no cervical adenopathy  Neurological: She is alert and oriented to person, place, and time  She displays normal reflexes  No cranial nerve deficit  She exhibits normal muscle tone  Coordination normal    Skin: She is not diaphoretic  Psychiatric: She has a normal mood and affect  Her behavior is normal  Thought content normal    Nursing note and vitals reviewed

## 2019-11-22 LAB
B BURGDOR IGG PATRN SER IB-IMP: POSITIVE
B BURGDOR IGG+IGM SER-ACNC: 2.67 ISR (ref 0–0.9)
B BURGDOR IGM PATRN SER IB-IMP: NEGATIVE
B BURGDOR18KD IGG SER QL IB: PRESENT
B BURGDOR23KD IGG SER QL IB: ABNORMAL
B BURGDOR23KD IGM SER QL IB: PRESENT
B BURGDOR28KD IGG SER QL IB: PRESENT
B BURGDOR30KD IGG SER QL IB: PRESENT
B BURGDOR39KD IGG SER QL IB: PRESENT
B BURGDOR39KD IGM SER QL IB: ABNORMAL
B BURGDOR41KD IGG SER QL IB: PRESENT
B BURGDOR41KD IGM SER QL IB: ABNORMAL
B BURGDOR45KD IGG SER QL IB: ABNORMAL
B BURGDOR58KD IGG SER QL IB: PRESENT
B BURGDOR66KD IGG SER QL IB: ABNORMAL
B BURGDOR93KD IGG SER QL IB: PRESENT

## 2019-11-22 NOTE — ED PROVIDER NOTES
History  Chief Complaint   Patient presents with    Constipation     pt reports no BM x several days - saw PCP today and took prescribed meds w/o relief of constipation, pt reports rectal pressure     29 y/o female c/o refractory constipation for approximately three months  Patient states she was seen by her PCP earlier today and prescribed two medications (unsure as to which) for her constipation; scant amount afterwards  Denies nausea/vomiting  No fever or chills; no vaginal bleeding or discharge  Patient also seen by PCP on 10/31 for similar complaints/symptoms and prescribed lactulose and senna at that time  No GI followup  No history of IBD/IBS  Surgical history noncontributory  Prior to Admission Medications   Prescriptions Last Dose Informant Patient Reported? Taking? Magnesium 400 MG TABS   No No   Sig: Take 1 tablet (400 mg total) by mouth daily   SUMAtriptan (IMITREX) 100 mg tablet   No No   Sig: Take 1 tablet (100 mg total) by mouth once as needed for migraine for up to 1 dose Can repeat dose in 2 hours  Don't exceed 2 tabs in 1 day   cephalexin (KEFLEX) 500 mg capsule   No No   Sig: Take 1 capsule (500 mg total) by mouth every 6 (six) hours for 7 days   ibuprofen (MOTRIN) 600 mg tablet   No No   Sig: Take 1 tablet (600 mg total) by mouth every 6 (six) hours as needed for moderate pain   lactulose 20 g/30 mL   No No   Sig: Take 15 mL (10 g total) by mouth daily as needed (constipation) Titrate as directed     lubiprostone (AMITIZA) 8 mcg capsule   No No   Sig: Take 1 capsule (8 mcg total) by mouth 2 (two) times a day with meals   norgestimate-ethinyl estradiol (ORTHO-CYCLEN) 0 25-35 MG-MCG per tablet   Yes No   Sig: Take 1 tablet by mouth daily   pseudoephedrine (SUDAFED) 60 mg tablet   No No   Sig: Take 0 5 tablets (30 mg total) by mouth every 4 (four) hours as needed for congestion   Patient not taking: Reported on 11/21/2019   topiramate (TOPAMAX) 50 MG tablet   No No   Sig: Take 1 tablet (50 mg total) by mouth 2 (two) times a day      Facility-Administered Medications: None       Past Medical History:   Diagnosis Date    Frequent headaches        Past Surgical History:   Procedure Laterality Date    NO PAST SURGERIES         History reviewed  No pertinent family history  I have reviewed and agree with the history as documented  Social History     Tobacco Use    Smoking status: Current Every Day Smoker     Packs/day: 0 25     Types: Cigarettes    Smokeless tobacco: Never Used   Substance Use Topics    Alcohol use: Not Currently    Drug use: No        Review of Systems   Gastrointestinal: Positive for abdominal distention, abdominal pain and constipation  All other systems reviewed and are negative  Physical Exam  Physical Exam   Constitutional: She is oriented to person, place, and time  She appears well-developed and well-nourished  No distress  HENT:   Head: Normocephalic and atraumatic  Nose: Nose normal    Mouth/Throat: Oropharynx is clear and moist    Eyes: Pupils are equal, round, and reactive to light  Conjunctivae and EOM are normal    Neck: Normal range of motion  Neck supple  Cardiovascular: Normal rate, regular rhythm and normal heart sounds  Pulmonary/Chest: Effort normal and breath sounds normal    Abdominal: Soft  Normal appearance and bowel sounds are normal  She exhibits distension  There is no hepatosplenomegaly  There is generalized tenderness  Musculoskeletal: Normal range of motion  Neurological: She is alert and oriented to person, place, and time  Skin: Skin is warm and dry  Capillary refill takes less than 2 seconds  She is not diaphoretic  Psychiatric: She has a normal mood and affect  Vitals reviewed        Vital Signs  ED Triage Vitals [11/21/19 2220]   Temperature Pulse Respirations Blood Pressure SpO2   (!) 97 °F (36 1 °C) 88 16 112/82 96 %      Temp Source Heart Rate Source Patient Position - Orthostatic VS BP Location FiO2 (%) Tympanic Monitor Sitting Left arm --      Pain Score       9           Vitals:    11/21/19 2220   BP: 112/82   Pulse: 88   Patient Position - Orthostatic VS: Sitting         Visual Acuity      ED Medications  Medications - No data to display    Diagnostic Studies  Results Reviewed     Procedure Component Value Units Date/Time    POCT pregnancy, urine [[id number]]  (Normal) Resulted:  11/21/19 2235    Lab Status:  Final result Updated:  11/21/19 2236     EXT PREG TEST UR (Ref: Negative) Negative     Control Valid                 XR abdomen obstruction series   ED Interpretation by Maximus Ortega DO (11/21 2245)   No signs of SBO; no free air  Procedures  Procedures       ED Course                               MDM  Number of Diagnoses or Management Options  Abdominal pain:   Constipation, unspecified constipation type:   Diagnosis management comments: UHCG negative; no McBurney's point tenderness - afebrile  Disposition  Final diagnoses:   Constipation, unspecified constipation type   Abdominal pain     Time reflects when diagnosis was documented in both MDM as applicable and the Disposition within this note     Time User Action Codes Description Comment    11/21/2019 10:46 PM Caesar Ochoa Add [K59 00] Constipation, unspecified constipation type     11/21/2019 10:46 PM Caesar Cottogy Add [R10 9] Abdominal pain       ED Disposition     ED Disposition Condition Date/Time Comment    Discharge Stable Thu Nov 21, 2019 10:45 PM [address] discharge to home/self care              Follow-up Information     Follow up With Specialties Details Why Contact Info    [place] PA-C Family Medicine, Physician Assistant Schedule an appointment as soon as possible for a visit in 1 week As needed [address]  [address]            Patient's Medications   Discharge Prescriptions    BISACODYL (FLEET) 10 MG/30ML ENEM    Insert 30 mL (10 mg total) into the rectum once for 1 dose       Start Date: 11/21/2019End Date: 11/21/2019       Order Dose: 10 mg       Quantity: 30 mL    Refills: 1     No discharge procedures on file      ED Provider  Electronically Signed by           Lolly Spann,   11/21/19 [address] [facility] [address],   11/21/19 2141

## 2019-11-24 DIAGNOSIS — A69.20 LYME DISEASE: Primary | ICD-10-CM

## 2019-11-24 RX ORDER — AMOXICILLIN 500 MG/1
500 CAPSULE ORAL EVERY 8 HOURS SCHEDULED
Qty: 63 CAPSULE | Refills: 0 | Status: SHIPPED | OUTPATIENT
Start: 2019-11-24 | End: 2019-12-15

## 2019-12-02 ENCOUNTER — TELEPHONE (OUTPATIENT)
Dept: FAMILY MEDICINE CLINIC | Facility: CLINIC | Age: 29
End: 2019-12-02

## 2019-12-02 NOTE — TELEPHONE ENCOUNTER
Pt's ins denied MRI of brain, but you can do a peer to peer at [phone], under tracking# [id number], under Luigi Toribio  Let me know what you would like pt to do next

## 2019-12-10 NOTE — TELEPHONE ENCOUNTER
Was started on new medication  Plus is being treated for Lyme  Will see if there is any changes in symptoms

## 2019-12-18 DIAGNOSIS — G43.009 MIGRAINE WITHOUT AURA AND WITHOUT STATUS MIGRAINOSUS, NOT INTRACTABLE: ICD-10-CM

## 2019-12-18 DIAGNOSIS — K59.00 CONSTIPATION, UNSPECIFIED CONSTIPATION TYPE: ICD-10-CM

## 2019-12-19 RX ORDER — LACTULOSE 10 G/15ML
SOLUTION ORAL
Qty: 1373 ML | Refills: 0 | OUTPATIENT
Start: 2019-12-19

## 2019-12-19 RX ORDER — TOPIRAMATE 25 MG/1
25 TABLET ORAL 2 TIMES DAILY
Qty: 180 TABLET | Refills: 0 | OUTPATIENT
Start: 2019-12-19

## 2019-12-31 ENCOUNTER — HOSPITAL ENCOUNTER (EMERGENCY)
Facility: HOSPITAL | Age: 29
Discharge: HOME | End: 2019-12-31
Attending: EMERGENCY MEDICINE | Admitting: EMERGENCY MEDICINE
Payer: COMMERCIAL

## 2019-12-31 VITALS
WEIGHT: 117.28 LBS | RESPIRATION RATE: 16 BRPM | BODY MASS INDEX: 24.51 KG/M2 | TEMPERATURE: 98 F | OXYGEN SATURATION: 100 % | DIASTOLIC BLOOD PRESSURE: 53 MMHG | HEART RATE: 80 BPM | SYSTOLIC BLOOD PRESSURE: 97 MMHG

## 2019-12-31 DIAGNOSIS — M54.40 BACK PAIN OF LUMBAR REGION WITH SCIATICA: Primary | ICD-10-CM

## 2019-12-31 DIAGNOSIS — B34.9 VIRAL SYNDROME: ICD-10-CM

## 2019-12-31 PROCEDURE — 99284 EMERGENCY DEPT VISIT MOD MDM: CPT | Performed by: PHYSICIAN ASSISTANT

## 2019-12-31 PROCEDURE — 96372 THER/PROPH/DIAG INJ SC/IM: CPT

## 2019-12-31 PROCEDURE — 99283 EMERGENCY DEPT VISIT LOW MDM: CPT

## 2019-12-31 RX ORDER — METHOCARBAMOL 500 MG/1
500 TABLET, FILM COATED ORAL 3 TIMES DAILY
Qty: 20 TABLET | Refills: 0 | Status: SHIPPED | OUTPATIENT
Start: 2019-12-31 | End: 2020-08-22 | Stop reason: ALTCHOICE

## 2019-12-31 RX ORDER — METHOCARBAMOL 500 MG/1
750 TABLET, FILM COATED ORAL ONCE
Status: COMPLETED | OUTPATIENT
Start: 2019-12-31 | End: 2019-12-31

## 2019-12-31 RX ORDER — KETOROLAC TROMETHAMINE 30 MG/ML
30 INJECTION, SOLUTION INTRAMUSCULAR; INTRAVENOUS ONCE
Status: COMPLETED | OUTPATIENT
Start: 2019-12-31 | End: 2019-12-31

## 2019-12-31 RX ADMIN — KETOROLAC TROMETHAMINE 30 MG: 30 INJECTION, SOLUTION INTRAMUSCULAR at 14:31

## 2019-12-31 RX ADMIN — METHOCARBAMOL 750 MG: 500 TABLET, FILM COATED ORAL at 14:31

## 2020-01-01 NOTE — ED PROVIDER NOTES
History  Chief Complaint   Patient presents with    Back Pain     here in the past with the same, back pain the radiates down the Leg     Patient is a 41-year-old female with past any history of migraine headaches who presents today for evaluation of bilateral lumbar back pain that radiates down bilateral legs for the past 2 days  Patient reports her last menstrual period was 2 weeks ago  She denies any significant vaginal bleeding or pain with urination  She reports she has had this pain before and has been to this emergency department for for this  She denies any nausea or vomiting  No saddle anesthesia  No lower extremity weakness  No bowel or bladder incontinence  No recent injury  Patient reports she took ibuprofen 600 mg about 3 hours ago  She reported minimal relief  Back Pain       Prior to Admission Medications   Prescriptions Last Dose Informant Patient Reported? Taking? Magnesium 400 MG TABS   No No   Sig: Take 1 tablet (400 mg total) by mouth daily   SUMAtriptan (IMITREX) 100 mg tablet   No No   Sig: Take 1 tablet (100 mg total) by mouth once as needed for migraine for up to 1 dose Can repeat dose in 2 hours  Don't exceed 2 tabs in 1 day   bisacodyl (FLEET) 10 MG/30ML ENEM   No No   Sig: Insert 30 mL (10 mg total) into the rectum once for 1 dose   ibuprofen (MOTRIN) 600 mg tablet   No No   Sig: Take 1 tablet (600 mg total) by mouth every 6 (six) hours as needed for moderate pain   lactulose 20 g/30 mL   No No   Sig: Take 15 mL (10 g total) by mouth daily as needed (constipation) Titrate as directed     lubiprostone (AMITIZA) 8 mcg capsule   No No   Sig: Take 1 capsule (8 mcg total) by mouth 2 (two) times a day with meals   norgestimate-ethinyl estradiol (ORTHO-CYCLEN) 0 25-35 MG-MCG per tablet   Yes No   Sig: Take 1 tablet by mouth daily   pseudoephedrine (SUDAFED) 60 mg tablet   No No   Sig: Take 0 5 tablets (30 mg total) by mouth every 4 (four) hours as needed for congestion   Patient not taking: Reported on 11/21/2019   topiramate (TOPAMAX) 50 MG tablet   No No   Sig: Take 1 tablet (50 mg total) by mouth 2 (two) times a day      Facility-Administered Medications: None       Past Medical History:   Diagnosis Date    Frequent headaches        Past Surgical History:   Procedure Laterality Date    NO PAST SURGERIES         History reviewed  No pertinent family history  I have reviewed and agree with the history as documented  Social History     Tobacco Use    Smoking status: Current Every Day Smoker     Packs/day: 0 25     Types: Cigarettes    Smokeless tobacco: Never Used   Substance Use Topics    Alcohol use: Not Currently    Drug use: No        Review of Systems   Constitutional: Negative  HENT: Negative  Eyes: Negative  Respiratory: Negative  Cardiovascular: Negative  Gastrointestinal: Negative  Endocrine: Negative  Genitourinary: Negative  Musculoskeletal: Positive for back pain  Allergic/Immunologic: Negative  Neurological: Negative  Hematological: Negative  Psychiatric/Behavioral: Negative  Physical Exam  Physical Exam   Constitutional: She is oriented to person, place, and time  She appears well-developed and well-nourished  Cardiovascular: Normal rate, regular rhythm and normal heart sounds  Pulmonary/Chest: Effort normal and breath sounds normal    Musculoskeletal:        Back:    Lower extremity strength intact  Plus two dorsalis pedis pulses noted bilaterally  No distal cyanosis  No atrophy  No saddle anesthesia  Sensation intact  Patient has noted tenderness of paraspinal muscles of lumbar spine  No spinous process tenderness  No bony abnormalities  Neurological: She is alert and oriented to person, place, and time  Skin: Skin is warm         Vital Signs  ED Triage Vitals [12/31/19 1352]   Temperature Pulse Respirations Blood Pressure SpO2   98 °F (36 7 °C) 80 16 97/53 100 %      Temp Source Heart Rate Source Patient Position - Orthostatic VS BP Location FiO2 (%)   Tympanic -- Sitting Left arm --      Pain Score       8           Vitals:    12/31/19 1352   BP: 97/53   Pulse: 80   Patient Position - Orthostatic VS: Sitting         Visual Acuity      ED Medications  Medications   ketorolac (TORADOL) injection 30 mg (30 mg Intramuscular Given 12/31/19 1431)   methocarbamol (ROBAXIN) tablet 750 mg (750 mg Oral Given 12/31/19 1431)       Diagnostic Studies  Results Reviewed     None                 No orders to display              Procedures  Procedures         ED Course                               MDM  Number of Diagnoses or Management Options  Back pain of lumbar region with sciatica:   Diagnosis management comments: Patient given Toradol and Robaxin for lumbar back pain  She has no concerning signs symptoms of significant neuropathy on exam   Strength intact  No bowel or bladder incontinence  No known injury  Patient sent ancillary spinal program   I reviewed strict indications on if and when to return to the emergency department with the patient  Patient discharged home stable  Disposition  Final diagnoses:   Back pain of lumbar region with sciatica     Time reflects when diagnosis was documented in both MDM as applicable and the Disposition within this note     Time User Action Codes Description Comment    12/31/2019  2:24 PM Alison Hankins Add [M54 40] Back pain of lumbar region with sciatica     12/31/2019  2:24 PM Primitivo PRIETO Add [B34 9] Viral syndrome       ED Disposition     ED Disposition Condition Date/Time Comment    Discharge Stable Tue Dec 31, 2019  2:24 PM [address] discharge to home/self care              Follow-up Information     Follow up With Specialties Details Why Contact Info    Cuca Jackson PA-C Family Medicine, Physician Assistant Schedule an appointment as soon as possible for a visit   [address] [place] [address]  [address] [facility]  [place] [address]  [phone] Discharge Medication List as of 12/31/2019  2:25 PM      START taking these medications    Details   methocarbamol (ROBAXIN) 500 mg tablet Take 1 tablet (500 mg total) by mouth 3 (three) times a day, Starting Tue 12/31/2019, Normal         CONTINUE these medications which have NOT CHANGED    Details   bisacodyl (FLEET) 10 MG/30ML ENEM Insert 30 mL (10 mg total) into the rectum once for 1 dose, Starting Thu 11/21/2019, Print      ibuprofen (MOTRIN) 600 mg tablet Take 1 tablet (600 mg total) by mouth every 6 (six) hours as needed for moderate pain, Starting Wed 11/13/2019, Print      lactulose 20 g/30 mL Take 15 mL (10 g total) by mouth daily as needed (constipation) Titrate as directed , Starting Thu 10/31/2019, Normal      lubiprostone (AMITIZA) 8 mcg capsule Take 1 capsule (8 mcg total) by mouth 2 (two) times a day with meals, Starting Thu 11/21/2019, Normal      Magnesium 400 MG TABS Take 1 tablet (400 mg total) by mouth daily, Starting Thu 11/21/2019, Normal      norgestimate-ethinyl estradiol (ORTHO-CYCLEN) 0 25-35 MG-MCG per tablet Take 1 tablet by mouth daily, Starting Wed 11/13/2019, Historical Med      pseudoephedrine (SUDAFED) 60 mg tablet Take 0 5 tablets (30 mg total) by mouth every 4 (four) hours as needed for congestion, Starting Wed 11/13/2019, Print      SUMAtriptan (IMITREX) 100 mg tablet Take 1 tablet (100 mg total) by mouth once as needed for migraine for up to 1 dose Can repeat dose in 2 hours    Don't exceed 2 tabs in 1 day, Starting Thu 11/21/2019, Normal      topiramate (TOPAMAX) 50 MG tablet Take 1 tablet (50 mg total) by mouth 2 (two) times a day, Starting Thu 11/21/2019, Normal               ED Provider  Electronically Signed by           Shivam King PA-C  12/31/19 2091

## 2020-01-02 ENCOUNTER — TELEPHONE (OUTPATIENT)
Dept: PHYSICAL THERAPY | Facility: OTHER | Age: 30
End: 2020-01-02

## 2020-01-02 ENCOUNTER — NURSE TRIAGE (OUTPATIENT)
Dept: PHYSICAL THERAPY | Facility: OTHER | Age: 30
End: 2020-01-02

## 2020-01-02 DIAGNOSIS — M54.42 ACUTE MIDLINE LOW BACK PAIN WITH BILATERAL SCIATICA: Primary | ICD-10-CM

## 2020-01-02 DIAGNOSIS — M54.41 ACUTE MIDLINE LOW BACK PAIN WITH BILATERAL SCIATICA: Primary | ICD-10-CM

## 2020-01-02 NOTE — TELEPHONE ENCOUNTER
Additional Information   Negative: Is this related to a work injury?  Negative: Is this related to an MVA?  Negative: Are you currently recieving homecare services?  Negative: Has the patient had unexplained weight loss?  Negative: Does the patient have a fever?  Negative: Is the patient experiencing urine retention?  Negative: Is the patient experiencing acute drop foot or paralysis?  Negative: Has the patient experienced major trauma? (fall from height, high speed collision, direct blow to spine) and is also experiencing nausea, light-headedness, or loss of consciousness?  Negative: Is the patient experiencing blood in sputum?  Negative: Is this a chronic condition? Background - Initial Assessment  Clinical complaint: Acute low back pain that radiates down both legs  No known injury, but was diagnosed with Lyme's disease  Pt was given Robaxin,but has not started to take that medication, but is using heat to her low back  Pt has had this low back pain for two days  Pt is not following any specialist for her neck/back, and has not had not had any neck/back surgeries  Date of onset: 2 days  Frequency of pain: constant  Quality of pain: aching, shooting down her legs  Protocols used: [facility] COMPREHENSIVE SPINE PROGRAM PROTOCOL    This nurse did review in detail the comp spine program and what we can provide for the pt for their back pain  Pt is agreeable to being triaged by this nurse and would like to have physical therapy  Referrals was placed to the following:  Physical Therapy at the [facility] site  Pt was given the ph # to that office  No further questions voiced at this time and Pt did state understanding of the referral that was placed

## 2020-01-23 ENCOUNTER — OFFICE VISIT (OUTPATIENT)
Dept: FAMILY MEDICINE CLINIC | Facility: CLINIC | Age: 30
End: 2020-01-23

## 2020-01-23 ENCOUNTER — TELEPHONE (OUTPATIENT)
Dept: NEUROLOGY | Facility: CLINIC | Age: 30
End: 2020-01-23

## 2020-01-23 ENCOUNTER — TELEPHONE (OUTPATIENT)
Dept: FAMILY MEDICINE CLINIC | Facility: CLINIC | Age: 30
End: 2020-01-23

## 2020-01-23 VITALS
SYSTOLIC BLOOD PRESSURE: 100 MMHG | OXYGEN SATURATION: 99 % | BODY MASS INDEX: 24.14 KG/M2 | HEIGHT: 58 IN | TEMPERATURE: 99 F | WEIGHT: 115 LBS | RESPIRATION RATE: 18 BRPM | HEART RATE: 79 BPM | DIASTOLIC BLOOD PRESSURE: 62 MMHG

## 2020-01-23 DIAGNOSIS — G47.30 SLEEP APNEA, UNSPECIFIED TYPE: ICD-10-CM

## 2020-01-23 DIAGNOSIS — G43.009 MIGRAINE WITHOUT AURA AND WITHOUT STATUS MIGRAINOSUS, NOT INTRACTABLE: Primary | ICD-10-CM

## 2020-01-23 DIAGNOSIS — M25.50 ARTHRALGIA, UNSPECIFIED JOINT: ICD-10-CM

## 2020-01-23 PROCEDURE — 3008F BODY MASS INDEX DOCD: CPT | Performed by: PHYSICIAN ASSISTANT

## 2020-01-23 PROCEDURE — 99213 OFFICE O/P EST LOW 20 MIN: CPT | Performed by: PHYSICIAN ASSISTANT

## 2020-01-23 RX ORDER — GABAPENTIN 300 MG/1
300 CAPSULE ORAL 3 TIMES DAILY
Qty: 270 CAPSULE | Refills: 1 | Status: SHIPPED | OUTPATIENT
Start: 2020-01-23 | End: 2020-10-08

## 2020-01-23 NOTE — TELEPHONE ENCOUNTER
LM on VM     Sleep dr VOSS([phone]) is on 3/31/20 at 1 pm at [address] [facility], [facility], [facility]   Neuro apt [phone]) is on 7/1/20 at 10 am at [facility], [place] [address] [facility],[place] [address]

## 2020-01-23 NOTE — ASSESSMENT & PLAN NOTE
Patient has been not been getting any benefit from Topamax  At this time will switch to gabapentin to see if this will help with the muscle aches, and also headaches  Will put in referral for Neurology for further evaluation

## 2020-01-23 NOTE — TELEPHONE ENCOUNTER
Best contact number for patient:  [phone]  Emergency Contact name and number:  Agus Lisa - [phone]  Referring provider:  [facility]   Referring provider Telephone:________________    Primary Care Provider Name:   [facility]  Reason for Appointment/Dx:  headache  Neurology Location patient would like to be seen:  CV  Order received? Yes                                                 Records Received? Yes    Have you ever seen another Neurologist? No    Insurance Information    Insurance Name:  [employer]  ID/Policy #:    Secondary Insurance:    ID/Policy#: Workman's Comp/ Accident/ School  Information      Workman's Comp/Accident/School related? No    If yes name of Insurance company:    Date of Injury:    Open Claim:no    [address] Name and Telephone Number:    Notes:    New pt appt made with Dr Madhuri PLEITEZ for July 1 - new pt ppwk mailed to home - placed on waitlist for sooner appt                 Appointment date: _____________________________

## 2020-01-23 NOTE — PROGRESS NOTES
Assessment/Plan:    Migraine without aura and without status migrainosus, not intractable  Patient has been not been getting any benefit from Topamax  At this time will switch to gabapentin to see if this will help with the muscle aches, and also headaches  Will put in referral for Neurology for further evaluation  With concerns of possible sleep apnea, which could be contributing to some of her symptoms, will refer to sleep medicine for further evaluation  Diagnoses and all orders for this visit:    Migraine without aura and without status migrainosus, not intractable  -     Ambulatory referral to Neurology; Future  -     gabapentin (NEURONTIN) 300 mg capsule; Take 1 capsule (300 mg total) by mouth 3 (three) times a day    Sleep apnea, unspecified type  -     Ambulatory referral to Sleep Medicine; Future    Arthralgia, unspecified joint  -     gabapentin (NEURONTIN) 300 mg capsule; Take 1 capsule (300 mg total) by mouth 3 (three) times a day          Subjective:      Patient ID: Elena Arizmendi is a 30 y o  female  60-year-old female presenting for follow-up of chronic headaches  Patient was started on Topamax half months ago  Does was increase cut she was not receiving any benefit from the medication  Today states that there has been no changes in her headaches  Still consistently having the headaches daily  Denies any changes in sensation, or denies new symptoms  Patient does have concerns with possible side effects of Topamax  States that since the dose was increased she has been having tingling sensation in her fingers  Has also had concerns with increased fatigue and body aches  States she is finding it hard to do any activity throughout the day  She has limited energy  States that she sleeping about 8 hours a night, and boyfriend comments that patient does snore through the night, and at cage [place] for air  When patient wakes up in the morning she still feels fatigued  Currently denies any vision changes, chest pain, heart palpitations, shortness of breath, abdominal pain, nausea, vomiting  The following portions of the patient's history were reviewed and updated as appropriate:   She  has a past medical history of Frequent headaches  She   Patient Active Problem List    Diagnosis Date Noted    Constipation 10/31/2019    Migraine without aura and without status migrainosus, not intractable 09/26/2019     She  has a past surgical history that includes No past surgeries  Her family history is not on file  She  reports that she has been smoking cigarettes  She has been smoking about 0 25 packs per day  She has never used smokeless tobacco  She reports that she drank alcohol  She reports that she does not use drugs  Current Outpatient Medications   Medication Sig Dispense Refill    lactulose 20 g/30 mL Take 15 mL (10 g total) by mouth daily as needed (constipation) Titrate as directed   1 Bottle 0    Magnesium 400 MG TABS Take 1 tablet (400 mg total) by mouth daily 90 tablet 0    norgestimate-ethinyl estradiol (ORTHO-CYCLEN) 0 25-35 MG-MCG per tablet Take 1 tablet by mouth daily      topiramate (TOPAMAX) 50 MG tablet Take 1 tablet (50 mg total) by mouth 2 (two) times a day 180 tablet 0    bisacodyl (FLEET) 10 MG/30ML ENEM Insert 30 mL (10 mg total) into the rectum once for 1 dose 30 mL 1    gabapentin (NEURONTIN) 300 mg capsule Take 1 capsule (300 mg total) by mouth 3 (three) times a day 270 capsule 1    ibuprofen (MOTRIN) 600 mg tablet Take 1 tablet (600 mg total) by mouth every 6 (six) hours as needed for moderate pain (Patient not taking: Reported on 1/23/2020) 30 tablet 0    lubiprostone (AMITIZA) 8 mcg capsule Take 1 capsule (8 mcg total) by mouth 2 (two) times a day with meals (Patient not taking: Reported on 1/23/2020) 180 capsule 0    methocarbamol (ROBAXIN) 500 mg tablet Take 1 tablet (500 mg total) by mouth 3 (three) times a day (Patient not taking: Reported on 1/23/2020) 20 tablet 0    pseudoephedrine (SUDAFED) 60 mg tablet Take 0 5 tablets (30 mg total) by mouth every 4 (four) hours as needed for congestion (Patient not taking: Reported on 11/21/2019) 30 tablet 0    SUMAtriptan (IMITREX) 100 mg tablet Take 1 tablet (100 mg total) by mouth once as needed for migraine for up to 1 dose Can repeat dose in 2 hours  Don't exceed 2 tabs in 1 day (Patient not taking: Reported on 1/23/2020) 9 tablet 2     No current facility-administered medications for this visit  She is allergic to apple       Review of Systems   Constitutional: Positive for fatigue  Negative for activity change, appetite change, chills, diaphoresis, fever and unexpected weight change  HENT: Negative for congestion, ear pain, rhinorrhea, sinus pain and sore throat  Eyes: Positive for photophobia  Negative for visual disturbance  Respiratory: Positive for apnea  Negative for cough, chest tightness, shortness of breath and wheezing  Cardiovascular: Negative for chest pain and palpitations  Gastrointestinal: Negative for abdominal pain, blood in stool, constipation, diarrhea, nausea and vomiting  Endocrine: Negative for cold intolerance, heat intolerance, polydipsia, polyphagia and polyuria  Genitourinary: Negative for dysuria and hematuria  Musculoskeletal: Positive for arthralgias, back pain and myalgias  Negative for gait problem, joint swelling, neck pain and neck stiffness  Neurological: Positive for headaches  Negative for dizziness, syncope, facial asymmetry, speech difficulty, weakness, light-headedness and numbness  Psychiatric/Behavioral: Negative for dysphoric mood and sleep disturbance  The patient is not nervous/anxious            Objective:      /62 (BP Location: Left arm, Patient Position: Sitting, Cuff Size: Standard)   Pulse 79   Temp 99 °F (37 2 °C) (Temporal)   Resp 18   Ht 4' 10" (1 473 m)   Wt 52 2 kg (115 lb)   LMP 01/04/2020 (Exact Date) SpO2 99%   Breastfeeding No   BMI 24 04 kg/m²          Physical Exam   Constitutional: She is oriented to person, place, and time  She appears well-developed and well-nourished  No distress  HENT:   Right Ear: External ear normal    Left Ear: External ear normal    Nose: Nose normal    Mouth/Throat: Oropharynx is clear and moist    Eyes: Pupils are equal, round, and reactive to light  Conjunctivae and EOM are normal    Neck: Normal range of motion  Neck supple  Cardiovascular: Normal rate, regular rhythm and normal heart sounds  Exam reveals no gallop and no friction rub  No murmur heard  Pulmonary/Chest: Effort normal and breath sounds normal  No respiratory distress  She has no wheezes  Abdominal: Soft  Bowel sounds are normal  She exhibits no distension  There is no tenderness  There is no guarding  Musculoskeletal: Normal range of motion  She exhibits no edema  Lymphadenopathy:     She has no cervical adenopathy  Neurological: She is alert and oriented to person, place, and time  She displays normal reflexes  No cranial nerve deficit  She exhibits normal muscle tone  Coordination normal    Skin: She is not diaphoretic  Psychiatric: Her behavior is normal  Thought content normal  She exhibits a depressed mood  Nursing note and vitals reviewed

## 2020-03-04 ENCOUNTER — HOSPITAL ENCOUNTER (EMERGENCY)
Facility: HOSPITAL | Age: 30
Discharge: HOME | End: 2020-03-04
Attending: EMERGENCY MEDICINE
Payer: COMMERCIAL

## 2020-03-04 VITALS
TEMPERATURE: 98.4 F | WEIGHT: 116.4 LBS | RESPIRATION RATE: 16 BRPM | SYSTOLIC BLOOD PRESSURE: 114 MMHG | BODY MASS INDEX: 24.33 KG/M2 | OXYGEN SATURATION: 100 % | HEART RATE: 88 BPM | DIASTOLIC BLOOD PRESSURE: 59 MMHG

## 2020-03-04 DIAGNOSIS — H60.92 LEFT OTITIS EXTERNA: Primary | ICD-10-CM

## 2020-03-04 DIAGNOSIS — H66.92 LEFT OTITIS MEDIA: ICD-10-CM

## 2020-03-04 PROCEDURE — 99283 EMERGENCY DEPT VISIT LOW MDM: CPT | Performed by: EMERGENCY MEDICINE

## 2020-03-04 PROCEDURE — 99282 EMERGENCY DEPT VISIT SF MDM: CPT

## 2020-03-04 RX ORDER — AMOXICILLIN 500 MG/1
500 CAPSULE ORAL EVERY 12 HOURS SCHEDULED
Qty: 14 CAPSULE | Refills: 0 | Status: SHIPPED | OUTPATIENT
Start: 2020-03-04 | End: 2020-03-11

## 2020-03-04 RX ORDER — NEOMYCIN SULFATE, POLYMYXIN B SULFATE AND HYDROCORTISONE 10; 3.5; 1 MG/ML; MG/ML; [USP'U]/ML
4 SUSPENSION/ DROPS AURICULAR (OTIC) 3 TIMES DAILY
Qty: 10 ML | Refills: 0 | Status: SHIPPED | OUTPATIENT
Start: 2020-03-04 | End: 2020-08-22 | Stop reason: ALTCHOICE

## 2020-03-04 NOTE — DISCHARGE INSTRUCTIONS
Otitis Externa   WHAT YOU NEED TO KNOW:   Otitis externa, or swimmer's ear, is an infection in the outer ear canal  This canal goes from the outside of the ear to the eardrum  DISCHARGE INSTRUCTIONS:   Return to the emergency department if:   · You have severe ear pain  · You are suddenly unable to hear at all  · You have new swelling in your face, behind your ears, or in your neck  · You suddenly cannot move part of your face  · Your face suddenly feels numb  Contact your healthcare provider if:   · You have a fever  · Your signs and symptoms do not get better after 2 days of treatment  · Your signs and symptoms go away for a time, but then come back  · You have questions or concerns about your condition or care  Medicines:   · NSAIDs , such as ibuprofen, help decrease swelling, pain, and fever  This medicine is available with or without a doctor's order  NSAIDs can cause stomach bleeding or kidney problems in certain people  If you take blood thinner medicine, always ask if NSAIDs are safe for you  Always read the medicine label and follow directions  Do not give these medicines to children under 10months of age without direction from your child's healthcare provider  · Acetaminophen  decreases pain and fever  It is available without a doctor's order  Ask how much to take and how often to take it  Follow directions  Acetaminophen can cause liver damage if not taken correctly  · Ear drops  that contain an antibiotic may be given  The antibiotic helps treat a bacterial infection  You may also be given steroid medicine  The steroid helps decrease redness, swelling, and pain  · Take your medicine as directed  Contact your healthcare provider if you think your medicine is not helping or if you have side effects  Tell him or her if you are allergic to any medicine  Keep a list of the medicines, vitamins, and herbs you take   Include the amounts, and when and why you take them  Bring the list or the pill bottles to follow-up visits  Carry your medicine list with you in case of an emergency  Follow up with your healthcare provider as directed:  Write down your questions so you remember to ask them during your visits  How to use eardrops:   · Lie down on your side with your infected ear facing up  · Carefully drip the correct number of eardrops into your ear  Have another person help you if possible  · Gently move the outside part of your ear back and forth to help the medicine reach your ear canal      · Stay lying down in the same position (with your ear facing up) for 3 to 5 minutes  Prevent otitis externa:   · Do not put cotton swabs or foreign objects in your ears  · Wrap a clean moist washcloth around your finger, and use it to clean your outer ear and remove extra ear wax  · Use ear plugs when you swim  Dry your outer ears completely after you swim or bathe  © 2017 [facility] Information is for End User's use only and may not be sold, redistributed or otherwise used for commercial purposes  All illustrations and images included in CareNotes® are the copyrighted property of [employer]  or Alexis Swan  The above information is an  only  It is not intended as medical advice for individual conditions or treatments  Talk to your doctor, nurse or pharmacist before following any medical regimen to see if it is safe and effective for you

## 2020-03-04 NOTE — ED PROVIDER NOTES
History  Chief Complaint   Patient presents with    Earache     pt c/o left ear pain for the past x12; pt denies any drainage; pt denies fevers but does have some nausea; pt denies v/d     Renita Claudio is a 30 y o  female who presents to the ED with complaints of left inner ear pain x 2 days  Patient states she noticed a "gurgling" sensation in the left ear  Denies injury, drainage, bleeding, FB insertion, changes in hearing, nasal congestion, cough, sore throat, chest pain, SOB, fever, chills  No OTC medications taken PTA  Patient states she did have a history of ear infections in childhood  History provided by:  Patient  Earache   Location:  Left  Quality:  Aching  Duration:  2 days  Associated symptoms: no abdominal pain, no congestion, no cough, no diarrhea, no ear discharge, no fever, no headaches, no hearing loss, no neck pain, no rash, no rhinorrhea, no sore throat, no tinnitus and no vomiting        Prior to Admission Medications   Prescriptions Last Dose Informant Patient Reported? Taking? Magnesium 400 MG TABS   No No   Sig: Take 1 tablet (400 mg total) by mouth daily   SUMAtriptan (IMITREX) 100 mg tablet   No No   Sig: Take 1 tablet (100 mg total) by mouth once as needed for migraine for up to 1 dose Can repeat dose in 2 hours  Don't exceed 2 tabs in 1 day   Patient not taking: Reported on 1/23/2020   bisacodyl (FLEET) 10 MG/30ML ENEM   No No   Sig: Insert 30 mL (10 mg total) into the rectum once for 1 dose   gabapentin (NEURONTIN) 300 mg capsule   No No   Sig: Take 1 capsule (300 mg total) by mouth 3 (three) times a day   ibuprofen (MOTRIN) 600 mg tablet   No No   Sig: Take 1 tablet (600 mg total) by mouth every 6 (six) hours as needed for moderate pain   Patient not taking: Reported on 1/23/2020   lactulose 20 g/30 mL   No No   Sig: Take 15 mL (10 g total) by mouth daily as needed (constipation) Titrate as directed     lubiprostone (AMITIZA) 8 mcg capsule   No No   Sig: Take 1 capsule (8 mcg total) by mouth 2 (two) times a day with meals   Patient not taking: Reported on 1/23/2020   methocarbamol (ROBAXIN) 500 mg tablet   No No   Sig: Take 1 tablet (500 mg total) by mouth 3 (three) times a day   Patient not taking: Reported on 1/23/2020   norgestimate-ethinyl estradiol (ORTHO-CYCLEN) 0 25-35 MG-MCG per tablet   Yes No   Sig: Take 1 tablet by mouth daily   pseudoephedrine (SUDAFED) 60 mg tablet   No No   Sig: Take 0 5 tablets (30 mg total) by mouth every 4 (four) hours as needed for congestion   Patient not taking: Reported on 11/21/2019   topiramate (TOPAMAX) 50 MG tablet   No No   Sig: Take 1 tablet (50 mg total) by mouth 2 (two) times a day      Facility-Administered Medications: None       Past Medical History:   Diagnosis Date    Frequent headaches        Past Surgical History:   Procedure Laterality Date    NO PAST SURGERIES         History reviewed  No pertinent family history  I have reviewed and agree with the history as documented  E-Cigarette/Vaping    E-Cigarette Use Never User      E-Cigarette/Vaping Substances     Social History     Tobacco Use    Smoking status: Current Every Day Smoker     Packs/day: 0 25     Types: Cigarettes    Smokeless tobacco: Never Used   Substance Use Topics    Alcohol use: Not Currently    Drug use: No       Review of Systems   Constitutional: Negative for appetite change, chills, fever and unexpected weight change  HENT: Positive for ear pain  Negative for congestion, drooling, ear discharge, hearing loss, rhinorrhea, sore throat, tinnitus, trouble swallowing and voice change  Eyes: Negative for pain, discharge, redness and visual disturbance  Respiratory: Negative for cough, shortness of breath, wheezing and stridor  Cardiovascular: Negative for chest pain, palpitations and leg swelling  Gastrointestinal: Negative for abdominal pain, blood in stool, constipation, diarrhea, nausea and vomiting     Genitourinary: Negative for dysuria, flank pain, frequency, hematuria and urgency  Musculoskeletal: Negative for gait problem, joint swelling, neck pain and neck stiffness  Skin: Negative for color change and rash  Neurological: Negative for dizziness, seizures, light-headedness and headaches  Physical Exam  Physical Exam   Constitutional: She is oriented to person, place, and time  She appears well-developed and well-nourished  HENT:   Head: Normocephalic and atraumatic  Right Ear: Hearing, external ear and ear canal normal  Tympanic membrane is scarred  Tympanic membrane is not perforated, not erythematous, not retracted and not bulging  Left Ear: Hearing, external ear and ear canal normal  Tympanic membrane is scarred and bulging  Tympanic membrane is not perforated, not erythematous and not retracted  Nose: Nose normal    Mouth/Throat: Oropharynx is clear and moist    Erythema and edema of the external canal, no drainage, no bleeding   Eyes: Pupils are equal, round, and reactive to light  Conjunctivae and EOM are normal    Cardiovascular: Normal rate, regular rhythm and intact distal pulses  Pulmonary/Chest: Effort normal and breath sounds normal    Musculoskeletal: Normal range of motion  Neurological: She is alert and oriented to person, place, and time  Skin: Skin is warm and dry  Capillary refill takes less than 2 seconds  Psychiatric: She has a normal mood and affect  Nursing note and vitals reviewed        Vital Signs  ED Triage Vitals [03/04/20 1000]   Temperature Pulse Respirations Blood Pressure SpO2   98 4 °F (36 9 °C) 88 16 114/59 100 %      Temp Source Heart Rate Source Patient Position - Orthostatic VS BP Location FiO2 (%)   Temporal Monitor Sitting Right arm --      Pain Score       8           Vitals:    03/04/20 1000   BP: 114/59   Pulse: 88   Patient Position - Orthostatic VS: Sitting         Visual Acuity      ED Medications  Medications - No data to display    Diagnostic Studies  Results Reviewed     None                 No orders to display              Procedures  Procedures         ED Course  ED Course as of Mar 04 1025   Wed Mar 04, 2020   1025 Educated patient regarding diagnosis and management  Advised patient to follow up with PCP  Advised patient to RTER for persistent or worsening symptoms  MDM  Number of Diagnoses or Management Options  Diagnosis management comments: Patient will be advised to avoid water in the ear and utilized ear drops for suspected otitis externa  Patient was given information for ENT  I provided patient with strict RTER precautions  I advised patient follow-up with PCP in 24-48 hours  Patient verbalized understanding  Amount and/or Complexity of Data Reviewed  Review and summarize past medical records: yes    Patient Progress  Patient progress: improved        Disposition  Final diagnoses:   Left otitis externa   Left otitis media     Time reflects when diagnosis was documented in both MDM as applicable and the Disposition within this note     Time User Action Codes Description Comment    3/4/2020 10:23 AM Angelica [place] Add [H60 92] Left otitis externa     3/4/2020 10:23 AM Angelica Cruz Add [H66 92] Left otitis media       ED Disposition     ED Disposition Condition Date/Time Comment    Discharge Stable Wed Mar 4, 2020 10:23 AM [address] discharge to home/self care              Follow-up Information     Follow up With Specialties Details Why Contact Info Additional [address] [facility] Emergency Department Emergency Medicine Go to  If symptoms worsen [facility] [id number] [phone] [place] ED, [address] [facility] , [address] [place] [address] [facility], Physician Assistant Schedule an appointment as soon as possible for a visit   [address]  [address] [facility] [address]  Lelo Mcfadden 34, DO Otolaryngology Go to  As needed [address]  [address] [facility] [address]  [phone]             Patient's Medications   Discharge Prescriptions    AMOXICILLIN (AMOXIL) 500 MG CAPSULE    Take 1 capsule (500 mg total) by mouth every 12 (twelve) hours for 7 days       Start Date: 3/4/2020  End Date: 3/11/2020       Order Dose: 500 mg       Quantity: 14 capsule    Refills: 0    NEOMYCIN-POLYMYXIN-HYDROCORTISONE (CORTISPORIN) 0 35%-10,000 UNITS/ML-1% OTIC SUSPENSION    Administer 4 drops into the left ear 3 (three) times a day for 7 days       Start Date: 3/4/2020  End Date: 3/11/2020       Order Dose: 4 drops       Quantity: 10 mL    Refills: 0     No discharge procedures on file      PDMP Review     None          ED Provider  Electronically Signed by           Gonzales Edmond PA-C  03/04/20 5711

## 2020-03-07 ENCOUNTER — HOSPITAL ENCOUNTER (EMERGENCY)
Facility: HOSPITAL | Age: 30
Discharge: HOME | End: 2020-03-07
Attending: EMERGENCY MEDICINE | Admitting: EMERGENCY MEDICINE
Payer: COMMERCIAL

## 2020-03-07 VITALS
TEMPERATURE: 98.3 F | OXYGEN SATURATION: 98 % | HEART RATE: 83 BPM | RESPIRATION RATE: 16 BRPM | WEIGHT: 116 LBS | SYSTOLIC BLOOD PRESSURE: 111 MMHG | DIASTOLIC BLOOD PRESSURE: 71 MMHG

## 2020-03-07 DIAGNOSIS — B35.3 TINEA PEDIS: Primary | ICD-10-CM

## 2020-03-07 PROCEDURE — 99282 EMERGENCY DEPT VISIT SF MDM: CPT | Performed by: EMERGENCY MEDICINE

## 2020-03-07 PROCEDURE — 99283 EMERGENCY DEPT VISIT LOW MDM: CPT

## 2020-03-07 RX ORDER — CLOTRIMAZOLE 1 %
CREAM (GRAM) TOPICAL
Qty: 14 G | Refills: 0 | Status: SHIPPED | OUTPATIENT
Start: 2020-03-07 | End: 2020-08-22 | Stop reason: ALTCHOICE

## 2020-03-07 NOTE — ED PROVIDER NOTES
History  Chief Complaint   Patient presents with    Toe Pain     Patient reports right sided discomfort and itchiness between her fifth and fourth toe for one week     30year old female presents for evaluation of right sided interweb toe pain that started 1 week ago  Patient states she has been applying Benadryl and steroid cream with minimal relief  Denies trauma  None       History reviewed  No pertinent past medical history  History reviewed  No pertinent surgical history  History reviewed  No pertinent family history  I have reviewed and agree with the history as documented  E-Cigarette/Vaping     E-Cigarette/Vaping Substances     Social History     Tobacco Use    Smoking status: Current Every Day Smoker     Packs/day: 0 25     Types: Cigarettes    Smokeless tobacco: Never Used   Substance Use Topics    Alcohol use: Not Currently    Drug use: Not Currently       Review of Systems   Skin: Positive for color change and rash  Physical Exam  Physical Exam   Musculoskeletal: Normal range of motion  She exhibits no tenderness  Skin: Skin is warm  There is erythema     Scaly, erythematous rash in between 4th and 5th toes of right foot consistent with fungal infection       Vital Signs  ED Triage Vitals [03/07/20 1215]   Temperature Pulse Respirations Blood Pressure SpO2   98 3 °F (36 8 °C) 83 16 111/71 98 %      Temp Source Heart Rate Source Patient Position - Orthostatic VS BP Location FiO2 (%)   Oral Monitor Sitting Left arm --      Pain Score       --           Vitals:    03/07/20 1215   BP: 111/71   Pulse: 83   Patient Position - Orthostatic VS: Sitting         Visual Acuity      ED Medications  Medications - No data to display    Diagnostic Studies  Results Reviewed     None                 No orders to display              Procedures  Procedures         ED Course                               MDM  Number of Diagnoses or Management Options  Tinea pedis:   Diagnosis management comments: 81-year-old female presenting with tinea pedis  Apply Lotrimin as needed  Follow up with PCP  Disposition  Final diagnoses:   Tinea pedis     Time reflects when diagnosis was documented in both MDM as applicable and the Disposition within this note     Time User Action Codes Description Comment    3/7/2020 12:26 PM Gurwinder Dale Add [B35 3] Tinea pedis       ED Disposition     ED Disposition Condition Date/Time Comment    Discharge Stable Sat Mar 7, 2020 12:27 PM [facility] Rubio discharge to home/self care  Follow-up Information     Follow up With Specialties Details Why Contact Info    Mohan Peck DO Family Medicine In 1 week  [address]  [address] [facility]  [address]  [phone]      [facility] Emergency Department Emergency Medicine  If symptoms worsen [address] [address] [phone]          Discharge Medication List as of 3/7/2020 12:27 PM      START taking these medications    Details   clotrimazole (LOTRIMIN) 1 % cream Apply to affected area 2 times daily until symptoms resolve, Print           No discharge procedures on file      PDMP Review     None          ED Provider  Electronically Signed by           Vicky Tomlinson DO  03/07/20 9448

## 2020-03-16 ENCOUNTER — TELEPHONE (OUTPATIENT)
Dept: NEUROLOGY | Facility: CLINIC | Age: 30
End: 2020-03-16

## 2020-03-16 NOTE — TELEPHONE ENCOUNTER
LMOM for pt to call back if interested in being seen by Dr Alex Paredes next week at the [facility] office  There is open availability Mon, Tues and Wed

## 2020-03-26 ENCOUNTER — TELEPHONE (OUTPATIENT)
Dept: SLEEP CENTER | Facility: CLINIC | Age: 30
End: 2020-03-26

## 2020-04-15 ENCOUNTER — TELEPHONE (OUTPATIENT)
Dept: NEUROLOGY | Facility: CLINIC | Age: 30
End: 2020-04-15

## 2020-04-21 ENCOUNTER — TELEPHONE (OUTPATIENT)
Dept: NEUROLOGY | Facility: CLINIC | Age: 30
End: 2020-04-21

## 2020-04-23 ENCOUNTER — TELEPHONE (OUTPATIENT)
Dept: NEUROLOGY | Facility: CLINIC | Age: 30
End: 2020-04-23

## 2020-06-04 ENCOUNTER — TELEPHONE (OUTPATIENT)
Dept: NEUROLOGY | Facility: CLINIC | Age: 30
End: 2020-06-04

## 2020-08-22 ENCOUNTER — HOSPITAL ENCOUNTER (EMERGENCY)
Facility: HOSPITAL | Age: 30
Discharge: HOME | End: 2020-08-22
Attending: EMERGENCY MEDICINE | Admitting: EMERGENCY MEDICINE
Payer: COMMERCIAL

## 2020-08-22 VITALS
WEIGHT: 135 LBS | TEMPERATURE: 97.4 F | SYSTOLIC BLOOD PRESSURE: 127 MMHG | DIASTOLIC BLOOD PRESSURE: 76 MMHG | HEART RATE: 97 BPM | BODY MASS INDEX: 28.22 KG/M2 | RESPIRATION RATE: 18 BRPM | OXYGEN SATURATION: 97 %

## 2020-08-22 DIAGNOSIS — J02.0 STREP PHARYNGITIS: Primary | ICD-10-CM

## 2020-08-22 LAB
ALBUMIN SERPL BCP-MCNC: 4.7 G/DL (ref 3–5.2)
ALP SERPL-CCNC: 51 U/L (ref 43–122)
ALT SERPL W P-5'-P-CCNC: 26 U/L (ref 9–52)
ANION GAP SERPL CALCULATED.3IONS-SCNC: 9 MMOL/L (ref 5–14)
AST SERPL W P-5'-P-CCNC: 24 U/L (ref 14–36)
BASOPHILS # BLD AUTO: 0 THOUSANDS/ΜL (ref 0–0.1)
BASOPHILS NFR BLD AUTO: 0 % (ref 0–1)
BILIRUB SERPL-MCNC: 0.6 MG/DL
BUN SERPL-MCNC: 10 MG/DL (ref 5–25)
CALCIUM SERPL-MCNC: 9.8 MG/DL (ref 8.4–10.2)
CHLORIDE SERPL-SCNC: 104 MMOL/L (ref 97–108)
CO2 SERPL-SCNC: 23 MMOL/L (ref 22–30)
CREAT SERPL-MCNC: 0.73 MG/DL (ref 0.6–1.2)
EOSINOPHIL # BLD AUTO: 0.1 THOUSAND/ΜL (ref 0–0.4)
EOSINOPHIL NFR BLD AUTO: 1 % (ref 0–6)
ERYTHROCYTE [DISTWIDTH] IN BLOOD BY AUTOMATED COUNT: 12.9 %
EXT PREG TEST URINE: NEGATIVE
EXT. CONTROL ED NAV: NORMAL
GFR SERPL CREATININE-BSD FRML MDRD: 112 ML/MIN/1.73SQ M
GLUCOSE SERPL-MCNC: 89 MG/DL (ref 70–99)
HCT VFR BLD AUTO: 41 % (ref 36–46)
HGB BLD-MCNC: 14.2 G/DL (ref 12–16)
LYMPHOCYTES # BLD AUTO: 0.7 THOUSANDS/ΜL (ref 0.5–4)
LYMPHOCYTES NFR BLD AUTO: 9 % (ref 25–45)
MCH RBC QN AUTO: 30.4 PG (ref 26–34)
MCHC RBC AUTO-ENTMCNC: 34.7 G/DL (ref 31–36)
MCV RBC AUTO: 88 FL (ref 80–100)
MONOCYTES # BLD AUTO: 0.8 THOUSAND/ΜL (ref 0.2–0.9)
MONOCYTES NFR BLD AUTO: 10 % (ref 1–10)
NEUTROPHILS # BLD AUTO: 6.6 THOUSANDS/ΜL (ref 1.8–7.8)
NEUTS SEG NFR BLD AUTO: 81 % (ref 45–65)
PLATELET # BLD AUTO: 177 THOUSANDS/UL (ref 150–450)
PMV BLD AUTO: 9.2 FL (ref 8.9–12.7)
POTASSIUM SERPL-SCNC: 4.5 MMOL/L (ref 3.6–5)
PROT SERPL-MCNC: 7.9 G/DL (ref 5.9–8.4)
RBC # BLD AUTO: 4.68 MILLION/UL (ref 4–5.2)
S PYO DNA THROAT QL NAA+PROBE: NORMAL
SODIUM SERPL-SCNC: 136 MMOL/L (ref 137–147)
WBC # BLD AUTO: 8.2 THOUSAND/UL (ref 4.5–11)

## 2020-08-22 PROCEDURE — 80053 COMPREHEN METABOLIC PANEL: CPT | Performed by: PHYSICIAN ASSISTANT

## 2020-08-22 PROCEDURE — 36415 COLL VENOUS BLD VENIPUNCTURE: CPT | Performed by: PHYSICIAN ASSISTANT

## 2020-08-22 PROCEDURE — 81025 URINE PREGNANCY TEST: CPT | Performed by: PHYSICIAN ASSISTANT

## 2020-08-22 PROCEDURE — 85025 COMPLETE CBC W/AUTO DIFF WBC: CPT | Performed by: PHYSICIAN ASSISTANT

## 2020-08-22 PROCEDURE — 87651 STREP A DNA AMP PROBE: CPT | Performed by: PHYSICIAN ASSISTANT

## 2020-08-22 PROCEDURE — 96374 THER/PROPH/DIAG INJ IV PUSH: CPT

## 2020-08-22 PROCEDURE — 99284 EMERGENCY DEPT VISIT MOD MDM: CPT | Performed by: PHYSICIAN ASSISTANT

## 2020-08-22 PROCEDURE — 99283 EMERGENCY DEPT VISIT LOW MDM: CPT

## 2020-08-22 PROCEDURE — 96375 TX/PRO/DX INJ NEW DRUG ADDON: CPT

## 2020-08-22 RX ORDER — NAPROXEN 500 MG/1
500 TABLET ORAL 2 TIMES DAILY WITH MEALS
Qty: 20 TABLET | Refills: 0 | Status: SHIPPED | OUTPATIENT
Start: 2020-08-22 | End: 2020-09-24

## 2020-08-22 RX ORDER — DEXAMETHASONE SODIUM PHOSPHATE 4 MG/ML
10 INJECTION, SOLUTION INTRA-ARTICULAR; INTRALESIONAL; INTRAMUSCULAR; INTRAVENOUS; SOFT TISSUE ONCE
Status: COMPLETED | OUTPATIENT
Start: 2020-08-22 | End: 2020-08-22

## 2020-08-22 RX ORDER — ACETAMINOPHEN 500 MG
500 TABLET ORAL EVERY 6 HOURS PRN
Qty: 30 TABLET | Refills: 0 | Status: SHIPPED | OUTPATIENT
Start: 2020-08-22 | End: 2020-10-30 | Stop reason: ALTCHOICE

## 2020-08-22 RX ORDER — ONDANSETRON 2 MG/ML
4 INJECTION INTRAMUSCULAR; INTRAVENOUS ONCE AS NEEDED
Status: DISCONTINUED | OUTPATIENT
Start: 2020-08-22 | End: 2020-08-22 | Stop reason: HOSPADM

## 2020-08-22 RX ORDER — KETOROLAC TROMETHAMINE 30 MG/ML
15 INJECTION, SOLUTION INTRAMUSCULAR; INTRAVENOUS ONCE
Status: COMPLETED | OUTPATIENT
Start: 2020-08-22 | End: 2020-08-22

## 2020-08-22 RX ADMIN — KETOROLAC TROMETHAMINE 15 MG: 30 INJECTION, SOLUTION INTRAMUSCULAR at 18:32

## 2020-08-22 RX ADMIN — DEXAMETHASONE SODIUM PHOSPHATE 10 MG: 4 INJECTION, SOLUTION INTRAMUSCULAR; INTRAVENOUS at 18:37

## 2020-08-22 NOTE — ED PROVIDER NOTES
History  Chief Complaint   Patient presents with    Sore Throat     Patient reports sore throat and headache for 3 days and 1 episode of vomiting this AM       Patient is a 81-year-old female presents today for evaluation of 2 days of sore throat worsening associated with a headache and 1 episode of vomiting which occurred this morning  Patient denies any measured fevers at home  Patient reports she has not had any contact with any body who has been sick no COVID-19 exposures and denies any travel outside the area to [facility]  Patient denies any difficulty swallowing or difficulty managing oral secretions  Patient denies any neck stiffness neck rigidity or difficulty turning her head  Patient reports the headache is frontal in nature aching with no numbness tingling or focal neural deficits  Patient reports he has not taken any medications to alleviate her symptoms  Patient denies coughing, chest pain, shortness of breath or abdominal pain associated with her symptoms  History provided by:  Patient  Sore Throat   Location:  Generalized  Quality:  Aching and sore  Severity:  Mild  Onset quality:  Gradual  Duration:  2 days  Timing:  Constant  Progression:  Unchanged  Chronicity:  New  Relieved by:  None tried  Worsened by:  Nothing  Ineffective treatments:  None tried  Associated symptoms: no abdominal pain, no chest pain, no chills, no ear pain, no fever, no rash, no rhinorrhea and no shortness of breath        Prior to Admission Medications   Prescriptions Last Dose Informant Patient Reported? Taking?   gabapentin (NEURONTIN) 300 mg capsule 8/22/2020 at Unknown time  No Yes   Sig: Take 1 capsule (300 mg total) by mouth 3 (three) times a day      Facility-Administered Medications: None       Past Medical History:   Diagnosis Date    Frequent headaches        Past Surgical History:   Procedure Laterality Date    NO PAST SURGERIES         History reviewed  No pertinent family history    I have reviewed and agree with the history as documented  E-Cigarette/Vaping    E-Cigarette Use Never User      E-Cigarette/Vaping Substances     Social History     Tobacco Use    Smoking status: Current Every Day Smoker     Packs/day: 0 25     Types: Cigarettes    Smokeless tobacco: Never Used   Substance Use Topics    Alcohol use: Not Currently    Drug use: Not Currently       Review of Systems   Constitutional: Negative for chills, fatigue and fever  HENT: Positive for sore throat  Negative for congestion, ear pain and rhinorrhea  Eyes: Negative for redness  Respiratory: Negative for chest tightness and shortness of breath  Cardiovascular: Negative for chest pain and palpitations  Gastrointestinal: Positive for nausea and vomiting  Negative for abdominal pain  Genitourinary: Negative for dysuria and hematuria  Musculoskeletal: Negative  Skin: Negative for rash  Neurological: Negative for dizziness, syncope, light-headedness and numbness  Physical Exam  Physical Exam  Vitals signs and nursing note reviewed  Constitutional:       Appearance: She is well-developed  HENT:      Head: Normocephalic  Mouth/Throat:      Pharynx: Uvula midline  Posterior oropharyngeal erythema present  Tonsils: No tonsillar exudate  3+ on the right  3+ on the left  Eyes:      General: No scleral icterus  Cardiovascular:      Rate and Rhythm: Normal rate and regular rhythm  Pulmonary:      Effort: Pulmonary effort is normal       Breath sounds: Normal breath sounds  No stridor  Abdominal:      General: There is no distension  Palpations: Abdomen is soft  Tenderness: There is no abdominal tenderness  Musculoskeletal: Normal range of motion  Lymphadenopathy:      Cervical: No cervical adenopathy  Skin:     General: Skin is warm and dry  Capillary Refill: Capillary refill takes less than 2 seconds     Neurological:      Mental Status: She is alert and oriented to person, place, and time           Vital Signs  ED Triage Vitals   Temperature Pulse Respirations Blood Pressure SpO2   08/22/20 1753 08/22/20 1753 08/22/20 1753 08/22/20 1753 08/22/20 1753   (!) 97 4 °F (36 3 °C) 97 18 127/76 97 %      Temp Source Heart Rate Source Patient Position - Orthostatic VS BP Location FiO2 (%)   08/22/20 1753 08/22/20 1753 08/22/20 1753 08/22/20 1753 --   Tympanic Monitor Sitting Left arm       Pain Score       08/22/20 1832       9           Vitals:    08/22/20 1753   BP: 127/76   Pulse: 97   Patient Position - Orthostatic VS: Sitting         Visual Acuity      ED Medications  Medications   ondansetron (ZOFRAN) injection 4 mg (has no administration in time range)   ketorolac (TORADOL) injection 15 mg (15 mg Intravenous Given 8/22/20 1832)   dexamethasone (DECADRON) injection 10 mg (10 mg Intravenous Given 8/22/20 1837)       Diagnostic Studies  Results Reviewed     Procedure Component Value Units Date/Time    Strep A PCR [[id number]]  (Normal) Collected:  08/22/20 1831    Lab Status:  Final result Specimen:  Throat Updated:  08/22/20 1911     STREP A PCR None Detected    Comprehensive metabolic panel [[id number]]  (Abnormal) Collected:  08/22/20 1831    Lab Status:  Final result Specimen:  Blood from Arm, Right Updated:  08/22/20 1848     Sodium 136 mmol/L      Potassium 4 5 mmol/L      Chloride 104 mmol/L      CO2 23 mmol/L      ANION GAP 9 mmol/L      BUN 10 mg/dL      Creatinine 0 73 mg/dL      Glucose 89 mg/dL      Calcium 9 8 mg/dL      AST 24 U/L      ALT 26 U/L      Alkaline Phosphatase 51 U/L      Total Protein 7 9 g/dL      Albumin 4 7 g/dL      Total Bilirubin 0 60 mg/dL      eGFR 112 ml/min/1 73sq m     Narrative:       [place] guidelines for Chronic Kidney Disease (CKD):     Stage 1 with normal or high GFR (GFR > 90 mL/min/1 73 square meters)    Stage 2 Mild CKD (GFR = 60-89 mL/min/1 73 square meters)    Stage 3A Moderate CKD (GFR = 45-59 mL/min/1 73 square meters)    Stage 3B Moderate CKD (GFR = 30-44 mL/min/1 73 square meters)    Stage 4 Severe CKD (GFR = 15-29 mL/min/1 73 square meters)    Stage 5 End Stage CKD (GFR <15 mL/min/1 73 square meters)  Note: GFR calculation is accurate only with a steady state creatinine    CBC and differential [[id number]]  (Abnormal) Collected:  08/22/20 1831    Lab Status:  Final result Specimen:  Blood from Arm, Right Updated:  08/22/20 1840     WBC 8 20 Thousand/uL      RBC 4 68 Million/uL      Hemoglobin 14 2 g/dL      Hematocrit 41 0 %      MCV 88 fL      MCH 30 4 pg      MCHC 34 7 g/dL      RDW 12 9 %      MPV 9 2 fL      Platelets 630 Thousands/uL      Neutrophils Relative 81 %      Lymphocytes Relative 9 %      Monocytes Relative 10 %      Eosinophils Relative 1 %      Basophils Relative 0 %      Neutrophils Absolute 6 60 Thousands/µL      Lymphocytes Absolute 0 70 Thousands/µL      Monocytes Absolute 0 80 Thousand/µL      Eosinophils Absolute 0 10 Thousand/µL      Basophils Absolute 0 00 Thousands/µL     POCT pregnancy, urine [[id number]]  (Normal) Resulted:  08/22/20 1805    Lab Status:  Final result Updated:  08/22/20 1835     EXT PREG TEST UR (Ref: Negative) negative     Control valid                 No orders to display              Procedures  Procedures         ED Course  ED Course as of Aug 22 1930   Sat Aug 22, 2020   1919 Patient was reexamined at this time and informed of laboratory and/or imaging results and was found to be stable for discharge  Return to emergency department criteria was reviewed with the patient who verbalized understanding and was agreeable to discharge and the treatment plan at this time  STREP A PCR: None Detected       US AUDIT      Most Recent Value   Initial Alcohol Screen: US AUDIT-C    1  How often do you have a drink containing alcohol?  0 Filed at: 08/22/2020 1753   2  How many drinks containing alcohol do you have on a typical day you are drinking?    0 Filed at: 08/22/2020 1756   3a  Male UNDER 65: How often do you have five or more drinks on one occasion? 0 Filed at: 08/22/2020 1756   3b  FEMALE Any Age, or MALE 65+: How often do you have 4 or more drinks on one occassion? 0 Filed at: 08/22/2020 1756   Audit-C Score  0 Filed at: 08/22/2020 1756                  MICHELLE/DAST-10      Most Recent Value   How many times in the past year have you    Used an illegal drug or used a prescription medication for non-medical reasons? Never Filed at: 08/22/2020 1756                                MDM  Number of Diagnoses or Management Options  Diagnosis management comments: All imaging and/or lab testing discussed with patient, strict return to ED precautions discussed  Patient recommended to follow up promptly with appropriate outpatient provider  Patient and/or family members verbalizes understanding and agrees with plan  Patient is stable for discharge      Portions of the record may have been created with voice recognition software  Occasional wrong word or "sound a like" substitutions may have occurred due to the inherent limitations of voice recognition software  Read the chart carefully and recognize, using context, where substitutions have occurred  Disposition  Final diagnoses:   Strep pharyngitis     Time reflects when diagnosis was documented in both MDM as applicable and the Disposition within this note     Time User Action Codes Description Comment    8/22/2020  7:23 PM Meghana Gonzales [J02 0] Strep pharyngitis       ED Disposition     ED Disposition Condition Date/Time Comment    Discharge Stable Sat Aug 22, 2020  7:23 PM [address] discharge to home/self care              Follow-up Information     Follow up With Specialties Details Why Contact Info    Marcos Yan MD Family Medicine Schedule an appointment as soon as possible for a visit   [address] [address]             Patient's Medications   Discharge Prescriptions    ACETAMINOPHEN (TYLENOL) 500 MG TABLET    Take 1 tablet (500 mg total) by mouth every 6 (six) hours as needed (pain)       Start Date: 8/22/2020 End Date: --       Order Dose: 500 mg       Quantity: 30 tablet    Refills: 0    MENTHOL-CETYLPYRIDINIUM (CEPACOL) 3 MG LOZENGE    Take 1 lozenge (3 mg total) by mouth as needed for sore throat       Start Date: 8/22/2020 End Date: --       Order Dose: 3 mg       Quantity: 30 lozenge    Refills: 0    NAPROXEN (EC NAPROSYN) 500 MG EC TABLET    Take 1 tablet (500 mg total) by mouth 2 (two) times a day with meals       Start Date: 8/22/2020 End Date: 8/22/2021       Order Dose: 500 mg       Quantity: 20 tablet    Refills: 0     No discharge procedures on file      PDMP Review     None          ED Provider  Electronically Signed by           Helen Stiles PA-C  08[facility]22/20 [facility]  08/22/20 9749

## 2020-08-22 NOTE — Clinical Note
Ammy Soto was seen and treated in our emergency department on 8/22/2020  Diagnosis:     Citlali Jimenez  may return to work on return date  She may return on this date: 08/24/2020         If you have any questions or concerns, please don't hesitate to call        Tracy Mitchell PA-C    ______________________________           _______________          _______________  Hospital Representative                              Date                                Time

## 2020-08-29 ENCOUNTER — APPOINTMENT (EMERGENCY)
Dept: CT IMAGING | Facility: HOSPITAL | Age: 30
End: 2020-08-29
Payer: COMMERCIAL

## 2020-08-29 ENCOUNTER — HOSPITAL ENCOUNTER (EMERGENCY)
Facility: HOSPITAL | Age: 30
Discharge: HOME | End: 2020-08-29
Attending: EMERGENCY MEDICINE | Admitting: EMERGENCY MEDICINE
Payer: COMMERCIAL

## 2020-08-29 VITALS
TEMPERATURE: 97.5 F | BODY MASS INDEX: 28.44 KG/M2 | WEIGHT: 136.1 LBS | SYSTOLIC BLOOD PRESSURE: 95 MMHG | DIASTOLIC BLOOD PRESSURE: 54 MMHG | RESPIRATION RATE: 16 BRPM | HEART RATE: 81 BPM | OXYGEN SATURATION: 97 %

## 2020-08-29 DIAGNOSIS — K31.84 GASTROPARESIS: ICD-10-CM

## 2020-08-29 DIAGNOSIS — N94.6 DYSMENORRHEA: Primary | ICD-10-CM

## 2020-08-29 DIAGNOSIS — N83.8 RIGHT OVARIAN ENLARGEMENT: ICD-10-CM

## 2020-08-29 DIAGNOSIS — K59.00 CONSTIPATION, UNSPECIFIED CONSTIPATION TYPE: ICD-10-CM

## 2020-08-29 LAB
ALBUMIN SERPL BCP-MCNC: 4.5 G/DL (ref 3–5.2)
ALP SERPL-CCNC: 63 U/L (ref 43–122)
ALT SERPL W P-5'-P-CCNC: 17 U/L (ref 9–52)
ANION GAP SERPL CALCULATED.3IONS-SCNC: 12 MMOL/L (ref 5–14)
AST SERPL W P-5'-P-CCNC: 24 U/L (ref 14–36)
BASOPHILS # BLD AUTO: 0 THOUSANDS/ΜL (ref 0–0.1)
BASOPHILS NFR BLD AUTO: 0 % (ref 0–1)
BILIRUB SERPL-MCNC: 0.2 MG/DL
BILIRUB UR QL STRIP: NEGATIVE
BUN SERPL-MCNC: 7 MG/DL (ref 5–25)
CALCIUM SERPL-MCNC: 9.2 MG/DL (ref 8.4–10.2)
CHLORIDE SERPL-SCNC: 106 MMOL/L (ref 97–108)
CLARITY UR: CLEAR
CO2 SERPL-SCNC: 21 MMOL/L (ref 22–30)
COLOR UR: YELLOW
CREAT SERPL-MCNC: 0.6 MG/DL (ref 0.6–1.2)
EOSINOPHIL # BLD AUTO: 0.1 THOUSAND/ΜL (ref 0–0.4)
EOSINOPHIL NFR BLD AUTO: 2 % (ref 0–6)
ERYTHROCYTE [DISTWIDTH] IN BLOOD BY AUTOMATED COUNT: 13 %
EXT PREG TEST URINE: NEGATIVE
EXT. CONTROL ED NAV: NORMAL
GFR SERPL CREATININE-BSD FRML MDRD: 124 ML/MIN/1.73SQ M
GLUCOSE SERPL-MCNC: 124 MG/DL (ref 70–99)
GLUCOSE UR STRIP-MCNC: NEGATIVE MG/DL
HCT VFR BLD AUTO: 42.2 % (ref 36–46)
HGB BLD-MCNC: 14.4 G/DL (ref 12–16)
HGB UR QL STRIP.AUTO: NEGATIVE
KETONES UR STRIP-MCNC: NEGATIVE MG/DL
LEUKOCYTE ESTERASE UR QL STRIP: NEGATIVE
LYMPHOCYTES # BLD AUTO: 1.9 THOUSANDS/ΜL (ref 0.5–4)
LYMPHOCYTES NFR BLD AUTO: 24 % (ref 25–45)
MCH RBC QN AUTO: 29.9 PG (ref 26–34)
MCHC RBC AUTO-ENTMCNC: 34.2 G/DL (ref 31–36)
MCV RBC AUTO: 88 FL (ref 80–100)
MONOCYTES # BLD AUTO: 0.6 THOUSAND/ΜL (ref 0.2–0.9)
MONOCYTES NFR BLD AUTO: 8 % (ref 1–10)
NEUTROPHILS # BLD AUTO: 5.4 THOUSANDS/ΜL (ref 1.8–7.8)
NEUTS SEG NFR BLD AUTO: 67 % (ref 45–65)
NITRITE UR QL STRIP: NEGATIVE
PH UR STRIP.AUTO: 5 [PH]
PLATELET # BLD AUTO: 231 THOUSANDS/UL (ref 150–450)
PMV BLD AUTO: 9.2 FL (ref 8.9–12.7)
POTASSIUM SERPL-SCNC: 3.4 MMOL/L (ref 3.6–5)
PROT SERPL-MCNC: 7.8 G/DL (ref 5.9–8.4)
PROT UR STRIP-MCNC: NEGATIVE MG/DL
RBC # BLD AUTO: 4.82 MILLION/UL (ref 4–5.2)
SODIUM SERPL-SCNC: 139 MMOL/L (ref 137–147)
SP GR UR STRIP.AUTO: 1.02 (ref 1–1.04)
UROBILINOGEN UA: NEGATIVE MG/DL
WBC # BLD AUTO: 8.1 THOUSAND/UL (ref 4.5–11)

## 2020-08-29 PROCEDURE — G1004 CDSM NDSC: HCPCS

## 2020-08-29 PROCEDURE — 74177 CT ABD & PELVIS W/CONTRAST: CPT

## 2020-08-29 PROCEDURE — 85025 COMPLETE CBC W/AUTO DIFF WBC: CPT | Performed by: PHYSICIAN ASSISTANT

## 2020-08-29 PROCEDURE — 81025 URINE PREGNANCY TEST: CPT | Performed by: PHYSICIAN ASSISTANT

## 2020-08-29 PROCEDURE — 99284 EMERGENCY DEPT VISIT MOD MDM: CPT

## 2020-08-29 PROCEDURE — 99284 EMERGENCY DEPT VISIT MOD MDM: CPT | Performed by: EMERGENCY MEDICINE

## 2020-08-29 PROCEDURE — 96372 THER/PROPH/DIAG INJ SC/IM: CPT

## 2020-08-29 PROCEDURE — 36415 COLL VENOUS BLD VENIPUNCTURE: CPT | Performed by: PHYSICIAN ASSISTANT

## 2020-08-29 PROCEDURE — 80053 COMPREHEN METABOLIC PANEL: CPT | Performed by: PHYSICIAN ASSISTANT

## 2020-08-29 PROCEDURE — 96360 HYDRATION IV INFUSION INIT: CPT

## 2020-08-29 PROCEDURE — 81003 URINALYSIS AUTO W/O SCOPE: CPT | Performed by: PHYSICIAN ASSISTANT

## 2020-08-29 RX ORDER — NAPROXEN 500 MG/1
500 TABLET ORAL 2 TIMES DAILY WITH MEALS
Qty: 30 TABLET | Refills: 0 | Status: SHIPPED | OUTPATIENT
Start: 2020-08-29 | End: 2020-09-24 | Stop reason: SDUPTHER

## 2020-08-29 RX ORDER — ACETAMINOPHEN 325 MG/1
975 TABLET ORAL ONCE
Status: COMPLETED | OUTPATIENT
Start: 2020-08-29 | End: 2020-08-29

## 2020-08-29 RX ORDER — DICYCLOMINE HCL 20 MG
20 TABLET ORAL ONCE
Status: COMPLETED | OUTPATIENT
Start: 2020-08-29 | End: 2020-08-29

## 2020-08-29 RX ORDER — POLYETHYLENE GLYCOL 3350 17 G/17G
17 POWDER, FOR SOLUTION ORAL DAILY
Qty: 225 G | Refills: 0 | Status: SHIPPED | OUTPATIENT
Start: 2020-08-29 | End: 2020-09-24 | Stop reason: SDUPTHER

## 2020-08-29 RX ORDER — KETOROLAC TROMETHAMINE 30 MG/ML
15 INJECTION, SOLUTION INTRAMUSCULAR; INTRAVENOUS ONCE
Status: COMPLETED | OUTPATIENT
Start: 2020-08-29 | End: 2020-08-29

## 2020-08-29 RX ORDER — SENNOSIDES 8.6 MG
1 TABLET ORAL
Qty: 120 EACH | Refills: 0 | Status: SHIPPED | OUTPATIENT
Start: 2020-08-29 | End: 2020-10-30 | Stop reason: ALTCHOICE

## 2020-08-29 RX ADMIN — SODIUM CHLORIDE 1000 ML: 0.9 INJECTION, SOLUTION INTRAVENOUS at 05:41

## 2020-08-29 RX ADMIN — ACETAMINOPHEN 975 MG: 325 TABLET ORAL at 05:40

## 2020-08-29 RX ADMIN — DICYCLOMINE HYDROCHLORIDE 20 MG: 20 TABLET ORAL at 05:02

## 2020-08-29 RX ADMIN — IOHEXOL 100 ML: 350 INJECTION, SOLUTION INTRAVENOUS at 05:36

## 2020-08-29 RX ADMIN — KETOROLAC TROMETHAMINE 15 MG: 30 INJECTION, SOLUTION INTRAMUSCULAR; INTRAVENOUS at 04:38

## 2020-08-29 NOTE — DISCHARGE INSTRUCTIONS
OR SURE YOU FOLLOW-UP WITH YOUR GYNECOLOGIST REGARDING YOUR 5 CM RIGHT OVARIAN LESION  YOU DO NEEDED ULTRASOUND AS DISCUSSED    2  YOUR CT SCAN DID READ POSSIBLE GASTROPARESIS WHICH MEANS SOAPS LOWER DIGESTION  YOU DO NOT HAVE A URINARY INFECTION    Return to emergency room if symptoms worsen, develop new symptoms, or have concern about progress of your condition  Take all medication as directed  Contact your primary care provider in 48 hours for evaluation of the established treatment plan and discussion on the progress of your condition

## 2020-08-29 NOTE — ED CARE HANDOFF
Emergency Department Sign Out Note        Sign out and transfer of care from [facility], [facility]  See Separate Emergency Department note  The patient, Faye Lombardi, was evaluated by the previous provider for abdominal pain  Workup Completed:  Labs, UA, CT    ED Course / Workup Pending (followup):  Pending CT results           patient is a 60-year-old female came in for suprapubic pain with diffuse abdominal lower discomfort  Patient had a UA that was negative pregnancy negative  Labs are stable  At this time pending CT read           6:29 AM  CT scans reveal a 5 cm right adnexa are lesion compatible fracture of mild complex cyst   There is also bladder wall thickening and massively distended stomach could be related to nonfasting state of the patient  On review of chart patient does not have any UA findings consistent with UTI  Patient is resting in bed in no acute distress  She does have mild suprapubic tenderness as well as right lower quadrant tenderness but no rebound or guarding  No peritoneal signs  EOMI  PERRLA  No respiratory distress  Cranial nerves 2-12 grossly intact  Long discussion with patient regarding need for follow-up with gynecologist for ultrasound in the next 1-2 weeks regarding her right ovarian cyst   She is agreeable for such  Also understands that bladder wall thickening was found on CT without any evidence of UTI  Also discussed with her regarding possible gastroparesis             Procedures  MDM    Disposition  Final diagnoses:   Dysmenorrhea   Constipation, unspecified constipation type     Time reflects when diagnosis was documented in both MDM as applicable and the Disposition within this note     Time User Action Codes Description Comment    8/29/2020  5:42 AM Ruy [place] Add [N94 6] Dysmenorrhea     8/29/2020  5:42 AM Ruy [place] Add [K59 00] Constipation, unspecified constipation type       ED Disposition     ED Disposition Condition Date/Time Comment    Discharge Stable Sat Aug 29, 2020  4:28 AM [address] discharge to home/self care  Follow-up Information     Follow up With Specialties Details Why [facility] Call   [address] [facility]  [phone]          Patient's Medications   Discharge Prescriptions    NAPROXEN (NAPROSYN) 500 MG TABLET    Take 1 tablet (500 mg total) by mouth 2 (two) times a day with meals       Start Date: 8/29/2020 End Date: --       Order Dose: 500 mg       Quantity: 30 tablet    Refills: 0    POLYETHYLENE GLYCOL (GLYCOLAX) 17 GM/SCOOP POWDER    Take 17 g by mouth daily       Start Date: 8/29/2020 End Date: --       Order Dose: 17 g       Quantity: 225 g    Refills: 0    SENNA (SENOKOT) 8 6 MG    Take 1 tablet (8 6 mg total) by mouth daily at bedtime       Start Date: 8/29/2020 End Date: --       Order Dose: 8 6 mg       Quantity: 120 each    Refills: 0     No discharge procedures on file         ED Provider  Electronically Signed by     Janis Hays DO  08/29/20 9651

## 2020-08-29 NOTE — ED PROVIDER NOTES
History  Chief Complaint   Patient presents with    Abdominal Pain     pt states that she woke up with lower abd/pelvic pain  pt states that it ia sharp and travels into her back  pt states that it feels like it in her ovaries  pt denies any fever or chills  pt denies any burning or pain with urination  pt denies any abnormal discharge or bleeding  pt states that her last BM was yesterday      34 yo female presents to the ED for suprapubic tenderness that radiates to the bilateral flanks x 1 hour  Pain is described as aching and intermittent  Patient reports she was attempting to fall asleep when she began noticing her pain  Denies any other associated symptoms such as nausea, vomiting, diarrhea, change in bowel and bladder function, vagina discharge, fever, chills, and other complaints  Last meal intake at 11 p m, chinese food  Last menses one month ago, reports that she is "days" away from start of next menstrual cycle  History provided by:  Patient   used: No    Abdominal Pain   Pain location:  Suprapubic  Pain quality: aching    Pain radiates to:  L flank and R flank  Pain severity:  Mild  Onset quality:  Sudden  Duration:  1 hour  Timing:  Constant  Chronicity:  New  Ineffective treatments:  None tried  Associated symptoms: no belching, no chest pain, no chills, no constipation, no cough, no diarrhea, no fatigue, no fever, no hematemesis, no hematochezia, no hematuria, no melena, no nausea, no shortness of breath, no sore throat, no vaginal bleeding, no vaginal discharge and no vomiting        Prior to Admission Medications   Prescriptions Last Dose Informant Patient Reported?  Taking?   acetaminophen (TYLENOL) 500 mg tablet   No No   Sig: Take 1 tablet (500 mg total) by mouth every 6 (six) hours as needed (pain)   gabapentin (NEURONTIN) 300 mg capsule   No No   Sig: Take 1 capsule (300 mg total) by mouth 3 (three) times a day   menthol-cetylpyridinium (CEPACOL) 3 MG lozenge   No No Sig: Take 1 lozenge (3 mg total) by mouth as needed for sore throat   naproxen (EC NAPROSYN) 500 MG EC tablet   No No   Sig: Take 1 tablet (500 mg total) by mouth 2 (two) times a day with meals      Facility-Administered Medications: None       Past Medical History:   Diagnosis Date    Frequent headaches        Past Surgical History:   Procedure Laterality Date    NO PAST SURGERIES         History reviewed  No pertinent family history  I have reviewed and agree with the history as documented  E-Cigarette/Vaping    E-Cigarette Use Never User      E-Cigarette/Vaping Substances     Social History     Tobacco Use    Smoking status: Current Every Day Smoker     Packs/day: 0 25     Types: Cigarettes    Smokeless tobacco: Never Used   Substance Use Topics    Alcohol use: Yes     Comment: occa    Drug use: Not Currently       Review of Systems   Constitutional: Negative for chills, diaphoresis, fatigue and fever  HENT: Negative for congestion, ear pain, rhinorrhea and sore throat  Eyes: Negative for pain  Respiratory: Negative for cough and shortness of breath  Cardiovascular: Negative for chest pain and palpitations  Gastrointestinal: Positive for abdominal pain  Negative for abdominal distention, blood in stool, constipation, diarrhea, hematemesis, hematochezia, melena, nausea and vomiting  Genitourinary: Positive for flank pain (b/l, radiating from suprapubic region )  Negative for hematuria, vaginal bleeding and vaginal discharge  Musculoskeletal: Negative for back pain  Skin: Negative for rash and wound  Allergic/Immunologic: Negative for immunocompromised state  Neurological: Negative for dizziness, weakness, light-headedness, numbness and headaches  Hematological: Negative for adenopathy  Physical Exam  Physical Exam  Vitals signs and nursing note reviewed  Constitutional:       General: She is not in acute distress  Appearance: She is well-developed and normal weight  She is not diaphoretic  HENT:      Head: Normocephalic and atraumatic  Right Ear: External ear normal       Left Ear: External ear normal       Nose: Nose normal       Mouth/Throat:      Mouth: Mucous membranes are moist       Pharynx: No pharyngeal swelling or oropharyngeal exudate  Eyes:      General: No scleral icterus  Right eye: No discharge  Left eye: No discharge  Extraocular Movements: Extraocular movements intact  Conjunctiva/sclera: Conjunctivae normal       Pupils: Pupils are equal, round, and reactive to light  Neck:      Musculoskeletal: Normal range of motion and neck supple  Cardiovascular:      Rate and Rhythm: Normal rate and regular rhythm  Heart sounds: Normal heart sounds  No murmur  Pulmonary:      Effort: Pulmonary effort is normal  No respiratory distress  Breath sounds: Normal breath sounds  No wheezing or rales  Abdominal:      General: Abdomen is flat  Bowel sounds are normal  There is no distension  There are no signs of injury  Palpations: Abdomen is soft  There is no mass  Tenderness: There is abdominal tenderness in the suprapubic area  There is no right CVA tenderness, left CVA tenderness, guarding or rebound  Hernia: No hernia is present  There is no hernia in the umbilical area or ventral area  Musculoskeletal: Normal range of motion  General: No deformity  Skin:     General: Skin is warm and dry  Capillary Refill: Capillary refill takes less than 2 seconds  Coloration: Skin is not cyanotic or jaundiced  Findings: No erythema or rash  Neurological:      Mental Status: She is alert and oriented to person, place, and time  Sensory: No sensory deficit     Psychiatric:         Mood and Affect: Mood normal          Behavior: Behavior normal          Vital Signs  ED Triage Vitals   Temperature Pulse Respirations Blood Pressure SpO2   08/29/20 0410 08/29/20 0410 08/29/20 0410 08/29/20 0410 08/29/20 0410   97 5 °F (36 4 °C) 100 18 142/80 98 %      Temp Source Heart Rate Source Patient Position - Orthostatic VS BP Location FiO2 (%)   08/29/20 0410 08/29/20 0410 08/29/20 0410 08/29/20 0410 --   Tympanic Monitor Sitting Left arm       Pain Score       08/29/20 0438       Worst Possible Pain           Vitals:    08/29/20 0410 08/29/20 0539 08/29/20 0642   BP: 142/80 121/64 95/54   Pulse: 100 83 81   Patient Position - Orthostatic VS: Sitting Lying Lying         Visual Acuity      ED Medications  Medications   ketorolac (TORADOL) injection 15 mg (15 mg Intramuscular Given 8/29/20 0438)   sodium chloride 0 9 % bolus 1,000 mL (0 mL Intravenous Stopped 8/29/20 0642)   dicyclomine (BENTYL) tablet 20 mg (20 mg Oral Given 8/29/20 0502)   iohexol (OMNIPAQUE) 350 MG/ML injection (SINGLE-DOSE) 100 mL (100 mL Intravenous Given 8/29/20 0536)   acetaminophen (TYLENOL) tablet 975 mg (975 mg Oral Given 8/29/20 0540)       Diagnostic Studies  Results Reviewed     Procedure Component Value Units Date/Time    Comprehensive metabolic panel [[id number]]  (Abnormal) Collected:  08/29/20 0506    Lab Status:  Final result Specimen:  Blood from Arm, Right Updated:  08/29/20 0526     Sodium 139 mmol/L      Potassium 3 4 mmol/L      Chloride 106 mmol/L      CO2 21 mmol/L      ANION GAP 12 mmol/L      BUN 7 mg/dL      Creatinine 0 60 mg/dL      Glucose 124 mg/dL      Calcium 9 2 mg/dL      AST 24 U/L      ALT 17 U/L      Alkaline Phosphatase 63 U/L      Total Protein 7 8 g/dL      Albumin 4 5 g/dL      Total Bilirubin 0 20 mg/dL      eGFR 124 ml/min/1 73sq m     Narrative:       Paula guidelines for Chronic Kidney Disease (CKD):     Stage 1 with normal or high GFR (GFR > 90 mL/min/1 73 square meters)    Stage 2 Mild CKD (GFR = 60-89 mL/min/1 73 square meters)    Stage 3A Moderate CKD (GFR = 45-59 mL/min/1 73 square meters)    Stage 3B Moderate CKD (GFR = 30-44 mL/min/1 73 square meters)    Stage 4 Severe CKD (GFR = 15-29 mL/min/1 73 square meters)    Stage 5 End Stage CKD (GFR <15 mL/min/1 73 square meters)  Note: GFR calculation is accurate only with a steady state creatinine    CBC and differential [[id number]]  (Abnormal) Collected:  08/29/20 0506    Lab Status:  Final result Specimen:  Blood from Arm, Right Updated:  08/29/20 0518     WBC 8 10 Thousand/uL      RBC 4 82 Million/uL      Hemoglobin 14 4 g/dL      Hematocrit 42 2 %      MCV 88 fL      MCH 29 9 pg      MCHC 34 2 g/dL      RDW 13 0 %      MPV 9 2 fL      Platelets 754 Thousands/uL      Neutrophils Relative 67 %      Lymphocytes Relative 24 %      Monocytes Relative 8 %      Eosinophils Relative 2 %      Basophils Relative 0 %      Neutrophils Absolute 5 40 Thousands/µL      Lymphocytes Absolute 1 90 Thousands/µL      Monocytes Absolute 0 60 Thousand/µL      Eosinophils Absolute 0 10 Thousand/µL      Basophils Absolute 0 00 Thousands/µL     UA w Reflex to Microscopic w Reflex to Culture [[id number]]  (Normal) Collected:  08/29/20 0436    Lab Status:  Final result Specimen:  Urine, Clean Catch Updated:  08/29/20 0445     Color, UA Yellow     Clarity, UA Clear     Specific [place], UA 1 020     pH, UA 5 0     Leukocytes, UA Negative     Nitrite, UA Negative     Protein, UA Negative mg/dl      Glucose, UA Negative mg/dl      Ketones, UA Negative mg/dl      Bilirubin, UA Negative     Blood, UA Negative     UROBILINOGEN UA Negative mg/dL     POCT pregnancy, urine [[id number]]  (Normal) Resulted:  08/29/20 0436    Lab Status:  Final result Updated:  08/29/20 0436     EXT PREG TEST UR (Ref: Negative) Negative     Control Valid                 CT abdomen pelvis with contrast   Final Result by Kiley Pinto DO (08/29 6168)   1   5 1 cm hypodense lesion in the right adnexa with small amount of fluid in the adnexa, extending into the pelvis    Findings most compatible with rupture of mildly complex cyst    According to current guidelines (J Am Brandee Radiol 2020; 85:535-882) in    this premenopausal woman, this should be further characterized by routine, outpatient pelvic ultrasound  2   Circumferential bladder wall thickening  Although the findings are likely related to under distention, given the patient's symptoms, correlate for mild cystitis  3   Massively distended debris-filled stomach  Although the findings may be related to the nonfasting state of the patient (last ate at approximately 11:00 PM last evening) correlate for gastroparesis  The study was marked in [facility] for immediate notification  Workstation performed: [id number]                    Procedures  Procedures         ED Course  ED Course as of Aug 31 1445   Sat Aug 29, 2020   0418 34 yo female presents to ED for suprapubic tenderness that radiates to the bilateral flanks x1 hour  Patient reports she was attempting to fall asleep when she began noticing her pain  Denies any other associated symptoms such as nausea, vomiting diarrhea, change in bowel bladder function, urinary frequency, fever, chills, other complaints  PE unremarkable  Abdomen soft  Normal bowel sounds  0437 HcG neg      0449 UA no signs of infection  Proceed to CT  AP and labs  US AUDIT      Most Recent Value   Initial Alcohol Screen: US AUDIT-C    1  How often do you have a drink containing alcohol? 1 Filed at: 08/29/2020 0411   2  How many drinks containing alcohol do you have on a typical day you are drinking? 0 Filed at: 08/29/2020 0411   3a  Male UNDER 65: How often do you have five or more drinks on one occasion? 0 Filed at: 08/29/2020 0411   3b  FEMALE Any Age, or MALE 65+: How often do you have 4 or more drinks on one occassion? 1 Filed at: 08/29/2020 0411   Audit-C Score  2 Filed at: 08/29/2020 0411                  MICHELLE/DAST-10      Most Recent Value   How many times in the past year have you    Used an illegal drug or used a prescription medication for non-medical reasons? Never Filed at: 08/29/2020 0411                                [facility]  Number of Diagnoses or Management Options  Constipation, unspecified constipation type: new and requires workup  Dysmenorrhea: new and requires workup  Gastroparesis: new and requires workup  Right ovarian enlargement: new and requires workup  Diagnosis management comments: 52-year-old female presenting ED for generalized pain that was most noted that suprapubic region and bilateral flanks  CT findings included 1   5 1 cm hypodense lesion in the right adnexa with small amount of fluid in the adnexa, extending into the pelvis  Findings most compatible with rupture of mildly complex cyst    According to current guidelines Rosa Heaps Brandee Radiol 2020; 38:808-140) in   this premenopausal woman, this should be further characterized by routine, outpatient pelvic ultrasound  2   Circumferential bladder wall thickening  Although the findings are likely related to under distention, given the patient's symptoms, correlate for mild cystitis  3   Massively distended debris-filled stomach  Although the findings may be related to the nonfasting state of the patient (last ate at approximately 11:00 PM last evening) correlate for gastroparesis  Patient was provided information to follow-up with [facility] for outpatient pelvic ultrasound  UA was unremarkable and normal     Remaining of labs were noncontributory  Patient was also provided outpatient medication for treatment of constipation  Patient stable at discharge  Vital signs stable at discharge  Discussed the results of labs  to include all significant and non-significant findings, and the need for any follow-up imaging or care  Discussed the most likely cause of current symptoms  Discussed in detail any red flag signs and symptoms that would require immediate follow-up care in the emergency room  Instructed to follow-up with primary care provider for reevaluation    Discussed all prescribed medications to include doses, use, and route  Patient was satisfied with discharge plan and was provided the opportunity to inquire about any questions or concerns regarding ED visit  Amount and/or Complexity of Data Reviewed  Clinical lab tests: ordered and reviewed  Tests in the radiology section of CPT®: ordered and reviewed  Review and summarize past medical records: yes  Discuss the patient with other providers: yes  Independent visualization of images, tracings, or specimens: yes    Risk of Complications, Morbidity, and/or Mortality  Presenting problems: moderate  Diagnostic procedures: moderate  Management options: moderate    Patient Progress  Patient progress: stable        Disposition  Final diagnoses:   Dysmenorrhea   Constipation, unspecified constipation type   Right ovarian enlargement   Gastroparesis     Time reflects when diagnosis was documented in both MDM as applicable and the Disposition within this note     Time User Action Codes Description Comment    8/29/2020  5:42 AM [place] Add [N94 6] Dysmenorrhea     8/29/2020  5:42 AM [place] Add [K59 00] Constipation, unspecified constipation type     8/29/2020  6:29 AM Loralie Halsted L Add [N83 8] Right ovarian enlargement     8/29/2020  6:34 AM Jenn [place] Add [K31 84] Gastroparesis       ED Disposition     ED Disposition Condition Date/Time Comment    Discharge Stable Sat Aug 29, 2020  4:28 AM [address] discharge to home/self care              Follow-up Information     Follow up With Specialties Details Why Contact Info Additional [address] [facility] Obstetrics and Gynecology Call   [address] [place] [address] Family Medicine Schedule an appointment as soon as possible for a visit in 1 week  [address] , [address] [facility] [id number]  [address] [place] [address] [address] [facility], [address] [place], [place], [address] Obstetrics and Gynecology Schedule an appointment as soon as possible for a visit in 2 days  [facility] [address] [facility]  [phone]             Discharge Medication List as of 8/29/2020  6:35 AM      START taking these medications    Details   naproxen (NAPROSYN) 500 mg tablet Take 1 tablet (500 mg total) by mouth 2 (two) times a day with meals, Starting Sat 8/29/2020, Normal      polyethylene glycol (GLYCOLAX) 17 GM/SCOOP powder Take 17 g by mouth daily, Starting Sat 8/29/2020, Normal      senna (SENOKOT) 8 6 mg Take 1 tablet (8 6 mg total) by mouth daily at bedtime, Starting Sat 8/29/2020, Normal         CONTINUE these medications which have NOT CHANGED    Details   acetaminophen (TYLENOL) 500 mg tablet Take 1 tablet (500 mg total) by mouth every 6 (six) hours as needed (pain), Starting Sat 8/22/2020, Normal      gabapentin (NEURONTIN) 300 mg capsule Take 1 capsule (300 mg total) by mouth 3 (three) times a day, Starting Thu 1/23/2020, Normal      menthol-cetylpyridinium (CEPACOL) 3 MG lozenge Take 1 lozenge (3 mg total) by mouth as needed for sore throat, Starting Sat 8/22/2020, Normal      naproxen (EC NAPROSYN) 500 MG EC tablet Take 1 tablet (500 mg total) by mouth 2 (two) times a day with meals, Starting Sat 8/22/2020, Until Sun 8/22/2021, Normal           No discharge procedures on file      PDMP Review     None          ED Provider  Electronically Signed by           Valma Simmonds, PA-C  08/31/20 5266

## 2020-09-24 ENCOUNTER — OFFICE VISIT (OUTPATIENT)
Dept: FAMILY MEDICINE CLINIC | Facility: CLINIC | Age: 30
End: 2020-09-24

## 2020-09-24 VITALS
TEMPERATURE: 97.5 F | OXYGEN SATURATION: 98 % | WEIGHT: 137 LBS | BODY MASS INDEX: 28.63 KG/M2 | SYSTOLIC BLOOD PRESSURE: 98 MMHG | RESPIRATION RATE: 18 BRPM | DIASTOLIC BLOOD PRESSURE: 64 MMHG | HEART RATE: 84 BPM

## 2020-09-24 DIAGNOSIS — G43.009 MIGRAINE WITHOUT AURA AND WITHOUT STATUS MIGRAINOSUS, NOT INTRACTABLE: Primary | ICD-10-CM

## 2020-09-24 DIAGNOSIS — Z23 NEED FOR VACCINATION: ICD-10-CM

## 2020-09-24 DIAGNOSIS — N94.6 DYSMENORRHEA: ICD-10-CM

## 2020-09-24 DIAGNOSIS — K59.00 CONSTIPATION, UNSPECIFIED CONSTIPATION TYPE: ICD-10-CM

## 2020-09-24 DIAGNOSIS — Z92.89 HISTORY OF DENTAL SURGERY: ICD-10-CM

## 2020-09-24 DIAGNOSIS — A69.20 LYME DISEASE: ICD-10-CM

## 2020-09-24 PROCEDURE — 90686 IIV4 VACC NO PRSV 0.5 ML IM: CPT

## 2020-09-24 PROCEDURE — 90471 IMMUNIZATION ADMIN: CPT

## 2020-09-24 PROCEDURE — 99214 OFFICE O/P EST MOD 30 MIN: CPT | Performed by: NURSE PRACTITIONER

## 2020-09-24 RX ORDER — POLYETHYLENE GLYCOL 3350 17 G/17G
17 POWDER, FOR SOLUTION ORAL DAILY
Qty: 507 G | Refills: 2 | Status: SHIPPED | OUTPATIENT
Start: 2020-09-24 | End: 2022-03-14

## 2020-09-24 RX ORDER — SUMATRIPTAN 25 MG/1
25 TABLET, FILM COATED ORAL ONCE AS NEEDED
Qty: 9 TABLET | Refills: 0 | Status: SHIPPED | OUTPATIENT
Start: 2020-09-24 | End: 2020-10-08 | Stop reason: SDUPTHER

## 2020-09-24 RX ORDER — TOPIRAMATE 25 MG/1
25 TABLET ORAL 2 TIMES DAILY
Qty: 60 TABLET | Refills: 0 | Status: SHIPPED | OUTPATIENT
Start: 2020-09-24 | End: 2020-10-08

## 2020-09-24 RX ORDER — TRAMADOL HYDROCHLORIDE 50 MG/1
50 TABLET ORAL EVERY 8 HOURS PRN
Qty: 9 TABLET | Refills: 0 | OUTPATIENT
Start: 2020-09-24 | End: 2020-10-01

## 2020-09-24 RX ORDER — NAPROXEN 500 MG/1
500 TABLET ORAL 2 TIMES DAILY WITH MEALS
Qty: 30 TABLET | Refills: 0 | Status: SHIPPED | OUTPATIENT
Start: 2020-09-24 | End: 2020-10-30 | Stop reason: ALTCHOICE

## 2020-09-24 NOTE — ASSESSMENT & PLAN NOTE
Continue with Miralax daily  Encouraged to drink increased fluids and eat more fiber rich foods  Increase activity

## 2020-09-24 NOTE — PROGRESS NOTES
Assessment/Plan:    Migraine without aura and without status migrainosus, not intractable  Patient previously tried Topamax - was stopped after trying 50 mg dose due to being ineffective--> discussed with patient that this medication can be titrated up to 200 mg/ day and she was likely on a subtherapeutic dose   Start topiramate at 25 mg QD x 1 week--> 50 mg QD --> increase medication as indicated   Also start magnesium supplement daily   Will taper off of gabapentin- 300 mg tid currently, will do 300 bid x 1 week, 300 mg daily x 1 week then stop   Will also start patient on Imitrex for abortive tx as she has never tried these medications  Will re-enter neurology consult per patient's request     Constipation  Continue with Miralax daily  Encouraged to drink increased fluids and eat more fiber rich foods  Increase activity     History of dental surgery  4 wisdom teeth pulled prior to appt today-   No medication given by dentist  Will send naproxen and 3 days of pain medication  PDMP reviewed        Lyme disease  Hx of Lyme disease in 2019 tx with amoxicillin 500 mg tid x 3 weeks  Patient reports ongoing polyarthralgia- will check Lyme test         Return in about 2 weeks (around 10/8/2020) for Headache- can be virtual        Patient Instructions   Migraine Headache   AMBULATORY CARE:   A migraine headache  is a severe headache  The pain can be so severe that it interferes with your daily activities  A migraine can last a few hours up to several days  The exact cause of migraines is not known     Common triggers for a migraine include the following:   · Stress, eye strain, oversleeping, or not getting enough sleep    · Hormone changes in women from birth control pills, pregnancy, menopause, or during a monthly period    · Skipping meals, going too long without eating, or not drinking enough liquids    · Certain foods or drinks such as chocolate, hard cheese, red wine, or drinks that contain caffeine    · Foods that contain gluten, nitrates, MSG, or artificial sweeteners    · Sunlight, bright or flashing lights, loud noises, smoke, or strong smells    · Heat, humidity, or changes in the weather  Common warning signs include the following:  Warning signs usually start 15 to 60 minutes before the headache:  · Visual changes (auras), such as blurred vision, temporary blind or bright spots, lines, or hallucinations    · Unusual tiredness or frequent yawning    · Tingling in an arm or leg  Seek care immediately if:   · You have a headache that seems different or much worse than your usual migraine headache  · You have a severe headache with a fever or a stiff neck  · You have new problems with speech, vision, balance, or movement  · You feel like you are going to faint, you become confused, or you have a seizure  Contact your healthcare provider or neurologist if:   · You have a fever  · Your migraines interfere with your daily activities  · Your medicines or treatments stop working  · You have questions about your condition or care  Treatment:  Migraines cannot be cured  The goal of treatment is to reduce your symptoms  Take medicine as soon as you feel a migraine begin  · Prescription pain medicine  may be given  Do not wait until the pain is severe before you take your medicine  · Migraine medicines  are used to help prevent a migraine or stop it once it starts  · Antinausea medicine  may be given to calm your stomach and to help prevent vomiting  This medicine can also help relieve pain  Manage your symptoms:   · Rest in a dark, quiet room  This will help decrease your pain  Sleep may also help relieve the pain  · Apply ice to decrease pain  Use an ice pack, or put crushed ice in a plastic bag  Cover the ice pack with a towel and place it on your head where it hurts for 15 to 20 minutes every hour  · Apply heat to decrease pain and muscle spasms    Use a small towel dampened with warm water or a heating pad, or sit in a warm bath  Apply heat on the area for 20 to 30 minutes every 2 hours  You may alternate heat and ice  · Keep a migraine record  Write down when your migraines start and stop  Include your symptoms and what you were doing when a migraine began  Record what you ate or drank for 24 hours before the migraine started  Keep track of what you did to treat your migraine and if it worked  Follow up with your healthcare provider as directed:  Bring your migraine record with you  Write down your questions so you remember to ask them during your visits  Prevent another migraine headache:   · Do not smoke  Nicotine and other chemicals in cigarettes and cigars can trigger a migraine and also cause lung damage  Ask your healthcare provider for information if you currently smoke and need help to quit  E-cigarettes or smokeless tobacco still contain nicotine  Talk to your healthcare provider before you use these products  · Do not drink alcohol  Alcohol can trigger a migraine  It can also interfere with the medicines used to treat your migraine  · Exercise regularly  Ask about the best exercise plan for you  · Manage stress  Stress may trigger a migraine  Learn new ways to relax, such as deep breathing  · Follow a sleep schedule  Go to bed and get up at the same time each day  · Eat a variety of healthy foods  Healthy foods include fruit, vegetables, whole-grain breads, low-fat dairy products, beans, lean meats, and fish  Do not have foods or drinks that trigger your migraines  © 2017 [address] [employer] Information is for End User's use only and may not be sold, redistributed or otherwise used for commercial purposes  All illustrations and images included in CareNotes® are the copyrighted property of [employer]  or Alexis Swan  The above information is an  only   It is not intended as medical advice for individual conditions or treatments  Talk to your doctor, nurse or pharmacist before following any medical regimen to see if it is safe and effective for you  Diagnoses and all orders for this visit:    Migraine without aura and without status migrainosus, not intractable  -     Ambulatory referral to Neurology; Future  -     SUMAtriptan (IMITREX) 25 mg tablet; Take 1 tablet (25 mg total) by mouth once as needed for migraine for up to 1 dose  -     magnesium oxide (MAG-OX) 400 mg; Take 1 tablet (400 mg total) by mouth daily  -     Lyme Antibody Profile with reflex to WB; Future  -     Hemoglobin A1C; Future  -     Comprehensive metabolic panel; Future  -     topiramate (TOPAMAX) 25 mg tablet; Take 1 tablet (25 mg total) by mouth 2 (two) times a day    Constipation, unspecified constipation type  -     polyethylene glycol (GLYCOLAX) 17 GM/SCOOP powder; Take 17 g by mouth daily    History of dental surgery  -     traMADol (ULTRAM) 50 mg tablet; Take 1 tablet (50 mg total) by mouth every 8 (eight) hours as needed for moderate pain    Need for vaccination  -     influenza vaccine, quadrivalent, 0 5 mL, preservative-free, for adult and pediatric patients 6 mos+ (AFLURIA, FLUARIX, FLULAVAL, FLUZONE)    Lyme disease  -     Lyme Antibody Profile with reflex to WB; Future  -     Comprehensive metabolic panel; Future    Dysmenorrhea  -     naproxen (NAPROSYN) 500 mg tablet; Take 1 tablet (500 mg total) by mouth 2 (two) times a day with meals          Subjective:     Kassie Terrazas is a 35 y o  female who  has a past medical history of Frequent headaches  who presented to the office today for follow up of migraines  She was previously started on gabapentin by PCP after inadequate response to topiramate  She was referred to neurology but the due to coronavirus she was unable to go    She states when she called to make an appointment the office told her she needed a new referral   Patient states that since being on gabapentin headaches are unchanged  She she continues with frequent throbbing frontal headaches with associated photophobia  She does also have multiple joint pains and was previously tx for Lyme in 2019  She had all 4 wisdom teeth removed prior to her appointment today by her dentist  She is having jaw pain and facial swelling  Her dentist did not provide her with any medications for pain relief  The following portions of the patient's history were reviewed and updated as appropriate: allergies, current medications, past family history, past medical history, past social history, past surgical history and problem list     Current Outpatient Medications on File Prior to Visit   Medication Sig Dispense Refill    gabapentin (NEURONTIN) 300 mg capsule Take 1 capsule (300 mg total) by mouth 3 (three) times a day 270 capsule 1    acetaminophen (TYLENOL) 500 mg tablet Take 1 tablet (500 mg total) by mouth every 6 (six) hours as needed (pain) (Patient not taking: Reported on 9/24/2020) 30 tablet 0    menthol-cetylpyridinium (CEPACOL) 3 MG lozenge Take 1 lozenge (3 mg total) by mouth as needed for sore throat (Patient not taking: Reported on 9/24/2020) 30 lozenge 0    senna (SENOKOT) 8 6 mg Take 1 tablet (8 6 mg total) by mouth daily at bedtime (Patient not taking: Reported on 9/24/2020) 120 each 0    [DISCONTINUED] naproxen (EC NAPROSYN) 500 MG EC tablet Take 1 tablet (500 mg total) by mouth 2 (two) times a day with meals (Patient not taking: Reported on 9/24/2020) 20 tablet 0    [DISCONTINUED] naproxen (NAPROSYN) 500 mg tablet Take 1 tablet (500 mg total) by mouth 2 (two) times a day with meals (Patient not taking: Reported on 9/24/2020) 30 tablet 0    [DISCONTINUED] polyethylene glycol (GLYCOLAX) 17 GM/SCOOP powder Take 17 g by mouth daily (Patient not taking: Reported on 9/24/2020) 225 g 0     No current facility-administered medications on file prior to visit          Review of Systems   Constitutional: Negative for activity change, appetite change, chills, fatigue, fever and unexpected weight change  HENT: Positive for facial swelling  Negative for hearing loss, nosebleeds, sinus pain, sneezing, sore throat and trouble swallowing  Eyes: Negative for photophobia and visual disturbance  Respiratory: Negative for cough, chest tightness, shortness of breath and wheezing  Cardiovascular: Negative for chest pain, palpitations and leg swelling  Gastrointestinal: Negative for abdominal pain, constipation, nausea and vomiting  Genitourinary: Negative for decreased urine volume, difficulty urinating, dysuria, flank pain and urgency  Musculoskeletal: Negative for back pain and gait problem  Skin: Negative for pallor, rash and wound  Neurological: Positive for headaches  Negative for dizziness, seizures, syncope, weakness and numbness  Hematological: Negative for adenopathy  Does not bruise/bleed easily  Objective:    BP 98/64 (BP Location: Right arm, Patient Position: Sitting, Cuff Size: Standard)   Pulse 84   Temp 97 5 °F (36 4 °C) (Temporal)   Resp 18   Wt 62 1 kg (137 lb)   LMP 08/29/2020   SpO2 98%   BMI 28 63 kg/m²     Physical Exam  Vitals signs and nursing note reviewed  Constitutional:       General: She is not in acute distress  Appearance: She is well-developed  She is not diaphoretic  HENT:      Head: Normocephalic and atraumatic  Right Ear: External ear normal       Left Ear: External ear normal    Eyes:      Pupils: Pupils are equal, round, and reactive to light  Neck:      Musculoskeletal: Normal range of motion and neck supple  Cardiovascular:      Rate and Rhythm: Normal rate and regular rhythm  Pulses: Normal pulses  Heart sounds: Normal heart sounds  Pulmonary:      Effort: Pulmonary effort is normal  No respiratory distress  Breath sounds: Normal breath sounds  No wheezing     Abdominal:      General: Bowel sounds are normal  There is no distension  Palpations: Abdomen is soft  Tenderness: There is no abdominal tenderness  Musculoskeletal: Normal range of motion  General: No deformity  Right lower leg: No edema  Left lower leg: No edema  Lymphadenopathy:      Cervical: No cervical adenopathy  Skin:     General: Skin is warm and dry  Capillary Refill: Capillary refill takes less than 2 seconds  Findings: No rash  Neurological:      General: No focal deficit present  Mental Status: She is alert and oriented to person, place, and time     Psychiatric:         Mood and Affect: Mood normal          Behavior: Behavior normal          LEIGHANN Sauer  09/24/20  2:22 PM

## 2020-09-24 NOTE — ASSESSMENT & PLAN NOTE
4 wisdom teeth pulled prior to appt today-   No medication given by dentist  Will send naproxen and 3 days of pain medication  PDMP reviewed

## 2020-09-24 NOTE — ASSESSMENT & PLAN NOTE
Patient previously tried Topamax - was stopped after trying 50 mg dose due to being ineffective--> discussed with patient that this medication can be titrated up to 200 mg/ day and she was likely on a subtherapeutic dose   Start topiramate at 25 mg QD x 1 week--> 50 mg QD --> increase medication as indicated   Also start magnesium supplement daily   Will taper off of gabapentin- 300 mg tid currently, will do 300 bid x 1 week, 300 mg daily x 1 week then stop   Will also start patient on Imitrex for abortive tx as she has never tried these medications  Will re-enter neurology consult per patient's request

## 2020-09-24 NOTE — ASSESSMENT & PLAN NOTE
Hx of Lyme disease in 2019 tx with amoxicillin 500 mg tid x 3 weeks  Patient reports ongoing polyarthralgia- will check Lyme test

## 2020-09-24 NOTE — PATIENT INSTRUCTIONS
Migraine Headache   AMBULATORY CARE:   A migraine headache  is a severe headache  The pain can be so severe that it interferes with your daily activities  A migraine can last a few hours up to several days  The exact cause of migraines is not known  Common triggers for a migraine include the following:   · Stress, eye strain, oversleeping, or not getting enough sleep    · Hormone changes in women from birth control pills, pregnancy, menopause, or during a monthly period    · Skipping meals, going too long without eating, or not drinking enough liquids    · Certain foods or drinks such as chocolate, hard cheese, red wine, or drinks that contain caffeine    · Foods that contain gluten, nitrates, MSG, or artificial sweeteners    · Sunlight, bright or flashing lights, loud noises, smoke, or strong smells    · Heat, humidity, or changes in the weather  Common warning signs include the following:  Warning signs usually start 15 to 60 minutes before the headache:  · Visual changes (auras), such as blurred vision, temporary blind or bright spots, lines, or hallucinations    · Unusual tiredness or frequent yawning    · Tingling in an arm or leg  Seek care immediately if:   · You have a headache that seems different or much worse than your usual migraine headache  · You have a severe headache with a fever or a stiff neck  · You have new problems with speech, vision, balance, or movement  · You feel like you are going to faint, you become confused, or you have a seizure  Contact your healthcare provider or neurologist if:   · You have a fever  · Your migraines interfere with your daily activities  · Your medicines or treatments stop working  · You have questions about your condition or care  Treatment:  Migraines cannot be cured  The goal of treatment is to reduce your symptoms  Take medicine as soon as you feel a migraine begin  · Prescription pain medicine  may be given   Do not wait until the pain is severe before you take your medicine  · Migraine medicines  are used to help prevent a migraine or stop it once it starts  · Antinausea medicine  may be given to calm your stomach and to help prevent vomiting  This medicine can also help relieve pain  Manage your symptoms:   · Rest in a dark, quiet room  This will help decrease your pain  Sleep may also help relieve the pain  · Apply ice to decrease pain  Use an ice pack, or put crushed ice in a plastic bag  Cover the ice pack with a towel and place it on your head where it hurts for 15 to 20 minutes every hour  · Apply heat to decrease pain and muscle spasms  Use a small towel dampened with warm water or a heating pad, or sit in a warm bath  Apply heat on the area for 20 to 30 minutes every 2 hours  You may alternate heat and ice  · Keep a migraine record  Write down when your migraines start and stop  Include your symptoms and what you were doing when a migraine began  Record what you ate or drank for 24 hours before the migraine started  Keep track of what you did to treat your migraine and if it worked  Follow up with your healthcare provider as directed:  Bring your migraine record with you  Write down your questions so you remember to ask them during your visits  Prevent another migraine headache:   · Do not smoke  Nicotine and other chemicals in cigarettes and cigars can trigger a migraine and also cause lung damage  Ask your healthcare provider for information if you currently smoke and need help to quit  E-cigarettes or smokeless tobacco still contain nicotine  Talk to your healthcare provider before you use these products  · Do not drink alcohol  Alcohol can trigger a migraine  It can also interfere with the medicines used to treat your migraine  · Exercise regularly  Ask about the best exercise plan for you  · Manage stress  Stress may trigger a migraine  Learn new ways to relax, such as deep breathing      · Follow a sleep schedule  Go to bed and get up at the same time each day  · Eat a variety of healthy foods  Healthy foods include fruit, vegetables, whole-grain breads, low-fat dairy products, beans, lean meats, and fish  Do not have foods or drinks that trigger your migraines  © 2017 [employer] Information is for End User's use only and may not be sold, redistributed or otherwise used for commercial purposes  All illustrations and images included in CareNotes® are the copyrighted property of [employer]  or Alexis Swan  The above information is an  only  It is not intended as medical advice for individual conditions or treatments  Talk to your doctor, nurse or pharmacist before following any medical regimen to see if it is safe and effective for you

## 2020-10-01 ENCOUNTER — HOSPITAL ENCOUNTER (EMERGENCY)
Facility: HOSPITAL | Age: 30
Discharge: HOME | End: 2020-10-01
Attending: EMERGENCY MEDICINE | Admitting: EMERGENCY MEDICINE
Payer: COMMERCIAL

## 2020-10-01 VITALS
OXYGEN SATURATION: 96 % | SYSTOLIC BLOOD PRESSURE: 123 MMHG | HEART RATE: 69 BPM | TEMPERATURE: 97.8 F | DIASTOLIC BLOOD PRESSURE: 71 MMHG | RESPIRATION RATE: 20 BRPM

## 2020-10-01 DIAGNOSIS — M27.3 DRY SOCKET: Primary | ICD-10-CM

## 2020-10-01 PROCEDURE — 99282 EMERGENCY DEPT VISIT SF MDM: CPT

## 2020-10-01 PROCEDURE — 99284 EMERGENCY DEPT VISIT MOD MDM: CPT | Performed by: EMERGENCY MEDICINE

## 2020-10-01 RX ORDER — HYDROCODONE BITARTRATE AND ACETAMINOPHEN 5; 325 MG/1; MG/1
1 TABLET ORAL EVERY 4 HOURS PRN
Qty: 10 TABLET | Refills: 0 | Status: SHIPPED | OUTPATIENT
Start: 2020-10-01 | End: 2020-10-11

## 2020-10-08 ENCOUNTER — TELEMEDICINE (OUTPATIENT)
Dept: FAMILY MEDICINE CLINIC | Facility: CLINIC | Age: 30
End: 2020-10-08

## 2020-10-08 DIAGNOSIS — G43.009 MIGRAINE WITHOUT AURA AND WITHOUT STATUS MIGRAINOSUS, NOT INTRACTABLE: ICD-10-CM

## 2020-10-08 PROCEDURE — G2012 BRIEF CHECK IN BY MD/QHP: HCPCS | Performed by: NURSE PRACTITIONER

## 2020-10-08 RX ORDER — SUMATRIPTAN 25 MG/1
25 TABLET, FILM COATED ORAL ONCE AS NEEDED
Qty: 12 TABLET | Refills: 0 | Status: SHIPPED | OUTPATIENT
Start: 2020-10-08 | End: 2020-10-30 | Stop reason: ALTCHOICE

## 2020-10-08 RX ORDER — TOPIRAMATE 25 MG/1
75 TABLET ORAL 2 TIMES DAILY
Qty: 180 TABLET | Refills: 0 | Status: SHIPPED | OUTPATIENT
Start: 2020-10-08 | End: 2021-01-14 | Stop reason: ALTCHOICE

## 2020-10-30 ENCOUNTER — CONSULT (OUTPATIENT)
Dept: NEUROLOGY | Facility: CLINIC | Age: 30
End: 2020-10-30
Payer: COMMERCIAL

## 2020-10-30 VITALS
BODY MASS INDEX: 29.66 KG/M2 | DIASTOLIC BLOOD PRESSURE: 68 MMHG | HEIGHT: 58 IN | HEART RATE: 99 BPM | SYSTOLIC BLOOD PRESSURE: 100 MMHG | WEIGHT: 141.3 LBS | TEMPERATURE: 97.8 F

## 2020-10-30 DIAGNOSIS — E55.9 VITAMIN D DEFICIENCY: ICD-10-CM

## 2020-10-30 DIAGNOSIS — G43.009 MIGRAINE WITHOUT AURA AND WITHOUT STATUS MIGRAINOSUS, NOT INTRACTABLE: ICD-10-CM

## 2020-10-30 DIAGNOSIS — G44.229 CHRONIC TENSION-TYPE HEADACHE, NOT INTRACTABLE: ICD-10-CM

## 2020-10-30 DIAGNOSIS — G43.701 CHRONIC MIGRAINE WITHOUT AURA WITH STATUS MIGRAINOSUS, NOT INTRACTABLE: Primary | ICD-10-CM

## 2020-10-30 DIAGNOSIS — F51.01 PRIMARY INSOMNIA: ICD-10-CM

## 2020-10-30 PROBLEM — G47.00 INSOMNIA: Status: ACTIVE | Noted: 2020-10-30

## 2020-10-30 PROCEDURE — 3008F BODY MASS INDEX DOCD: CPT | Performed by: PSYCHIATRY & NEUROLOGY

## 2020-10-30 PROCEDURE — 99245 OFF/OP CONSLTJ NEW/EST HI 55: CPT | Performed by: PSYCHIATRY & NEUROLOGY

## 2020-10-30 RX ORDER — PROCHLORPERAZINE MALEATE 10 MG
TABLET ORAL
Qty: 20 TABLET | Refills: 3 | Status: SHIPPED | OUTPATIENT
Start: 2020-10-30 | End: 2021-08-17 | Stop reason: ALTCHOICE

## 2020-10-30 RX ORDER — SUMATRIPTAN 100 MG/1
TABLET, FILM COATED ORAL
Qty: 12 TABLET | Refills: 3 | Status: SHIPPED | OUTPATIENT
Start: 2020-10-30 | End: 2021-01-14 | Stop reason: ALTCHOICE

## 2020-10-30 RX ORDER — CYPROHEPTADINE HYDROCHLORIDE 4 MG/1
TABLET ORAL
Qty: 30 TABLET | Refills: 3 | Status: SHIPPED | OUTPATIENT
Start: 2020-10-30 | End: 2021-04-27 | Stop reason: SDUPTHER

## 2020-12-31 ENCOUNTER — TELEPHONE (OUTPATIENT)
Dept: NEUROLOGY | Facility: CLINIC | Age: 30
End: 2020-12-31

## 2021-01-14 ENCOUNTER — OFFICE VISIT (OUTPATIENT)
Dept: NEUROLOGY | Facility: CLINIC | Age: 31
End: 2021-01-14
Payer: COMMERCIAL

## 2021-01-14 VITALS
DIASTOLIC BLOOD PRESSURE: 64 MMHG | WEIGHT: 149 LBS | BODY MASS INDEX: 31.28 KG/M2 | HEART RATE: 86 BPM | SYSTOLIC BLOOD PRESSURE: 110 MMHG | HEIGHT: 58 IN

## 2021-01-14 DIAGNOSIS — Z72.0 VAPES NICOTINE CONTAINING SUBSTANCE: ICD-10-CM

## 2021-01-14 DIAGNOSIS — G44.229 CHRONIC TENSION-TYPE HEADACHE, NOT INTRACTABLE: Primary | ICD-10-CM

## 2021-01-14 DIAGNOSIS — F51.01 PRIMARY INSOMNIA: ICD-10-CM

## 2021-01-14 DIAGNOSIS — G43.701 CHRONIC MIGRAINE WITHOUT AURA WITH STATUS MIGRAINOSUS, NOT INTRACTABLE: ICD-10-CM

## 2021-01-14 DIAGNOSIS — K59.00 CONSTIPATION, UNSPECIFIED CONSTIPATION TYPE: ICD-10-CM

## 2021-01-14 PROCEDURE — 3008F BODY MASS INDEX DOCD: CPT | Performed by: NURSE PRACTITIONER

## 2021-01-14 PROCEDURE — 99214 OFFICE O/P EST MOD 30 MIN: CPT | Performed by: NURSE PRACTITIONER

## 2021-01-14 RX ORDER — RIZATRIPTAN BENZOATE 5 MG/1
5 TABLET ORAL ONCE AS NEEDED
Qty: 9 TABLET | Refills: 0 | Status: SHIPPED | OUTPATIENT
Start: 2021-01-14 | End: 2021-04-27 | Stop reason: SDUPTHER

## 2021-01-14 NOTE — PROGRESS NOTES
Patient ID: Saad Lewis is a 35 y o  female with chronic migraine and chronic tension-type headache, who is returning to Neurology office for follow up of her headaches  Assessment/Plan:    Chronic tension-type headache, not intractable  Patient has continued to have frequent headaches, though did not start prescribed therapy after her last visit  She has come off of the topiramate but does not believe that this has caused any worsening of her headaches  She did not start magnesium or riboflavin as recommended at her last visit as this was not covered by her insurance  We discussed that these supplements are available over the counter at any pharmacy and are affordable options  She is agreeable to purchasing these over the counter  She will continue on cyproheptadine as this has afforded her with some benefit with the severity of her headaches  Chronic migraine without aura with status migrainosus, not intractable  Patient does continue to have migraine headaches as well as her tension headaches  Her migraine headaches do not occur as frequently as the tension headaches  We discussed that because she may be getting pregnant in the near future, our options for treatment are limited due to possibility to birth defects from medications  Her neurological exam at today's visit is non-focal      We discussed rebound headaches from overuse of NSAID as she is currently taking ibuprofen 1-2 times per day  We discussed that taking NSAIDs so frequently can actually worsen her tension and migraine headaches  Encouraged to limit to no more than 2-3 times per week  She did not tolerate sumatriptan 100 mg dose  Will trial rizatriptan instead  We discussed that some patients may be intolerant to one triptan but may tolerate others  She will continue with compazine and is aware that for a bad headache she can take compazine with ibuprofen and rizatriptan       She has not had an MRI in the past and because she continues to have chronic tension headaches and migraines that may be associated with dizzines, would like to have MRI done to rule out underlying pathology  She has not have her vitamin D level checked from her prior visit, she is agreeable to having her blood work done  Encouraged having her lyme test done that was ordered by PCP as well  She will call the office with any worsening of headaches or if she has a headache that she can not abort with her prescribed medications  She will follow up in 4 months or sooner if needed  Insomnia  Patient continues to have difficulty with sleep  She has not tried taking anything for sleep in the past  Recommended 5 mg melatonin over the counter which could be increased to 10 mg as well  There is some evidence that melatonin can be helpful for headaches as well  Constipation  Patient continues to struggle with constipation  Notes that miralax is helpful which was recommended by PCP  We discussed that magnesium is a natural laxative which she may find is beneficial to her constipation  Vapes nicotine containing substance  Patient notes that she is no longer smoking cigarettes  She has been using a vape device to try to quit  She has been using this very sparingly and is working on stopping  This was encouraged  Subjective:  Abdirahman Gee is a 35 y o  female with chronic migraine and chronic tension-type headache, who is returning to Neurology office for follow up of her headaches  Of note, she also has a history of Lyme disease in 2019 s/p treatment  She was seen in the office on 10/30/2020 by Dr Darcy Snyder for original consult  At that time, she was trying to get pregnant so she wasto wean off of topiramate  Started on cyproheptadine at bedtime  Noted taht if cyproheptadine were to fail, consider SNRI  For abortive therapi she was to take sumatriptan + prochlorperazine + naproxen   If she were to still have a headache at that time she was to take decadron 2 mg with food  Vitamin D was ordered but has not been completed to date  Since her last visit, she has come off of the topiramate  She believes that the topiramate had not been helpful to begin with  Believes that her headaches are of the same frequency  She has not started the riboflavin or magnesium because her insurance does not pay for them  She is willing to purchase these over the counter  Takes cyproheptadine 2 tablets at night  She does feel that sometimes it is helpful and at other times it does not seem helpful  Continues to not use birth control  Is off of Depo PRovera for about one year and is somewhat trying to get pregnant  She has been having some trouble sleeping at night  Has not tried anything for sleep  Stress has been controlled  She may have some anxiety but denies any depression  She continues to have joint pain but has not had her lyme test done from September  SHe is going to go to the lab to get her blood work done  Tried higher dose of sumatriptan (was on 50 mg in the past but was not helpful) but it made her feel very dizzy and faint  Willing to try another triptan  The compazine does help a little bit, may take 2-3 ibuprofen after if it does not help  Takes ibuprofen 1-2 times per day  Water intake is better, drinks about 7 bottles of water per day  Staying away from soda  Diet is getting better as well, avoiding processed foods  Has been exercising  Headaches are usually over the right side of her head and may move towards the back of her head, when they are bad they are more in the middle of her head  Throbbing/sharp pain  3-8/10 pain  One to two times per month she can not function from her headache  May have dizziness with a bad headache, no other symptoms  She had an MRI ordered by PCP in the past but was not completed as it was not covered by her insurance  She is trying to quit smoking   She does still vape occasionally but is working on stopping  19 significant headaches in November  18 significant headaches in December  So far in January she has had 10 significant headaches  Most other days she may have slight headaches that go away without medication  Not currently pregnant  No new health or medical issues since her last visit  Current migraine medications:  - cyproheptadine 4 mg tablet- 2  tablets at night  - magnesium 400 mg tablet- 1 tablet twice per day  - Riboflavin 100 mg tablets- 2 tablets twice per day   - sumatriptan 100 mg + prochlorperazine 10 mg + naproxen 440 mg PRN for headache abortive  I reviewed [facility] and summarized above  The following portions of the patient's history were reviewed and updated as appropriate: allergies, current medications, past family history, past medical history, past social history, past surgical history and problem list      Objective:    Blood pressure 110/64, pulse 86, height 4' 10" (1 473 m), weight 67 6 kg (149 lb), not currently breastfeeding  Physical Exam  No apparent distress  Appears well nourished  Mood appropriate for situation     Neurologic Exam  Mental status- alert and oriented to person, place, and time  Speech appropriate for conversation  Good attention and knowledge  Cranial Nerves- PERRL, EOMS normal, facial sensation and muscles symmetric, hearing intact bilaterally to finger rubs, tongue midline, palate rise symmetrical, shoulder shrug symmetrical     Motor- No pronator drift  Appropriate strength  Moves all extremities freely  No tremor  Sensory-  Intact distally in all extremities to light touch  DTRs- 2+ and symmetric in all extremities  Gait- normal casual gait  Coordination- FNF intact  ROS:    Review of Systems   Constitutional: Negative  Negative for appetite change and fever  HENT: Negative  Negative for hearing loss, tinnitus, trouble swallowing and voice change  Eyes: Positive for photophobia  Negative for pain  Respiratory: Negative  Negative for shortness of breath  Cardiovascular: Negative  Negative for palpitations  Gastrointestinal: Negative  Negative for nausea and vomiting  Endocrine: Negative  Negative for cold intolerance  Genitourinary: Negative  Negative for dysuria, frequency and urgency  Musculoskeletal: Negative  Negative for myalgias and neck pain  Skin: Negative  Negative for rash  Allergic/Immunologic: Negative  Neurological: Positive for dizziness, light-headedness and headaches  Negative for tremors, seizures, syncope, facial asymmetry, speech difficulty, weakness and numbness  Hematological: Negative  Does not bruise/bleed easily  Psychiatric/Behavioral: Negative  Negative for confusion, hallucinations and sleep disturbance  ROS obtained by MA and reviewed by myself

## 2021-01-14 NOTE — ASSESSMENT & PLAN NOTE
Patient continues to have difficulty with sleep  She has not tried taking anything for sleep in the past  Recommended 5 mg melatonin over the counter which could be increased to 10 mg as well  There is some evidence that melatonin can be helpful for headaches as well

## 2021-01-14 NOTE — ASSESSMENT & PLAN NOTE
Patient notes that she is no longer smoking cigarettes  She has been using a vape device to try to quit  She has been using this very sparingly and is working on stopping  This was encouraged

## 2021-01-14 NOTE — PATIENT INSTRUCTIONS
1  Please purchase magnesium and riboflavin (B2) over the counter at any pharmacy  - magnesium 400 mg twice per day   - RIboflavin 200 mg twice per day  2  Continue cyproheptadine 2 tablets at nighttime  3  Please have your blood work done  4  Start taking Melatonin 5 mg at night time before bed, also over the counter at any pharmacy  5  Limit ibuprofen to 2-3 times per week to avoid rebound headache  6  Try taking rizatriptan instead of sumatriptan at the onset of headache - can be taken with compazine and ibuprofen if needed  7  Call the office if headaches are worse or if you have a headache that you can not get rid of   8  Follow up in 4 months or sooner if needed  9  Have MRI done

## 2021-01-14 NOTE — ASSESSMENT & PLAN NOTE
Patient does continue to have migraine headaches as well as her tension headaches  Her migraine headaches do not occur as frequently as the tension headaches  We discussed that because she may be getting pregnant in the near future, our options for treatment are limited due to possibility to birth defects from medications  Her neurological exam at today's visit is non-focal      We discussed rebound headaches from overuse of NSAID as she is currently taking ibuprofen 1-2 times per day  We discussed that taking NSAIDs so frequently can actually worsen her tension and migraine headaches  Encouraged to limit to no more than 2-3 times per week  She did not tolerate sumatriptan 100 mg dose  Will trial rizatriptan instead  We discussed that some patients may be intolerant to one triptan but may tolerate others  She will continue with compazine and is aware that for a bad headache she can take compazine with ibuprofen and rizatriptan  She has not had an MRI in the past and because she continues to have chronic tension headaches and migraines that may be associated with dizzines, would like to have MRI done to rule out underlying pathology  She has not have her vitamin D level checked from her prior visit, she is agreeable to having her blood work done  Encouraged having her lyme test done that was ordered by PCP as well  She will call the office with any worsening of headaches or if she has a headache that she can not abort with her prescribed medications  She will follow up in 4 months or sooner if needed

## 2021-01-14 NOTE — ASSESSMENT & PLAN NOTE
Patient has continued to have frequent headaches, though did not start prescribed therapy after her last visit  She has come off of the topiramate but does not believe that this has caused any worsening of her headaches  She did not start magnesium or riboflavin as recommended at her last visit as this was not covered by her insurance  We discussed that these supplements are available over the counter at any pharmacy and are affordable options  She is agreeable to purchasing these over the counter  She will continue on cyproheptadine as this has afforded her with some benefit with the severity of her headaches

## 2021-01-14 NOTE — ASSESSMENT & PLAN NOTE
Patient continues to struggle with constipation  Notes that miralax is helpful which was recommended by PCP  We discussed that magnesium is a natural laxative which she may find is beneficial to her constipation

## 2021-01-28 ENCOUNTER — HOSPITAL ENCOUNTER (OUTPATIENT)
Dept: MRI IMAGING | Facility: HOSPITAL | Age: 31
Discharge: HOME | End: 2021-01-28
Payer: COMMERCIAL

## 2021-01-28 DIAGNOSIS — G43.701 CHRONIC MIGRAINE WITHOUT AURA WITH STATUS MIGRAINOSUS, NOT INTRACTABLE: ICD-10-CM

## 2021-01-28 PROCEDURE — G1004 CDSM NDSC: HCPCS

## 2021-01-28 PROCEDURE — A9585 GADOBUTROL INJECTION: HCPCS | Performed by: NURSE PRACTITIONER

## 2021-01-28 PROCEDURE — 70553 MRI BRAIN STEM W/O & W/DYE: CPT

## 2021-01-28 RX ADMIN — GADOBUTROL 6 ML: 604.72 INJECTION INTRAVENOUS at 14:33

## 2021-02-02 ENCOUNTER — TELEPHONE (OUTPATIENT)
Dept: NEUROLOGY | Facility: CLINIC | Age: 31
End: 2021-02-02

## 2021-02-02 NOTE — TELEPHONE ENCOUNTER
----- Message from Power Muro sent at 2/1/2021  3:56 PM EST -----  Please let patient know that her MRI is normal      Thank you  ----- Message -----  From: Interface, Radiology Results In  Sent: 2/1/2021  10:31 AM EST  To: [place]
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
## 2021-04-13 ENCOUNTER — TELEPHONE (OUTPATIENT)
Dept: NEUROLOGY | Facility: CLINIC | Age: 31
End: 2021-04-13

## 2021-04-13 DIAGNOSIS — G43.701 CHRONIC MIGRAINE WITHOUT AURA WITH STATUS MIGRAINOSUS, NOT INTRACTABLE: Primary | ICD-10-CM

## 2021-04-13 RX ORDER — DEXAMETHASONE 2 MG/1
2 TABLET ORAL
Qty: 5 TABLET | Refills: 0 | Status: SHIPPED | OUTPATIENT
Start: 2021-04-13 | End: 2021-08-17 | Stop reason: ALTCHOICE

## 2021-04-13 NOTE — TELEPHONE ENCOUNTER
Spoke to patient  Agreeable to steroids  Patient is not pregnant or possibly pregnant       Please send to louisa

## 2021-04-13 NOTE — TELEPHONE ENCOUNTER
pt called, has had a migraine for 5 days  8/10  No other symptoms  states that she is not taking mag an ribofalvin as it was not covered by her insurance  i made her aware that these are available OTC and reveiwed doses per dr U[facility] office note  In [place] she was, prescribed rizatriptan  Pt thinks that this was not covered by insurance  Called pharm   Pt never picked up, she will get this ready for pt  Melatonin 5mg hs  Not taking cyproheptadine-she is not sure if that was helping her  Pt not taking any other daily preventative medications    Has never taken depakote, decadron or olanzapine  Please advise  [phone] to leave detailed message

## 2021-04-16 ENCOUNTER — HOSPITAL ENCOUNTER (EMERGENCY)
Facility: HOSPITAL | Age: 31
Discharge: HOME | End: 2021-04-16
Attending: EMERGENCY MEDICINE
Payer: COMMERCIAL

## 2021-04-16 VITALS
WEIGHT: 145.5 LBS | OXYGEN SATURATION: 99 % | RESPIRATION RATE: 16 BRPM | HEART RATE: 85 BPM | TEMPERATURE: 98.2 F | BODY MASS INDEX: 30.41 KG/M2 | SYSTOLIC BLOOD PRESSURE: 110 MMHG | DIASTOLIC BLOOD PRESSURE: 70 MMHG

## 2021-04-16 DIAGNOSIS — J30.9 ALLERGIC RHINITIS: Primary | ICD-10-CM

## 2021-04-16 PROCEDURE — 99282 EMERGENCY DEPT VISIT SF MDM: CPT

## 2021-04-16 PROCEDURE — 99282 EMERGENCY DEPT VISIT SF MDM: CPT | Performed by: EMERGENCY MEDICINE

## 2021-04-16 RX ORDER — PSEUDOEPHEDRINE HYDROCHLORIDE 30 MG/1
30 TABLET ORAL EVERY 8 HOURS PRN
Qty: 30 TABLET | Refills: 0 | Status: SHIPPED | OUTPATIENT
Start: 2021-04-16 | End: 2022-05-27

## 2021-04-16 RX ORDER — LORATADINE 10 MG/1
10 TABLET ORAL DAILY
Qty: 20 TABLET | Refills: 0 | Status: SHIPPED | OUTPATIENT
Start: 2021-04-16 | End: 2022-05-27

## 2021-04-16 NOTE — ED PROVIDER NOTES
History  Chief Complaint   Patient presents with    Sore Throat     sore throat for 2 days     Patient is a 67-year-old female who is complaining of a 2 day history of scratchy throat and runny nose  Took VICS daytime today without any relief  Denies any fever no cough  No difficulty swallowing  No history of similar symptoms in the past   Does admit that she just got a CT 2 days ago  Prior to Admission Medications   Prescriptions Last Dose Informant Patient Reported? Taking? Riboflavin (Vitamin B-2) 100 MG TABS   No No   Si in the morning and 2 at bedtime   Patient not taking: Reported on 2021   cyproheptadine (PERIACTIN) 4 mg tablet   No No   Si tab HS X 2 weeks, if not better you can increase to 2 tabs HS  dexamethasone (DECADRON) 2 mg tablet   No No   Sig: Take 1 tablet (2 mg total) by mouth daily with breakfast   magnesium oxide (MAG-OX) 400 mg   No No   Si tab twice a day   Patient not taking: Reported on 2021   polyethylene glycol (GLYCOLAX) 17 GM/SCOOP powder   No No   Sig: Take 17 g by mouth daily   prochlorperazine (COMPAZINE) 10 mg tablet   No No   Si tab at onset of migraine, can repeat in 8 hours, can take with triptan/NSAID   rizatriptan (MAXALT) 5 MG tablet   No No   Sig: Take 1 tablet (5 mg total) by mouth once as needed for migraine for up to 1 dose May repeat in 2 hours if needed      Facility-Administered Medications: None       Past Medical History:   Diagnosis Date    Frequent headaches        Past Surgical History:   Procedure Laterality Date    NO PAST SURGERIES         Family History   Problem Relation Age of Onset    No Known Problems Mother     No Known Problems Father      I have reviewed and agree with the history as documented      E-Cigarette/Vaping    E-Cigarette Use Never User      E-Cigarette/Vaping Substances    Nicotine No     THC No     CBD No     Flavoring No     Other No     Unknown No      Social History     Tobacco Use    Smoking status: Former Smoker     Packs/day: 0 00     Types: Cigarettes    Smokeless tobacco: Never Used    Tobacco comment: 3 cigarettes a day    Substance Use Topics    Alcohol use: Not Currently    Drug use: Not Currently       Review of Systems   Constitutional: Negative  HENT: Positive for congestion and sore throat  Eyes: Negative  Respiratory: Negative  Negative for cough  Cardiovascular: Negative  Gastrointestinal: Negative  Endocrine: Negative  Genitourinary: Negative  Musculoskeletal: Negative  Skin: Negative  Allergic/Immunologic: Negative  Neurological: Negative  Hematological: Negative  Psychiatric/Behavioral: Negative  All other systems reviewed and are negative  Physical Exam  Physical Exam  Vitals signs and nursing note reviewed  Constitutional:       Appearance: She is well-developed and normal weight  HENT:      Head: Normocephalic and atraumatic  Right Ear: Tympanic membrane and ear canal normal       Left Ear: Tympanic membrane and ear canal normal       Nose: Congestion and rhinorrhea present  Mouth/Throat:      Mouth: Mucous membranes are moist  No oral lesions  Pharynx: No pharyngeal swelling, oropharyngeal exudate or posterior oropharyngeal erythema  Tonsils: No tonsillar exudate or tonsillar abscesses  Eyes:      Conjunctiva/sclera: Conjunctivae normal       Pupils: Pupils are equal, round, and reactive to light  Neck:      Musculoskeletal: Normal range of motion and neck supple  Cardiovascular:      Rate and Rhythm: Normal rate and regular rhythm  Heart sounds: Normal heart sounds  Pulmonary:      Effort: Pulmonary effort is normal       Breath sounds: Normal breath sounds  Abdominal:      General: Bowel sounds are normal       Palpations: Abdomen is soft  Lymphadenopathy:      Cervical: No cervical adenopathy  Skin:     General: Skin is warm and dry        Capillary Refill: Capillary refill takes less than 2 seconds  Neurological:      General: No focal deficit present  Mental Status: She is alert and oriented to person, place, and time  Psychiatric:         Mood and Affect: Mood normal          Behavior: Behavior normal          Vital Signs  ED Triage Vitals [04/16/21 1840]   Temperature Pulse Respirations Blood Pressure SpO2   98 2 °F (36 8 °C) 85 16 110/70 99 %      Temp Source Heart Rate Source Patient Position - Orthostatic VS BP Location FiO2 (%)   Tympanic Monitor Sitting Left arm --      Pain Score       6           Vitals:    04/16/21 1840   BP: 110/70   Pulse: 85   Patient Position - Orthostatic VS: Sitting         Visual Acuity      ED Medications  Medications - No data to display    Diagnostic Studies  Results Reviewed     None                 No orders to display              Procedures  Procedures         ED Course                                           MDM    Disposition  Final diagnoses: Allergic rhinitis     Time reflects when diagnosis was documented in both MDM as applicable and the Disposition within this note     Time User Action Codes Description Comment    4/16/2021  6:48 PM Giovana Adair Add [J30 9] Allergic rhinitis       ED Disposition     ED Disposition Condition Date/Time Comment    Discharge Stable Fri Apr 16, 2021  6:48 PM [address] discharge to home/self care              Follow-up Information     Follow up With Specialties Details Why Contact Info    [place], [address] [facility], Nurse Practitioner   [id number]  [address] [facility] [address]             Patient's Medications   Discharge Prescriptions    LORATADINE (CLARITIN) 10 MG TABLET    Take 1 tablet (10 mg total) by mouth daily       Start Date: 4/16/2021 End Date: --       Order Dose: 10 mg       Quantity: 20 tablet    Refills: 0    PSEUDOEPHEDRINE (SUDAFED) 30 MG TABLET    Take 1 tablet (30 mg total) by mouth every 8 (eight) hours as needed for congestion Start Date: 4/16/2021 End Date: --       Order Dose: 30 mg       Quantity: 30 tablet    Refills: 0     No discharge procedures on file      PDMP Review       Value Time User    PDMP Reviewed  Yes 10/1/2020 12:38 AM Kaykay James DO          ED Provider  Electronically Signed by           Mariluz Stevens MD  04/16/21 6045

## 2021-04-19 ENCOUNTER — APPOINTMENT (OUTPATIENT)
Dept: LAB | Facility: CLINIC | Age: 31
End: 2021-04-19
Payer: COMMERCIAL

## 2021-04-19 DIAGNOSIS — G43.009 MIGRAINE WITHOUT AURA AND WITHOUT STATUS MIGRAINOSUS, NOT INTRACTABLE: ICD-10-CM

## 2021-04-19 DIAGNOSIS — E55.9 VITAMIN D DEFICIENCY: ICD-10-CM

## 2021-04-19 DIAGNOSIS — A69.20 LYME DISEASE: ICD-10-CM

## 2021-04-19 LAB
25(OH)D3 SERPL-MCNC: 27.6 NG/ML (ref 30–100)
ALBUMIN SERPL BCP-MCNC: 4.2 G/DL (ref 3.5–5)
ALP SERPL-CCNC: 72 U/L (ref 46–116)
ALT SERPL W P-5'-P-CCNC: 26 U/L (ref 12–78)
ANION GAP SERPL CALCULATED.3IONS-SCNC: 4 MMOL/L (ref 4–13)
AST SERPL W P-5'-P-CCNC: 19 U/L (ref 5–45)
BILIRUB SERPL-MCNC: 0.29 MG/DL (ref 0.2–1)
BUN SERPL-MCNC: 9 MG/DL (ref 5–25)
CALCIUM SERPL-MCNC: 9.7 MG/DL (ref 8.3–10.1)
CHLORIDE SERPL-SCNC: 106 MMOL/L (ref 100–108)
CO2 SERPL-SCNC: 30 MMOL/L (ref 21–32)
CREAT SERPL-MCNC: 0.97 MG/DL (ref 0.6–1.3)
EST. AVERAGE GLUCOSE BLD GHB EST-MCNC: 103 MG/DL
GFR SERPL CREATININE-BSD FRML MDRD: 79 ML/MIN/1.73SQ M
GLUCOSE P FAST SERPL-MCNC: 105 MG/DL (ref 65–99)
HBA1C MFR BLD: 5.2 %
POTASSIUM SERPL-SCNC: 3.8 MMOL/L (ref 3.5–5.3)
PROT SERPL-MCNC: 8.3 G/DL (ref 6.4–8.2)
SODIUM SERPL-SCNC: 140 MMOL/L (ref 136–145)

## 2021-04-19 PROCEDURE — 36415 COLL VENOUS BLD VENIPUNCTURE: CPT

## 2021-04-19 PROCEDURE — 86617 LYME DISEASE ANTIBODY: CPT

## 2021-04-19 PROCEDURE — 83036 HEMOGLOBIN GLYCOSYLATED A1C: CPT

## 2021-04-19 PROCEDURE — 80053 COMPREHEN METABOLIC PANEL: CPT

## 2021-04-19 PROCEDURE — 86618 LYME DISEASE ANTIBODY: CPT

## 2021-04-19 PROCEDURE — 82306 VITAMIN D 25 HYDROXY: CPT

## 2021-04-20 LAB — B BURGDOR IGG+IGM SER-ACNC: 340

## 2021-04-21 LAB
B BURGDOR IGG PATRN SER IB-IMP: POSITIVE
B BURGDOR IGM PATRN SER IB-IMP: NEGATIVE
B BURGDOR18KD IGG SER QL IB: PRESENT
B BURGDOR23KD IGG SER QL IB: ABNORMAL
B BURGDOR23KD IGM SER QL IB: PRESENT
B BURGDOR28KD IGG SER QL IB: PRESENT
B BURGDOR30KD IGG SER QL IB: PRESENT
B BURGDOR39KD IGG SER QL IB: PRESENT
B BURGDOR39KD IGM SER QL IB: ABNORMAL
B BURGDOR41KD IGG SER QL IB: PRESENT
B BURGDOR41KD IGM SER QL IB: ABNORMAL
B BURGDOR45KD IGG SER QL IB: ABNORMAL
B BURGDOR58KD IGG SER QL IB: PRESENT
B BURGDOR66KD IGG SER QL IB: PRESENT
B BURGDOR93KD IGG SER QL IB: PRESENT

## 2021-04-22 ENCOUNTER — TELEPHONE (OUTPATIENT)
Dept: NEUROLOGY | Facility: CLINIC | Age: 31
End: 2021-04-22

## 2021-04-22 NOTE — TELEPHONE ENCOUNTER
I called and left a voicemail message for the patient to return my call about her appt with Yarely Flaherty for 5/26/21 @ 12:45 pm has been cancelled due to Matilde being out of the office  Please call back to reschedule   I left my name and number

## 2021-04-27 DIAGNOSIS — G43.701 CHRONIC MIGRAINE WITHOUT AURA WITH STATUS MIGRAINOSUS, NOT INTRACTABLE: ICD-10-CM

## 2021-04-27 DIAGNOSIS — K59.00 CONSTIPATION, UNSPECIFIED CONSTIPATION TYPE: ICD-10-CM

## 2021-04-27 RX ORDER — CYPROHEPTADINE HYDROCHLORIDE 4 MG/1
TABLET ORAL
Qty: 60 TABLET | Refills: 3 | Status: SHIPPED | OUTPATIENT
Start: 2021-04-27

## 2021-04-27 RX ORDER — POLYETHYLENE GLYCOL 3350 17 G/17G
17 POWDER, FOR SOLUTION ORAL DAILY
Qty: 507 G | Refills: 0 | OUTPATIENT
Start: 2021-04-27

## 2021-04-27 RX ORDER — DEXAMETHASONE 2 MG/1
2 TABLET ORAL
Qty: 5 TABLET | Refills: 0 | OUTPATIENT
Start: 2021-04-27

## 2021-04-27 RX ORDER — RIZATRIPTAN BENZOATE 5 MG/1
5 TABLET ORAL ONCE AS NEEDED
Qty: 9 TABLET | Refills: 0 | Status: SHIPPED | OUTPATIENT
Start: 2021-04-27 | End: 2021-08-17 | Stop reason: SDUPTHER

## 2021-04-27 NOTE — TELEPHONE ENCOUNTER
We can not do the decadron again as it is too soon  Please see if she is currently pregnant  Please see if she has been taking the mag and B2  Also inquire her dose of cyproheptadine  Dr Lucy Pimentel recommended increasing to 8 mg HS if not helpful  See what she has been taking for abortive  Has she been overusing OTC medications?

## 2021-04-27 NOTE — TELEPHONE ENCOUNTER
Received a refill request from pharmacy for decadron  Called patient to inquire  Irving Gomezgillian that she still has the migraine that was discussed on 4/13  Said that she still has pressure behind both eyes and pain in both temples  Pain is 6/10  Did say that the steroid did help  Advised that we don't like patients to be on steroids for long periods of time  She did not  her rizatriptan yet  Did make her aware that it is ready for her to   Please advise    She is agreeable to depakote or olanzapine if you did not want to give her another decadron taper      [phone]

## 2021-04-28 ENCOUNTER — TELEPHONE (OUTPATIENT)
Dept: FAMILY MEDICINE CLINIC | Facility: CLINIC | Age: 31
End: 2021-04-28

## 2021-04-28 ENCOUNTER — TELEPHONE (OUTPATIENT)
Dept: NEUROLOGY | Facility: CLINIC | Age: 31
End: 2021-04-28

## 2021-04-28 RX ORDER — DIVALPROEX SODIUM 250 MG/1
TABLET, DELAYED RELEASE ORAL
Qty: 8 TABLET | Refills: 0 | Status: SHIPPED | OUTPATIENT
Start: 2021-04-28 | End: 2021-08-17 | Stop reason: ALTCHOICE

## 2021-04-28 NOTE — TELEPHONE ENCOUNTER
Spoke to patient  Informed of previous  Requesting information sent to her via [employer]

## 2021-04-28 NOTE — TELEPHONE ENCOUNTER
Appointment Request From: Shelley López     With Provider: LEIGHANN Leary [[facility] [address]     Preferred Date Range: 4/27/2021 - 4/28/2021     Preferred Times: Any Time     Reason for visit: Primary Care Adult Annual Physical     Comments:  yearly check up

## 2021-04-28 NOTE — TELEPHONE ENCOUNTER
Spoke to patient  Informed of previous  Patient is not pregnant  Patient has been taking B2, but has not been taking magnesium or cyproheptadine  She will follow back with pharmacy regarding prescriptions for rizatriptan and cyproheptadine  Patient denies overusing OTC meds for migraines, actually states she rarely has used tylenol

## 2021-04-28 NOTE — TELEPHONE ENCOUNTER
----- Message from Power Muro sent at 4/28/2021  9:31 AM EDT -----  Please let her know that her vitamin D is slightly low

## 2021-04-28 NOTE — TELEPHONE ENCOUNTER
Patient made aware  Asking if you would want her to take any vitamin d supplements      Please advise

## 2021-04-30 ENCOUNTER — IMMUNIZATIONS (OUTPATIENT)
Dept: FAMILY MEDICINE CLINIC | Facility: HOSPITAL | Age: 31
End: 2021-04-30

## 2021-04-30 DIAGNOSIS — Z23 ENCOUNTER FOR IMMUNIZATION: Primary | ICD-10-CM

## 2021-04-30 PROCEDURE — 0011A SARS-COV-2 / COVID-19 MRNA VACCINE (MODERNA) 100 MCG: CPT

## 2021-04-30 PROCEDURE — 91301 SARS-COV-2 / COVID-19 MRNA VACCINE (MODERNA) 100 MCG: CPT

## 2021-05-13 ENCOUNTER — OFFICE VISIT (OUTPATIENT)
Dept: FAMILY MEDICINE CLINIC | Facility: CLINIC | Age: 31
End: 2021-05-13

## 2021-05-13 ENCOUNTER — APPOINTMENT (OUTPATIENT)
Dept: LAB | Facility: CLINIC | Age: 31
End: 2021-05-13
Payer: COMMERCIAL

## 2021-05-13 VITALS
BODY MASS INDEX: 30.14 KG/M2 | WEIGHT: 143.6 LBS | TEMPERATURE: 97.9 F | SYSTOLIC BLOOD PRESSURE: 116 MMHG | HEART RATE: 83 BPM | OXYGEN SATURATION: 98 % | HEIGHT: 58 IN | DIASTOLIC BLOOD PRESSURE: 74 MMHG | RESPIRATION RATE: 18 BRPM

## 2021-05-13 DIAGNOSIS — K59.00 CONSTIPATION, UNSPECIFIED CONSTIPATION TYPE: ICD-10-CM

## 2021-05-13 DIAGNOSIS — Z00.00 ANNUAL PHYSICAL EXAM: Primary | ICD-10-CM

## 2021-05-13 DIAGNOSIS — Z11.3 ROUTINE SCREENING FOR STI (SEXUALLY TRANSMITTED INFECTION): ICD-10-CM

## 2021-05-13 DIAGNOSIS — Z11.4 ENCOUNTER FOR SCREENING FOR HIV: ICD-10-CM

## 2021-05-13 LAB
HAV IGM SER QL: NORMAL
HBV CORE IGM SER QL: NORMAL
HBV SURFACE AG SER QL: NORMAL
HCV AB SER QL: NORMAL

## 2021-05-13 PROCEDURE — 99395 PREV VISIT EST AGE 18-39: CPT | Performed by: NURSE PRACTITIONER

## 2021-05-13 PROCEDURE — 87591 N.GONORRHOEAE DNA AMP PROB: CPT

## 2021-05-13 PROCEDURE — 86592 SYPHILIS TEST NON-TREP QUAL: CPT

## 2021-05-13 PROCEDURE — 36415 COLL VENOUS BLD VENIPUNCTURE: CPT

## 2021-05-13 PROCEDURE — 80074 ACUTE HEPATITIS PANEL: CPT

## 2021-05-13 PROCEDURE — 87389 HIV-1 AG W/HIV-1&-2 AB AG IA: CPT

## 2021-05-13 PROCEDURE — 87491 CHLMYD TRACH DNA AMP PROBE: CPT

## 2021-05-13 RX ORDER — CHOLECALCIFEROL (VITAMIN D3) 125 MCG
2000 CAPSULE ORAL DAILY
COMMUNITY

## 2021-05-13 RX ORDER — LACTULOSE 20 G/30ML
10 SOLUTION ORAL 2 TIMES DAILY
Qty: 946 ML | Refills: 0 | Status: SHIPPED | OUTPATIENT
Start: 2021-05-13 | End: 2022-03-14

## 2021-05-13 RX ORDER — MAGNESIUM 30 MG
400 TABLET ORAL 2 TIMES DAILY
COMMUNITY
End: 2022-04-28

## 2021-05-13 RX ORDER — DOCUSATE SODIUM 100 MG/1
100 CAPSULE, LIQUID FILLED ORAL 2 TIMES DAILY
Qty: 10 CAPSULE | Refills: 0 | Status: SHIPPED | OUTPATIENT
Start: 2021-05-13 | End: 2022-03-14

## 2021-05-13 NOTE — PATIENT INSTRUCTIONS

## 2021-05-13 NOTE — PROGRESS NOTES
[facility] PRACTICE IRAM    NAME: Ana Mao  AGE: 28 y o  SEX: female  : 1990     DATE: 2021     Assessment and Plan:     Problem List Items Addressed This Visit        Other    Constipation     Chronic constipation-reports bowel movement about once or twice per week  Has tried MiraLax, Senokot, Colace, fiber without improvement symptoms  Reports that she has tried bisacodyl tabs and this has worked, however gives her significant abdominal cramping  On exam she has tenderness in the lower abdomen-area is firm; will send her for KUB to assess for stool burden  At this time recommend she use Metamucil b i d  and did try lactulose for intermittent relief  Referral placed to Gastroenterology for further evaluation and treatment         Relevant Medications    docusate sodium (COLACE) 100 mg capsule    psyllium (METAMUCIL SMOOTH TEXTURE) 28 % packet    lactulose 20 g/30 mL    Other Relevant Orders    Ambulatory referral to Gastroenterology    XR abdomen 1 view kub      Other Visit Diagnoses     Annual physical exam    -  Primary    Routine screening for STI (sexually transmitted infection)        Relevant Orders    RPR    Chlamydia/GC amplified DNA by PCR    Hepatitis panel, acute    Encounter for screening for HIV        Relevant Orders    HIV 1/2 Antigen/Antibody (4th Generation) w Reflex [facility]          Immunizations and preventive care screenings were discussed with patient today  Appropriate education was printed on patient's after visit summary  Counseling:  Alcohol/drug use: discussed moderation in alcohol intake, the recommendations for healthy alcohol use, and avoidance of illicit drug use  Dental Health: discussed importance of regular tooth brushing, flossing, and dental visits    Injury prevention: discussed safety/seat belts, safety helmets, smoke detectors, carbon dioxide detectors, and smoking near bedding or upholstery  Sexual health: discussed sexually transmitted diseases, partner selection, use of condoms, avoidance of unintended pregnancy, and contraceptive alternatives  · Exercise: the importance of regular exercise/physical activity was discussed  Recommend exercise 3-5 times per week for at least 30 minutes  BMI Counseling: Body mass index is 30 01 kg/m²  The BMI is above normal  Nutrition recommendations include decreasing portion sizes, decreasing fast food intake, consuming healthier snacks and limiting drinks that contain sugar  Exercise recommendations include exercising 3-5 times per week  Return in 6 months (on 11/13/2021) for Recheck  Chief Complaint:     Chief Complaint   Patient presents with    Physical Exam     27 y/o     Constipation     "as per pt she saw her neurologist which advised to start magnesium" pt states the other day she went to use the bathroom and saw blood      History of Present Illness:     Adult Annual Physical   Patient here for a comprehensive physical exam  The patient reports problems - Constipation  Patient states that she has had chronic constipation for several years now  She has tried many medications and over-the-counter treatments which have not been effective  She currently has been using bisacodyl periodically to promote bowel movement  She states that this is effective however causes significant abdominal cramping  She is currently having a bowel movement 1 or 2 times per week  She states that she does have to strain to have a bowel movement  She denies any blood in the stool, nausea, vomiting  She has never seen the gastroenterologist     Diet and Physical Activity  · Diet/Nutrition: well balanced diet  · Exercise: no formal exercise        Depression Screening  PHQ-9 Depression Screening    PHQ-9:   Frequency of the following problems over the past two weeks:      Little interest or pleasure in doing things: 0 - not at all  Feeling down, depressed, or hopeless: 0 - not at all  PHQ-2 Score: 0       General Health  · Sleep: sleeps well  · Hearing: normal - bilateral   · Vision: no vision problems  · Dental: regular dental visits  /GYN Health  · Last menstrual period: 4/14/2021  · Contraceptive method: barrier methods  · History of STDs?: no      Review of Systems:     Review of Systems   Constitutional: Negative for chills and fever  HENT: Negative for ear pain and sore throat  Eyes: Negative for pain and visual disturbance  Respiratory: Negative for cough and shortness of breath  Cardiovascular: Negative for chest pain and palpitations  Gastrointestinal: Positive for abdominal distention, abdominal pain and constipation  Negative for anal bleeding, blood in stool, diarrhea, nausea, rectal pain and vomiting  Genitourinary: Negative for dysuria and hematuria  Musculoskeletal: Negative for arthralgias and back pain  Skin: Negative for color change and rash  Neurological: Negative for seizures and syncope  All other systems reviewed and are negative       Past Medical History:     Past Medical History:   Diagnosis Date    Frequent headaches       Past Surgical History:     Past Surgical History:   Procedure Laterality Date    NO PAST SURGERIES        Social History:     E-Cigarette/Vaping    E-Cigarette Use Never User      E-Cigarette/Vaping Substances    Nicotine No     THC No     CBD No     Flavoring No     Other No     Unknown No      Social History     Socioeconomic History    Marital status: Single     Spouse name: None    Number of children: None    Years of education: None    Highest education level: None   Occupational History    None   Social Needs    Financial resource strain: None    Food insecurity     Worry: None     Inability: None    Transportation needs     Medical: None     Non-medical: None   Tobacco Use    Smoking status: Former Smoker     Packs/day: 0 00     Types: Cigarettes    Smokeless tobacco: Never Used    Tobacco comment: 3 cigarettes a day    Substance and Sexual Activity    Alcohol use: Not Currently    Drug use: Not Currently    Sexual activity: None   Lifestyle    Physical activity     Days per week: None     Minutes per session: None    Stress: None   Relationships    Social connections     Talks on phone: None     Gets together: None     Attends [employer] service: None     Active member of club or organization: None     Attends meetings of clubs or organizations: None     Relationship status: None    Intimate partner violence     Fear of current or ex partner: None     Emotionally abused: None     Physically abused: None     Forced sexual activity: None   Other Topics Concern    None   Social History Narrative    ** Merged History Encounter **         Flu shot:no      Family History:     Family History   Problem Relation Age of Onset    No Known Problems Mother     No Known Problems Father       Current Medications:     Current Outpatient Medications   Medication Sig Dispense Refill    Cholecalciferol (Vitamin D3) 50 MCG (2000 UT) TABS Take 2,000 Units by mouth daily      cyproheptadine (PERIACTIN) 4 mg tablet Take 2 tablets at night 60 tablet 3    loratadine (CLARITIN) 10 mg tablet Take 1 tablet (10 mg total) by mouth daily 20 tablet 0    magnesium 30 MG tablet Take 400 mg by mouth 2 (two) times a day      magnesium oxide (MAG-OX) 400 mg 1 tab twice a day 180 tablet 1    Riboflavin (Vitamin B-2) 100 MG TABS 2 in the morning and 2 at bedtime 120 tablet 6    dexamethasone (DECADRON) 2 mg tablet Take 1 tablet (2 mg total) by mouth daily with breakfast (Patient not taking: Reported on 5/13/2021) 5 tablet 0    divalproex sodium (DEPAKOTE) 250 mg EC tablet Take 2 tablets at night for three nights  Then take 1 tablet at night for two nights and stop   (Patient not taking: Reported on 5/13/2021) 8 tablet 0    docusate sodium (COLACE) 100 mg capsule Take 1 capsule (100 mg total) by mouth 2 (two) times a day 10 capsule 0    lactulose 20 g/30 mL Take 15 mL (10 g total) by mouth 2 (two) times a day 946 mL 0    polyethylene glycol (GLYCOLAX) 17 GM/SCOOP powder Take 17 g by mouth daily (Patient not taking: Reported on 5/13/2021) 507 g 2    prochlorperazine (COMPAZINE) 10 mg tablet 1 tab at onset of migraine, can repeat in 8 hours, can take with triptan/NSAID (Patient not taking: Reported on 5/13/2021) 20 tablet 3    pseudoephedrine (SUDAFED) 30 mg tablet Take 1 tablet (30 mg total) by mouth every 8 (eight) hours as needed for congestion (Patient not taking: Reported on 5/13/2021) 30 tablet 0    psyllium (METAMUCIL SMOOTH TEXTURE) 28 % packet Take 1 packet by mouth 2 (two) times a day 60 each 2    rizatriptan (MAXALT) 5 MG tablet Take 1 tablet (5 mg total) by mouth once as needed for migraine for up to 1 dose May repeat in 2 hours if needed (Patient not taking: Reported on 5/13/2021) 9 tablet 0     No current facility-administered medications for this visit  Allergies: Allergies   Allergen Reactions    Apple - Food Allergy Anaphylaxis      Physical Exam:     /74 (BP Location: Left arm, Patient Position: Sitting, Cuff Size: Standard)   Pulse 83   Temp 97 9 °F (36 6 °C) (Temporal)   Resp 18   Ht 4' 10" (1 473 m)   Wt 65 1 kg (143 lb 9 6 oz)   LMP 04/14/2021   SpO2 98%   BMI 30 01 kg/m²     Physical Exam  Vitals signs and nursing note reviewed  Constitutional:       General: She is not in acute distress  Appearance: She is well-developed  HENT:      Head: Normocephalic and atraumatic  Right Ear: External ear normal       Left Ear: External ear normal    Eyes:      Extraocular Movements: Extraocular movements intact  Conjunctiva/sclera: Conjunctivae normal       Pupils: Pupils are equal, round, and reactive to light  Neck:      Musculoskeletal: Normal range of motion and neck supple     Cardiovascular:      Rate and Rhythm: Normal rate and regular rhythm  Pulses: Normal pulses  Heart sounds: Normal heart sounds  No murmur  Pulmonary:      Effort: Pulmonary effort is normal  No respiratory distress  Breath sounds: Normal breath sounds  Abdominal:      Palpations: Abdomen is soft  There is no hepatomegaly  Tenderness: There is abdominal tenderness (Lower abdomen)  Musculoskeletal: Normal range of motion  Lymphadenopathy:      Cervical: No cervical adenopathy  Skin:     General: Skin is warm and dry  Neurological:      General: No focal deficit present  Mental Status: She is alert and oriented to person, place, and time     Psychiatric:         Mood and Affect: Mood normal          Behavior: Behavior normal           Thomas Parsons, [facility]

## 2021-05-13 NOTE — ASSESSMENT & PLAN NOTE
Chronic constipation-reports bowel movement about once or twice per week  Has tried MiraLax, Senokot, Colace, fiber without improvement symptoms  Reports that she has tried bisacodyl tabs and this has worked, however gives her significant abdominal cramping  On exam she has tenderness in the lower abdomen-area is firm; will send her for KUB to assess for stool burden  At this time recommend she use Metamucil b i d  and did try lactulose for intermittent relief  Referral placed to Gastroenterology for further evaluation and treatment

## 2021-05-14 LAB
HIV 1+2 AB+HIV1 P24 AG SERPL QL IA: NORMAL
RPR SER QL: NORMAL

## 2021-05-15 LAB
C TRACH DNA SPEC QL NAA+PROBE: NEGATIVE
N GONORRHOEA DNA SPEC QL NAA+PROBE: NEGATIVE

## 2021-05-18 ENCOUNTER — TELEPHONE (OUTPATIENT)
Dept: FAMILY MEDICINE CLINIC | Facility: CLINIC | Age: 31
End: 2021-05-18

## 2021-05-18 DIAGNOSIS — N83.299 COMPLEX OVARIAN CYST: Primary | ICD-10-CM

## 2021-05-22 ENCOUNTER — HOSPITAL ENCOUNTER (OUTPATIENT)
Dept: ULTRASOUND IMAGING | Facility: HOSPITAL | Age: 31
Discharge: HOME | End: 2021-05-22
Payer: COMMERCIAL

## 2021-05-22 DIAGNOSIS — N83.299 COMPLEX OVARIAN CYST: ICD-10-CM

## 2021-05-22 PROCEDURE — 76856 US EXAM PELVIC COMPLETE: CPT

## 2021-05-22 PROCEDURE — 76830 TRANSVAGINAL US NON-OB: CPT

## 2021-05-27 ENCOUNTER — IMMUNIZATIONS (OUTPATIENT)
Dept: FAMILY MEDICINE CLINIC | Facility: HOSPITAL | Age: 31
End: 2021-05-27

## 2021-05-27 DIAGNOSIS — Z23 ENCOUNTER FOR IMMUNIZATION: Primary | ICD-10-CM

## 2021-05-27 PROCEDURE — 0012A SARS-COV-2 / COVID-19 MRNA VACCINE (MODERNA) 100 MCG: CPT

## 2021-05-27 PROCEDURE — 91301 SARS-COV-2 / COVID-19 MRNA VACCINE (MODERNA) 100 MCG: CPT

## 2021-06-08 ENCOUNTER — TELEPHONE (OUTPATIENT)
Dept: NEUROLOGY | Facility: CLINIC | Age: 31
End: 2021-06-08

## 2021-06-08 ENCOUNTER — HOSPITAL ENCOUNTER (EMERGENCY)
Facility: HOSPITAL | Age: 31
Discharge: HOME | End: 2021-06-08
Attending: EMERGENCY MEDICINE | Admitting: EMERGENCY MEDICINE
Payer: COMMERCIAL

## 2021-06-08 VITALS
OXYGEN SATURATION: 98 % | SYSTOLIC BLOOD PRESSURE: 137 MMHG | RESPIRATION RATE: 18 BRPM | BODY MASS INDEX: 29.64 KG/M2 | TEMPERATURE: 98 F | HEART RATE: 78 BPM | DIASTOLIC BLOOD PRESSURE: 89 MMHG | WEIGHT: 141.8 LBS

## 2021-06-08 DIAGNOSIS — G43.809 OTHER MIGRAINE WITHOUT STATUS MIGRAINOSUS, NOT INTRACTABLE: Primary | ICD-10-CM

## 2021-06-08 DIAGNOSIS — G43.701 CHRONIC MIGRAINE WITHOUT AURA WITH STATUS MIGRAINOSUS, NOT INTRACTABLE: Primary | ICD-10-CM

## 2021-06-08 PROCEDURE — 96375 TX/PRO/DX INJ NEW DRUG ADDON: CPT

## 2021-06-08 PROCEDURE — 96361 HYDRATE IV INFUSION ADD-ON: CPT

## 2021-06-08 PROCEDURE — 99283 EMERGENCY DEPT VISIT LOW MDM: CPT

## 2021-06-08 PROCEDURE — 99284 EMERGENCY DEPT VISIT MOD MDM: CPT | Performed by: PHYSICIAN ASSISTANT

## 2021-06-08 PROCEDURE — 96374 THER/PROPH/DIAG INJ IV PUSH: CPT

## 2021-06-08 RX ORDER — METOCLOPRAMIDE HYDROCHLORIDE 5 MG/ML
10 INJECTION INTRAMUSCULAR; INTRAVENOUS ONCE
Status: COMPLETED | OUTPATIENT
Start: 2021-06-08 | End: 2021-06-08

## 2021-06-08 RX ORDER — DIPHENHYDRAMINE HYDROCHLORIDE 50 MG/ML
25 INJECTION INTRAMUSCULAR; INTRAVENOUS ONCE
Status: COMPLETED | OUTPATIENT
Start: 2021-06-08 | End: 2021-06-08

## 2021-06-08 RX ORDER — KETOROLAC TROMETHAMINE 30 MG/ML
15 INJECTION, SOLUTION INTRAMUSCULAR; INTRAVENOUS ONCE
Status: COMPLETED | OUTPATIENT
Start: 2021-06-08 | End: 2021-06-08

## 2021-06-08 RX ADMIN — METOCLOPRAMIDE 10 MG: 5 INJECTION, SOLUTION INTRAMUSCULAR; INTRAVENOUS at 03:13

## 2021-06-08 RX ADMIN — DIPHENHYDRAMINE HYDROCHLORIDE 25 MG: 50 INJECTION, SOLUTION INTRAMUSCULAR; INTRAVENOUS at 03:16

## 2021-06-08 RX ADMIN — SODIUM CHLORIDE 1000 ML: 0.9 INJECTION, SOLUTION INTRAVENOUS at 03:12

## 2021-06-08 RX ADMIN — KETOROLAC TROMETHAMINE 15 MG: 30 INJECTION, SOLUTION INTRAMUSCULAR; INTRAVENOUS at 03:15

## 2021-06-08 NOTE — TELEPHONE ENCOUNTER
Patient calling to report a migraine that started yesterday  Went to the ED yesterday and received the migraine cocktail but is still experiencing the migraine  Please advise    [phone]    When did it start? Yesterday  Where does your head hurt? Forehead to temples   What does it feel like (sharp, stabbing, etc)? Sharp  Any associated symptoms ( nausea, vomiting, blurred/ double vision, neck pain, sensitivity to light/smell/sound, etc )? Light sensitivity  Is this a typical headache for the patient? No  Rate the pain from 1-10: 7  Anything that makes it worse or triggers a headache? No  Does anything make it better? No  Have you taken anything yet to get rid of the headache? Rizatriptan, ibuprofen, and migraine cocktail  What medications are you currently taking to prevent headache? Has not been taking cyproheptadine  Was not aware of the script being available  Are you willing to try another medication or increase in medication? Yes  What medications are you taking to get rid of a headache when you have one? Rizatriptan: took 2 doses  Have you taken anything in the past for a headache like this that has been helpful (Toradol, steroids, zyprexa, depakote, etc )? Has taken steroids in the past and said that this helps  Unsure if depakote helped in the past     If new headache type or quality such as thunderclap sensation or worst headache of their life or possible stroke symptoms - advise to go to the ER

## 2021-06-08 NOTE — ED ATTENDING ATTESTATION
6/8/2021  I, [facility], saw and evaluated the patient  I have discussed the patient with the resident/non-physician practitioner and agree with the resident's/non-physician practitioner's findings, Plan of Care, and MDM as documented in the resident's/non-physician practitioner's note, except where noted  All available labs and Radiology studies were reviewed  I was present for key portions of any procedure(s) performed by the resident/non-physician practitioner and I was immediately available to provide assistance  At this point I agree with the current assessment done in the Emergency Department        ED Course         Critical Care Time  Procedures

## 2021-06-08 NOTE — ED PROVIDER NOTES
History  Chief Complaint   Patient presents with    Headache     61-year-old female with history of migraine headaches presents complaining of migraine headache for the past few hours  Tells me feels similar to prior migraines  Tried taking her Triptan medication as well as Advil at home without relief  Admits to photophobia and phonophobia without nausea  Denies visual changes  Denies sudden onset  Denies any other complaints  History provided by:  Patient   used: No    Headache  Pain location:  Frontal  Quality:  Sharp  Radiates to:  Does not radiate  Severity at highest:  9/10  Onset quality:  Gradual  Progression:  Worsening  Chronicity:  Recurrent  Similar to prior headaches: yes    Associated symptoms: photophobia    Associated symptoms: no abdominal pain, no cough, no dizziness, no ear pain, no fatigue, no nausea, no neck pain, no neck stiffness, no sore throat, no vomiting and no weakness        Prior to Admission Medications   Prescriptions Last Dose Informant Patient Reported? Taking? Cholecalciferol (Vitamin D3) 50 MCG (2000 UT) TABS  Self Yes No   Sig: Take 2,000 Units by mouth daily   Riboflavin (Vitamin B-2) 100 MG TABS   No No   Si in the morning and 2 at bedtime   cyproheptadine (PERIACTIN) 4 mg tablet   No No   Sig: Take 2 tablets at night   dexamethasone (DECADRON) 2 mg tablet   No No   Sig: Take 1 tablet (2 mg total) by mouth daily with breakfast   Patient not taking: Reported on 2021   divalproex sodium (DEPAKOTE) 250 mg EC tablet   No No   Sig: Take 2 tablets at night for three nights  Then take 1 tablet at night for two nights and stop     Patient not taking: Reported on 2021   docusate sodium (COLACE) 100 mg capsule   No No   Sig: Take 1 capsule (100 mg total) by mouth 2 (two) times a day   lactulose 20 g/30 mL   No No   Sig: Take 15 mL (10 g total) by mouth 2 (two) times a day   loratadine (CLARITIN) 10 mg tablet   No No   Sig: Take 1 tablet (10 mg total) by mouth daily   magnesium 30 MG tablet   Yes No   Sig: Take 400 mg by mouth 2 (two) times a day   magnesium oxide (MAG-OX) 400 mg   No No   Si tab twice a day   polyethylene glycol (GLYCOLAX) 17 GM/SCOOP powder   No No   Sig: Take 17 g by mouth daily   Patient not taking: Reported on 2021   prochlorperazine (COMPAZINE) 10 mg tablet   No No   Si tab at onset of migraine, can repeat in 8 hours, can take with triptan/NSAID   Patient not taking: Reported on 2021   pseudoephedrine (SUDAFED) 30 mg tablet   No No   Sig: Take 1 tablet (30 mg total) by mouth every 8 (eight) hours as needed for congestion   Patient not taking: Reported on 2021   psyllium (METAMUCIL SMOOTH TEXTURE) 28 % packet   No No   Sig: Take 1 packet by mouth 2 (two) times a day   rizatriptan (MAXALT) 5 MG tablet   No No   Sig: Take 1 tablet (5 mg total) by mouth once as needed for migraine for up to 1 dose May repeat in 2 hours if needed   Patient not taking: Reported on 2021      Facility-Administered Medications: None       Past Medical History:   Diagnosis Date    Frequent headaches        Past Surgical History:   Procedure Laterality Date    NO PAST SURGERIES         Family History   Problem Relation Age of Onset    No Known Problems Mother     No Known Problems Father      I have reviewed and agree with the history as documented  E-Cigarette/Vaping    E-Cigarette Use Never User      E-Cigarette/Vaping Substances    Nicotine No     THC No     CBD No     Flavoring No     Other No     Unknown No      Social History     Tobacco Use    Smoking status: Former Smoker     Packs/day: 0 00     Types: Cigarettes    Smokeless tobacco: Never Used    Tobacco comment: 3 cigarettes a day    Substance Use Topics    Alcohol use: Not Currently    Drug use: Not Currently       Review of Systems   Constitutional: Negative  Negative for chills and fatigue  HENT: Negative for ear pain and sore throat      Eyes: Positive for photophobia  Negative for redness  Respiratory: Negative for apnea, cough and shortness of breath  Cardiovascular: Negative for chest pain  Gastrointestinal: Negative for abdominal pain, nausea and vomiting  Genitourinary: Negative for dysuria  Musculoskeletal: Negative for arthralgias, neck pain and neck stiffness  Skin: Negative for rash  Neurological: Positive for headaches  Negative for dizziness, tremors, syncope and weakness  Psychiatric/Behavioral: Negative for suicidal ideas  Physical Exam  Physical Exam  Constitutional:       General: She is not in acute distress  Appearance: She is well-developed  She is not diaphoretic  Eyes:      Pupils: Pupils are equal, round, and reactive to light  Neck:      Musculoskeletal: Normal range of motion and neck supple  Cardiovascular:      Rate and Rhythm: Normal rate and regular rhythm  Pulmonary:      Effort: Pulmonary effort is normal  No respiratory distress  Breath sounds: Normal breath sounds  Abdominal:      General: Bowel sounds are normal  There is no distension  Palpations: Abdomen is soft  Musculoskeletal: Normal range of motion  Skin:     General: Skin is warm and dry  Neurological:      General: No focal deficit present  Mental Status: She is alert and oriented to person, place, and time  Cranial Nerves: No cranial nerve deficit  Sensory: No sensory deficit  Motor: No weakness        Coordination: Coordination normal          Vital Signs  ED Triage Vitals   Temperature Pulse Respirations Blood Pressure SpO2   06/08/21 0254 06/08/21 0254 06/08/21 0254 06/08/21 0254 06/08/21 0254   98 °F (36 7 °C) 78 18 137/89 98 %      Temp Source Heart Rate Source Patient Position - Orthostatic VS BP Location FiO2 (%)   06/08/21 0254 06/08/21 0254 06/08/21 0254 06/08/21 0254 --   Tympanic Monitor Sitting Left arm       Pain Score       06/08/21 0315       Worst Possible Pain           Vitals: 06/08/21 0254   BP: 137/89   Pulse: 78   Patient Position - Orthostatic VS: Sitting         Visual Acuity  Visual Acuity      Most Recent Value   L Pupil Size (mm)  3   R Pupil Size (mm)  3          ED Medications  Medications   metoclopramide (REGLAN) injection 10 mg (10 mg Intravenous Given 6/8/21 0313)   ketorolac (TORADOL) injection 15 mg (15 mg Intravenous Given 6/8/21 0315)   diphenhydrAMINE (BENADRYL) injection 25 mg (25 mg Intravenous Given 6/8/21 0316)   sodium chloride 0 9 % bolus 1,000 mL (1,000 mL Intravenous New Bag 6/8/21 0312)       Diagnostic Studies  Results Reviewed     None                 No orders to display              Procedures  Procedures         ED Course                             SBIRT 22yo+      Most Recent Value   SBIRT (25 yo +)   In order to provide better care to our patients, we are screening all of our patients for alcohol and drug use  Would it be okay to ask you these screening questions? Yes Filed at: 06/08/2021 3444   Initial Alcohol Screen: US AUDIT-C    1  How often do you have a drink containing alcohol?  0 Filed at: 06/08/2021 0318   2  How many drinks containing alcohol do you have on a typical day you are drinking? 0 Filed at: 06/08/2021 0318   3b  FEMALE Any Age, or MALE 65+: How often do you have 4 or more drinks on one occassion? 0 Filed at: 06/08/2021 0318   Audit-C Score  0 Filed at: 06/08/2021 1830   MICHELLE: How many times in the past year have you    Used an illegal drug or used a prescription medication for non-medical reasons? Never Filed at: 06/08/2021 5774                    MDM  Number of Diagnoses or Management Options  Other migraine without status migrainosus, not intractable: established and worsening  Diagnosis management comments: Patient had significant relief of symptoms with medications given in the emergency department  On re-evaluation patient states that she is feeling much better and would like to go home    Educated extensively on supportive care  Stable for discharge home  Agreeable to follow-up with neurologist     Risk of Complications, Morbidity, and/or Mortality  Presenting problems: moderate  Diagnostic procedures: moderate  Management options: moderate    Patient Progress  Patient progress: stable      Disposition  Final diagnoses:   Other migraine without status migrainosus, not intractable     Time reflects when diagnosis was documented in both MDM as applicable and the Disposition within this note     Time User Action Codes Description Comment    6/8/2021  4:15 AM Alison Arzate Add [G43 269] Other migraine without status migrainosus, not intractable       ED Disposition     ED Disposition Condition Date/Time Comment    Discharge Stable Tue Jun 8, 2021  4:15 AM y [facility] discharge to home/self care  Follow-up Information     Follow up With Specialties Details Why Contact Info    Alfie Birmingham, [address], Nurse Practitioner Call  As needed [address]  [address] [place] [address]  [place] [address] [facility]  [phone]            Patient's Medications   Discharge Prescriptions    No medications on file     No discharge procedures on file      PDMP Review       Value Time User    PDMP Reviewed  Yes 10/1/2020 12:38 AM Symone Shay DO          ED Provider  Electronically Signed by           Danii Edwards PA-C  06/08/21 6501

## 2021-06-08 NOTE — DISCHARGE INSTRUCTIONS
Continue taking all medications prescribed by your neurologist   Return for new or worsening complaints  Follow-up with primary care

## 2021-06-09 NOTE — TELEPHONE ENCOUNTER
Spoke to patient  Agreeable to nurtec  Please send to [facility] [address]

## 2021-06-10 RX ORDER — RIMEGEPANT SULFATE 75 MG/75MG
75 TABLET, ORALLY DISINTEGRATING ORAL AS NEEDED
Qty: 8 TABLET | Refills: 3 | Status: SHIPPED | OUTPATIENT
Start: 2021-06-10 | End: 2021-08-17 | Stop reason: SDUPTHER

## 2021-06-11 NOTE — TELEPHONE ENCOUNTER
Fax received from pharmacy  [facility] requires PA  PA initiated via CMM  Key: [id number]    Awaiting determination

## 2021-06-14 ENCOUNTER — PATIENT MESSAGE (OUTPATIENT)
Dept: NEUROLOGY | Facility: CLINIC | Age: 31
End: 2021-06-14

## 2021-06-14 NOTE — TELEPHONE ENCOUNTER
Called pharmacy to discuss  Said that the PA was approved but was currently out of stock  Will have it tomorrow  Patient made aware

## 2021-06-25 ENCOUNTER — TELEPHONE (OUTPATIENT)
Dept: GASTROENTEROLOGY | Facility: MEDICAL CENTER | Age: 31
End: 2021-06-25

## 2021-06-29 ENCOUNTER — HOSPITAL ENCOUNTER (EMERGENCY)
Facility: HOSPITAL | Age: 31
Discharge: HOME | End: 2021-06-29
Attending: EMERGENCY MEDICINE | Admitting: EMERGENCY MEDICINE
Payer: COMMERCIAL

## 2021-06-29 ENCOUNTER — APPOINTMENT (EMERGENCY)
Dept: CT IMAGING | Facility: HOSPITAL | Age: 31
End: 2021-06-29
Payer: COMMERCIAL

## 2021-06-29 VITALS
TEMPERATURE: 98.7 F | OXYGEN SATURATION: 99 % | HEART RATE: 92 BPM | RESPIRATION RATE: 20 BRPM | SYSTOLIC BLOOD PRESSURE: 111 MMHG | WEIGHT: 142.86 LBS | BODY MASS INDEX: 29.86 KG/M2 | DIASTOLIC BLOOD PRESSURE: 72 MMHG

## 2021-06-29 DIAGNOSIS — K62.5 RECTAL BLEEDING: Primary | ICD-10-CM

## 2021-06-29 DIAGNOSIS — K52.9 COLITIS: ICD-10-CM

## 2021-06-29 LAB
ABO GROUP BLD: NORMAL
ALBUMIN SERPL BCP-MCNC: 3.8 G/DL (ref 3–5.2)
ALP SERPL-CCNC: 60 U/L (ref 43–122)
ALT SERPL W P-5'-P-CCNC: 18 U/L
ANION GAP SERPL CALCULATED.3IONS-SCNC: 8 MMOL/L (ref 5–14)
APTT PPP: 29 SECONDS (ref 23–37)
AST SERPL W P-5'-P-CCNC: 37 U/L (ref 14–36)
ATRIAL RATE: 74 BPM
BASOPHILS # BLD AUTO: 0 THOUSANDS/ΜL (ref 0–0.1)
BASOPHILS NFR BLD AUTO: 0 % (ref 0–1)
BILIRUB SERPL-MCNC: 0.21 MG/DL
BLD GP AB SCN SERPL QL: NEGATIVE
BUN SERPL-MCNC: 9 MG/DL (ref 5–25)
CALCIUM SERPL-MCNC: 9.5 MG/DL (ref 8.4–10.2)
CHLORIDE SERPL-SCNC: 106 MMOL/L (ref 97–108)
CO2 SERPL-SCNC: 26 MMOL/L (ref 22–30)
CREAT SERPL-MCNC: 0.79 MG/DL (ref 0.6–1.2)
EOSINOPHIL # BLD AUTO: 0.1 THOUSAND/ΜL (ref 0–0.4)
EOSINOPHIL NFR BLD AUTO: 1 % (ref 0–6)
ERYTHROCYTE [DISTWIDTH] IN BLOOD BY AUTOMATED COUNT: 13.6 %
EXT PREG TEST URINE: NEGATIVE
EXT. CONTROL ED NAV: NORMAL
GFR SERPL CREATININE-BSD FRML MDRD: 101 ML/MIN/1.73SQ M
GLUCOSE SERPL-MCNC: 107 MG/DL (ref 70–99)
HCT VFR BLD AUTO: 40.1 % (ref 36–46)
HGB BLD-MCNC: 13.7 G/DL (ref 12–16)
INR PPP: 0.95 (ref 0.84–1.19)
LYMPHOCYTES # BLD AUTO: 2.2 THOUSANDS/ΜL (ref 0.5–4)
LYMPHOCYTES NFR BLD AUTO: 18 % (ref 25–45)
MCH RBC QN AUTO: 29.7 PG (ref 26–34)
MCHC RBC AUTO-ENTMCNC: 34.2 G/DL (ref 31–36)
MCV RBC AUTO: 87 FL (ref 80–100)
MONOCYTES # BLD AUTO: 0.9 THOUSAND/ΜL (ref 0.2–0.9)
MONOCYTES NFR BLD AUTO: 8 % (ref 1–10)
NEUTROPHILS # BLD AUTO: 8.9 THOUSANDS/ΜL (ref 1.8–7.8)
NEUTS SEG NFR BLD AUTO: 73 % (ref 45–65)
P AXIS: 54 DEGREES
PLATELET # BLD AUTO: 186 THOUSANDS/UL (ref 150–450)
PMV BLD AUTO: 9.6 FL (ref 8.9–12.7)
POTASSIUM SERPL-SCNC: 3.5 MMOL/L (ref 3.6–5)
PR INTERVAL: 130 MS
PROT SERPL-MCNC: 6.7 G/DL (ref 5.9–8.4)
PROTHROMBIN TIME: 12.8 SECONDS (ref 11.6–14.5)
QRS AXIS: 55 DEGREES
QRSD INTERVAL: 80 MS
QT INTERVAL: 402 MS
QTC INTERVAL: 446 MS
RBC # BLD AUTO: 4.61 MILLION/UL (ref 4–5.2)
RH BLD: POSITIVE
SODIUM SERPL-SCNC: 140 MMOL/L (ref 137–147)
SPECIMEN EXPIRATION DATE: NORMAL
T WAVE AXIS: 29 DEGREES
VENTRICULAR RATE: 74 BPM
WBC # BLD AUTO: 12.1 THOUSAND/UL (ref 4.5–11)

## 2021-06-29 PROCEDURE — 85730 THROMBOPLASTIN TIME PARTIAL: CPT | Performed by: EMERGENCY MEDICINE

## 2021-06-29 PROCEDURE — 86901 BLOOD TYPING SEROLOGIC RH(D): CPT | Performed by: EMERGENCY MEDICINE

## 2021-06-29 PROCEDURE — 99285 EMERGENCY DEPT VISIT HI MDM: CPT

## 2021-06-29 PROCEDURE — 81025 URINE PREGNANCY TEST: CPT | Performed by: EMERGENCY MEDICINE

## 2021-06-29 PROCEDURE — 85025 COMPLETE CBC W/AUTO DIFF WBC: CPT | Performed by: EMERGENCY MEDICINE

## 2021-06-29 PROCEDURE — 96365 THER/PROPH/DIAG IV INF INIT: CPT

## 2021-06-29 PROCEDURE — 36415 COLL VENOUS BLD VENIPUNCTURE: CPT | Performed by: EMERGENCY MEDICINE

## 2021-06-29 PROCEDURE — 93010 ELECTROCARDIOGRAM REPORT: CPT | Performed by: INTERNAL MEDICINE

## 2021-06-29 PROCEDURE — 86850 RBC ANTIBODY SCREEN: CPT | Performed by: EMERGENCY MEDICINE

## 2021-06-29 PROCEDURE — C9113 INJ PANTOPRAZOLE SODIUM, VIA: HCPCS | Performed by: EMERGENCY MEDICINE

## 2021-06-29 PROCEDURE — 86900 BLOOD TYPING SEROLOGIC ABO: CPT | Performed by: EMERGENCY MEDICINE

## 2021-06-29 PROCEDURE — 99285 EMERGENCY DEPT VISIT HI MDM: CPT | Performed by: EMERGENCY MEDICINE

## 2021-06-29 PROCEDURE — 96361 HYDRATE IV INFUSION ADD-ON: CPT

## 2021-06-29 PROCEDURE — 93005 ELECTROCARDIOGRAM TRACING: CPT

## 2021-06-29 PROCEDURE — 85610 PROTHROMBIN TIME: CPT | Performed by: EMERGENCY MEDICINE

## 2021-06-29 PROCEDURE — 74177 CT ABD & PELVIS W/CONTRAST: CPT

## 2021-06-29 PROCEDURE — 80053 COMPREHEN METABOLIC PANEL: CPT | Performed by: EMERGENCY MEDICINE

## 2021-06-29 RX ORDER — METRONIDAZOLE 500 MG/1
500 TABLET ORAL ONCE
Status: COMPLETED | OUTPATIENT
Start: 2021-06-29 | End: 2021-06-29

## 2021-06-29 RX ORDER — METRONIDAZOLE 500 MG/1
500 TABLET ORAL EVERY 8 HOURS SCHEDULED
Qty: 30 TABLET | Refills: 0 | Status: SHIPPED | OUTPATIENT
Start: 2021-06-29 | End: 2021-07-09

## 2021-06-29 RX ADMIN — SODIUM CHLORIDE 1000 ML: 0.9 INJECTION, SOLUTION INTRAVENOUS at 22:04

## 2021-06-29 RX ADMIN — SODIUM CHLORIDE 80 MG: 9 INJECTION, SOLUTION INTRAVENOUS at 22:43

## 2021-06-29 RX ADMIN — IOHEXOL 100 ML: 350 INJECTION, SOLUTION INTRAVENOUS at 22:32

## 2021-06-29 RX ADMIN — METRONIDAZOLE 500 MG: 500 TABLET, FILM COATED ORAL at 23:33

## 2021-06-30 NOTE — ED TRIAGE NOTES
Reports she woke up with diarrhea this morning and then throughout the day she has been having diarrhea but its been blood   Reports mid abd sharp pain

## 2021-06-30 NOTE — ED PROVIDER NOTES
History  Chief Complaint   Patient presents with    Rectal Bleeding       History provided by:  Patient  Rectal Bleeding - Minor  Quality:  Bright red  Amount: Moderate  Duration:  1 day  Timing:  Intermittent  Context: diarrhea    Similar prior episodes: no    Relieved by:  Nothing  Worsened by:  Nothing  Ineffective treatments:  None tried  Associated symptoms: abdominal pain    Associated symptoms: no dizziness and no fever        Prior to Admission Medications   Prescriptions Last Dose Informant Patient Reported? Taking? Cholecalciferol (Vitamin D3) 50 MCG (2000 UT) TABS  Self Yes No   Sig: Take 2,000 Units by mouth daily   Riboflavin (Vitamin B-2) 100 MG TABS   No No   Si in the morning and 2 at bedtime   Rimegepant Sulfate (Nurtec) 75 MG TBDP   No No   Sig: Take 75 mg by mouth as needed (migraine)   cyproheptadine (PERIACTIN) 4 mg tablet   No No   Sig: Take 2 tablets at night   dexamethasone (DECADRON) 2 mg tablet   No No   Sig: Take 1 tablet (2 mg total) by mouth daily with breakfast   Patient not taking: Reported on 2021   divalproex sodium (DEPAKOTE) 250 mg EC tablet   No No   Sig: Take 2 tablets at night for three nights  Then take 1 tablet at night for two nights and stop     Patient not taking: Reported on 2021   docusate sodium (COLACE) 100 mg capsule   No No   Sig: Take 1 capsule (100 mg total) by mouth 2 (two) times a day   lactulose 20 g/30 mL   No No   Sig: Take 15 mL (10 g total) by mouth 2 (two) times a day   loratadine (CLARITIN) 10 mg tablet   No No   Sig: Take 1 tablet (10 mg total) by mouth daily   magnesium 30 MG tablet   Yes No   Sig: Take 400 mg by mouth 2 (two) times a day   magnesium oxide (MAG-OX) 400 mg   No No   Si tab twice a day   polyethylene glycol (GLYCOLAX) 17 GM/SCOOP powder   No No   Sig: Take 17 g by mouth daily   Patient not taking: Reported on 2021   prochlorperazine (COMPAZINE) 10 mg tablet   No No   Si tab at onset of migraine, can repeat in 8 hours, can take with triptan/NSAID   Patient not taking: Reported on 5/13/2021   pseudoephedrine (SUDAFED) 30 mg tablet   No No   Sig: Take 1 tablet (30 mg total) by mouth every 8 (eight) hours as needed for congestion   Patient not taking: Reported on 5/13/2021   psyllium (METAMUCIL SMOOTH TEXTURE) 28 % packet   No No   Sig: Take 1 packet by mouth 2 (two) times a day   rizatriptan (MAXALT) 5 MG tablet   No No   Sig: Take 1 tablet (5 mg total) by mouth once as needed for migraine for up to 1 dose May repeat in 2 hours if needed   Patient not taking: Reported on 5/13/2021      Facility-Administered Medications: None       Past Medical History:   Diagnosis Date    Frequent headaches        Past Surgical History:   Procedure Laterality Date    NO PAST SURGERIES         Family History   Problem Relation Age of Onset    No Known Problems Mother     No Known Problems Father      I have reviewed and agree with the history as documented  E-Cigarette/Vaping    E-Cigarette Use Never User      E-Cigarette/Vaping Substances    Nicotine No     THC No     CBD No     Flavoring No     Other No     Unknown No      Social History     Tobacco Use    Smoking status: Former Smoker     Packs/day: 0 00     Types: Cigarettes    Smokeless tobacco: Never Used    Tobacco comment: 3 cigarettes a day    Vaping Use    Vaping Use: Never used   Substance Use Topics    Alcohol use: Not Currently    Drug use: Not Currently       Review of Systems   Constitutional: Negative for chills and fever  HENT: Negative for rhinorrhea, sore throat and trouble swallowing  Eyes: Negative for pain  Respiratory: Negative for cough, shortness of breath, wheezing and stridor  Cardiovascular: Negative for chest pain and leg swelling  Gastrointestinal: Positive for abdominal pain, blood in stool, diarrhea and hematochezia  Negative for nausea  Endocrine: Negative for polyuria     Genitourinary: Negative for dysuria, flank pain and urgency  Musculoskeletal: Negative for joint swelling, myalgias and neck stiffness  Skin: Negative for rash  Allergic/Immunologic: Negative for immunocompromised state  Neurological: Negative for dizziness, syncope, weakness, numbness and headaches  Psychiatric/Behavioral: Negative for confusion and suicidal ideas  All other systems reviewed and are negative  Physical Exam  Physical Exam  Vitals and nursing note reviewed  Constitutional:       Appearance: Normal appearance  She is well-developed  HENT:      Head: Normocephalic and atraumatic  Nose: Nose normal       Mouth/Throat:      Mouth: Mucous membranes are moist    Eyes:      Extraocular Movements: Extraocular movements intact  Pupils: Pupils are equal, round, and reactive to light  Cardiovascular:      Rate and Rhythm: Normal rate and regular rhythm  Heart sounds: No murmur heard  No friction rub  Pulmonary:      Effort: No respiratory distress  Breath sounds: Normal breath sounds  No wheezing or rales  Abdominal:      General: Bowel sounds are normal  There is no distension  Palpations: Abdomen is soft  Tenderness: There is generalized abdominal tenderness  Musculoskeletal:         General: No tenderness  Normal range of motion  Cervical back: Normal range of motion and neck supple  Skin:     General: Skin is warm  Findings: No rash  Neurological:      Mental Status: She is alert and oriented to person, place, and time     Psychiatric:         Mood and Affect: Mood normal          Vital Signs  ED Triage Vitals [06/29/21 2114]   Temperature Pulse Respirations Blood Pressure SpO2   98 7 °F (37 1 °C) 92 20 111/72 99 %      Temp Source Heart Rate Source Patient Position - Orthostatic VS BP Location FiO2 (%)   Tympanic Monitor Sitting -- --      Pain Score       8           Vitals:    06/29/21 2114   BP: 111/72   Pulse: 92   Patient Position - Orthostatic VS: Sitting         Visual Acuity      ED Medications  Medications   sodium chloride 0 9 % bolus 1,000 mL (0 mL Intravenous Stopped 6/29/21 2335)   pantoprazole (PROTONIX) 80 mg in sodium chloride 0 9 % 100 mL IVPB (0 mg Intravenous Stopped 6/29/21 2335)   iohexol (OMNIPAQUE) 350 MG/ML injection (SINGLE-DOSE) 100 mL (100 mL Intravenous Given 6/29/21 2232)   metroNIDAZOLE (FLAGYL) tablet 500 mg (500 mg Oral Given 6/29/21 2333)       Diagnostic Studies  Results Reviewed     Procedure Component Value Units Date/Time    POCT pregnancy, urine [[id number]]  (Normal) Resulted: 06/29/21 2225    Lab Status: Final result Updated: 06/29/21 2225     EXT PREG TEST UR (Ref: Negative) negative     Control valid    Comprehensive metabolic panel [[id number]]  (Abnormal) Collected: 06/29/21 2201    Lab Status: Final result Specimen: Blood from Arm, Right Updated: 06/29/21 2225     Sodium 140 mmol/L      Potassium 3 5 mmol/L      Chloride 106 mmol/L      CO2 26 mmol/L      ANION GAP 8 mmol/L      BUN 9 mg/dL      Creatinine 0 79 mg/dL      Glucose 107 mg/dL      Calcium 9 5 mg/dL      AST 37 U/L      ALT 18 U/L      Alkaline Phosphatase 60 U/L      Total Protein 6 7 g/dL      Albumin 3 8 g/dL      Total Bilirubin 0 21 mg/dL      eGFR 101 ml/min/1 73sq m     Narrative:      [place] guidelines for Chronic Kidney Disease (CKD):     Stage 1 with normal or high GFR (GFR > 90 mL/min/1 73 square meters)    Stage 2 Mild CKD (GFR = 60-89 mL/min/1 73 square meters)    Stage 3A Moderate CKD (GFR = 45-59 mL/min/1 73 square meters)    Stage 3B Moderate CKD (GFR = 30-44 mL/min/1 73 square meters)    Stage 4 Severe CKD (GFR = 15-29 mL/min/1 73 square meters)    Stage 5 End Stage CKD (GFR <15 mL/min/1 73 square meters)  Note: GFR calculation is accurate only with a steady state creatinine    Protime-INR [[id number]]  (Normal) Collected: 06/29/21 2201    Lab Status: Final result Specimen: Blood from Arm, Right Updated: 06/29/21 2221     Protime 12 8 seconds      INR 0 95    APTT [[id number]]  (Normal) Collected: 06/29/21 2201    Lab Status: Final result Specimen: Blood from Arm, Right Updated: 06/29/21 2221     PTT 29 seconds     CBC and differential [[id number]]  (Abnormal) Collected: 06/29/21 2201    Lab Status: Final result Specimen: Blood from Arm, Right Updated: 06/29/21 2215     WBC 12 10 Thousand/uL      RBC 4 61 Million/uL      Hemoglobin 13 7 g/dL      Hematocrit 40 1 %      MCV 87 fL      MCH 29 7 pg      MCHC 34 2 g/dL      RDW 13 6 %      MPV 9 6 fL      Platelets 117 Thousands/uL      Neutrophils Relative 73 %      Lymphocytes Relative 18 %      Monocytes Relative 8 %      Eosinophils Relative 1 %      Basophils Relative 0 %      Neutrophils Absolute 8 90 Thousands/µL      Lymphocytes Absolute 2 20 Thousands/µL      Monocytes Absolute 0 90 Thousand/µL      Eosinophils Absolute 0 10 Thousand/µL      Basophils Absolute 0 00 Thousands/µL                  CT abdomen pelvis with contrast   Final Result by Rojelio August MD (06/29 2321)      Mild wall thickening in the descending colon may be secondary to colitis versus under distention  No evidence of proctitis  Workstation performed: [id number]                    Procedures  Procedures         ED Course                                           MDM  Number of Diagnoses or Management Options  Colitis: new and requires workup  Rectal bleeding: new and requires workup  Diagnosis management comments: Background: 28 y o  female presents with chief complaint of lower abdominal pain  Differential: appendicitis, diverticulitis, mesenteric adenitis, ovarian cyst, cystitis, pyelonephritis, ureterolithiasis, constipation, colitis, gastric ulcer, mesenteric ischemia, perforated viscous, non specific abdominal pain    Plan: cbc, cmp, lipase, urinalysis, ct scan, symptom control     Pt re-examined and evaluated after testing and treatment   Spoke with the patient and feeling improved and sxs have resolved  Will discharge home with close f/u with pcp and instructed to return to the ED if sxs worsen or continue  Pt agrees with the plan for discharge and feels comfortable to go home with proper f/u  Advised to return for worsening or additional problems  Diagnostic tests were reviewed and questions answered  Diagnosis, care plan and treatment options were discussed  The patient understand instructions and will follow up as directed  Counseling: I had a detailed discussion with the patient and/or guardian regarding: the historical points, exam findings, and any diagnostic results supporting the discharge diagnosis, lab results, radiology results, discharge instructions reviewed with patient and/or family/caregiver and understanding was verbalized  Instructions given to return to the emergency department if symptoms worsen or persist, or if there are any questions or concerns that arise at home  All labs reviewed and utilized in the medical decision making process    All radiology studies independently viewed by me and interpreted by the radiologist            Amount and/or Complexity of Data Reviewed  Clinical lab tests: ordered and reviewed  Tests in the radiology section of CPT®: ordered and reviewed  Review and summarize past medical records: yes  Independent visualization of images, tracings, or specimens: yes        Disposition  Final diagnoses:   Rectal bleeding   Colitis     Time reflects when diagnosis was documented in both MDM as applicable and the Disposition within this note     Time User Action Codes Description Comment    6/29/2021 11:26 PM Enrique Michael Add [K62 5] Rectal bleeding     6/29/2021 11:26 PM Enrique Michael Add [K52 9] Colitis       ED Disposition     ED Disposition Condition Date/Time Comment    Discharge Stable Tue Jun 29, 2021 11:26 PM [address] discharge to home/self care              Follow-up Information    None         Discharge Medication List as of 6/29/2021 11:27 PM      START taking these medications    Details   metroNIDAZOLE (FLAGYL) 500 mg tablet Take 1 tablet (500 mg total) by mouth every 8 (eight) hours for 10 days, Starting Tue 6/29/2021, Until Fri 7/9/2021, Print         CONTINUE these medications which have NOT CHANGED    Details   Cholecalciferol (Vitamin D3) 50 MCG (2000 UT) TABS Take 2,000 Units by mouth daily, Historical Med      cyproheptadine (PERIACTIN) 4 mg tablet Take 2 tablets at night, Normal      dexamethasone (DECADRON) 2 mg tablet Take 1 tablet (2 mg total) by mouth daily with breakfast, Starting Tue 4/13/2021, Normal      divalproex sodium (DEPAKOTE) 250 mg EC tablet Take 2 tablets at night for three nights   Then take 1 tablet at night for two nights and stop , Normal      docusate sodium (COLACE) 100 mg capsule Take 1 capsule (100 mg total) by mouth 2 (two) times a day, Starting Thu 5/13/2021, Normal      lactulose 20 g/30 mL Take 15 mL (10 g total) by mouth 2 (two) times a day, Starting Thu 5/13/2021, Normal      loratadine (CLARITIN) 10 mg tablet Take 1 tablet (10 mg total) by mouth daily, Starting Fri 4/16/2021, Normal      magnesium 30 MG tablet Take 400 mg by mouth 2 (two) times a day, Historical Med      magnesium oxide (MAG-OX) 400 mg 1 tab twice a day, Normal      polyethylene glycol (GLYCOLAX) 17 GM/SCOOP powder Take 17 g by mouth daily, Starting Thu 9/24/2020, Normal      prochlorperazine (COMPAZINE) 10 mg tablet 1 tab at onset of migraine, can repeat in 8 hours, can take with triptan/NSAID, Normal      pseudoephedrine (SUDAFED) 30 mg tablet Take 1 tablet (30 mg total) by mouth every 8 (eight) hours as needed for congestion, Starting Fri 4/16/2021, Normal      psyllium (METAMUCIL SMOOTH TEXTURE) 28 % packet Take 1 packet by mouth 2 (two) times a day, Starting Thu 5/13/2021, Normal      Riboflavin (Vitamin B-2) 100 MG TABS 2 in the morning and 2 at bedtime, Normal      Rimegepant Sulfate (Nurtec) 75 MG TBDP Take 75 mg by mouth as needed (migraine), Starting Thu 6/10/2021, Normal      rizatriptan (MAXALT) 5 MG tablet Take 1 tablet (5 mg total) by mouth once as needed for migraine for up to 1 dose May repeat in 2 hours if needed, Starting Tue 4/27/2021, Normal           No discharge procedures on file      PDMP Review       Value Time User    PDMP Reviewed  Yes 10/1/2020 12:38 AM Jovana Salazar DO          ED Provider  Electronically Signed by           Randolph Hancock DO  07/01/21 2751

## 2021-06-30 NOTE — ED PROCEDURE NOTE
PROCEDURE  ECG 12 Lead Documentation Only    Date/Time: 6/29/2021 9:59 PM  Performed by: Nereida Pelaez DO  Authorized by: Nereida Pelaez DO     ECG reviewed by me, the ED Provider: yes    Patient location:  ED  Previous ECG:     Previous ECG:  Compared to current    Similarity:  No change  Interpretation:     Interpretation: normal    Rate:     ECG rate assessment: normal    Rhythm:     Rhythm: sinus rhythm    Ectopy:     Ectopy: none    QRS:     QRS axis:  Normal    QRS intervals:  Normal  Conduction:     Conduction: normal    ST segments:     ST segments:  Normal  T waves:     T waves: normal           Nereida Pelaez DO  06/29/21 2159

## 2021-07-02 ENCOUNTER — HOSPITAL ENCOUNTER (EMERGENCY)
Facility: HOSPITAL | Age: 31
Discharge: HOME | End: 2021-07-02
Attending: EMERGENCY MEDICINE | Admitting: EMERGENCY MEDICINE
Payer: COMMERCIAL

## 2021-07-02 VITALS
RESPIRATION RATE: 20 BRPM | BODY MASS INDEX: 29.77 KG/M2 | DIASTOLIC BLOOD PRESSURE: 73 MMHG | TEMPERATURE: 98.1 F | OXYGEN SATURATION: 96 % | WEIGHT: 142.42 LBS | SYSTOLIC BLOOD PRESSURE: 128 MMHG | HEART RATE: 97 BPM

## 2021-07-02 DIAGNOSIS — S90.819A FOOT ABRASION: Primary | ICD-10-CM

## 2021-07-02 PROCEDURE — 90471 IMMUNIZATION ADMIN: CPT

## 2021-07-02 PROCEDURE — 90715 TDAP VACCINE 7 YRS/> IM: CPT | Performed by: EMERGENCY MEDICINE

## 2021-07-02 PROCEDURE — 99284 EMERGENCY DEPT VISIT MOD MDM: CPT | Performed by: EMERGENCY MEDICINE

## 2021-07-02 PROCEDURE — 99283 EMERGENCY DEPT VISIT LOW MDM: CPT

## 2021-07-02 RX ORDER — CEPHALEXIN 250 MG/1
500 CAPSULE ORAL 4 TIMES DAILY
Qty: 56 CAPSULE | Refills: 0 | Status: SHIPPED | OUTPATIENT
Start: 2021-07-02 | End: 2021-07-09

## 2021-07-02 RX ORDER — CEPHALEXIN 500 MG/1
500 CAPSULE ORAL ONCE
Status: COMPLETED | OUTPATIENT
Start: 2021-07-02 | End: 2021-07-02

## 2021-07-02 RX ORDER — LIDOCAINE HYDROCHLORIDE AND EPINEPHRINE 10; 10 MG/ML; UG/ML
10 INJECTION, SOLUTION INFILTRATION; PERINEURAL ONCE
Status: COMPLETED | OUTPATIENT
Start: 2021-07-02 | End: 2021-07-02

## 2021-07-02 RX ADMIN — TETANUS TOXOID, REDUCED DIPHTHERIA TOXOID AND ACELLULAR PERTUSSIS VACCINE, ADSORBED 0.5 ML: 5; 2.5; 8; 8; 2.5 SUSPENSION INTRAMUSCULAR at 18:01

## 2021-07-02 RX ADMIN — CEPHALEXIN 500 MG: 500 CAPSULE ORAL at 18:00

## 2021-07-02 RX ADMIN — LIDOCAINE HYDROCHLORIDE,EPINEPHRINE BITARTRATE 10 ML: 10; .01 INJECTION, SOLUTION INFILTRATION; PERINEURAL at 17:53

## 2021-07-03 NOTE — ED PROVIDER NOTES
History  Chief Complaint   Patient presents with    Foot Injury     Pt stepped on piece of metal in the bottom of her right foot  Pt placed triple antibiotic ointment on wound  HPI     Patient is a pleasant 28 yof with a small abrasion to the base of the right foot  Mild erythema  This appears to be either early cellulitis or simply a local reaction  No other complaints  No f/c/s  No other wounds  No UTD on tetanus  MDM pleasant 28 yof, will update tetanus, no indication for any wound closure, wound is quite small  Will dc, treat with abx  Prior to Admission Medications   Prescriptions Last Dose Informant Patient Reported? Taking? Cholecalciferol (Vitamin D3) 50 MCG (2000) TABS  Self Yes No   Sig: Take 2,000 Units by mouth daily   Riboflavin (Vitamin B-2) 100 MG TABS   No No   Si in the morning and 2 at bedtime   Rimegepant Sulfate (Nurtec) 75 MG TBDP   No No   Sig: Take 75 mg by mouth as needed (migraine)   cyproheptadine (PERIACTIN) 4 mg tablet   No No   Sig: Take 2 tablets at night   dexamethasone (DECADRON) 2 mg tablet   No No   Sig: Take 1 tablet (2 mg total) by mouth daily with breakfast   Patient not taking: Reported on 2021   divalproex sodium (DEPAKOTE) 250 mg EC tablet   No No   Sig: Take 2 tablets at night for three nights  Then take 1 tablet at night for two nights and stop     Patient not taking: Reported on 2021   docusate sodium (COLACE) 100 mg capsule   No No   Sig: Take 1 capsule (100 mg total) by mouth 2 (two) times a day   lactulose 20 g/30 mL   No No   Sig: Take 15 mL (10 g total) by mouth 2 (two) times a day   loratadine (CLARITIN) 10 mg tablet   No No   Sig: Take 1 tablet (10 mg total) by mouth daily   magnesium 30 MG tablet   Yes No   Sig: Take 400 mg by mouth 2 (two) times a day   magnesium oxide (MAG-OX) 400 mg   No No   Si tab twice a day   metroNIDAZOLE (FLAGYL) 500 mg tablet   No No   Sig: Take 1 tablet (500 mg total) by mouth every 8 (eight) hours for 10 days   polyethylene glycol (GLYCOLAX) 17 GM/SCOOP powder   No No   Sig: Take 17 g by mouth daily   Patient not taking: Reported on 2021   prochlorperazine (COMPAZINE) 10 mg tablet   No No   Si tab at onset of migraine, can repeat in 8 hours, can take with triptan/NSAID   Patient not taking: Reported on 2021   pseudoephedrine (SUDAFED) 30 mg tablet   No No   Sig: Take 1 tablet (30 mg total) by mouth every 8 (eight) hours as needed for congestion   Patient not taking: Reported on 2021   psyllium (METAMUCIL SMOOTH TEXTURE) 28 % packet   No No   Sig: Take 1 packet by mouth 2 (two) times a day   rizatriptan (MAXALT) 5 MG tablet   No No   Sig: Take 1 tablet (5 mg total) by mouth once as needed for migraine for up to 1 dose May repeat in 2 hours if needed   Patient not taking: Reported on 2021      Facility-Administered Medications: None       Past Medical History:   Diagnosis Date    Frequent headaches        Past Surgical History:   Procedure Laterality Date    NO PAST SURGERIES         Family History   Problem Relation Age of Onset    No Known Problems Mother     No Known Problems Father      I have reviewed and agree with the history as documented  E-Cigarette/Vaping    E-Cigarette Use Never User      E-Cigarette/Vaping Substances    Nicotine No     THC No     CBD No     Flavoring No     Other No     Unknown No      Social History     Tobacco Use    Smoking status: Former Smoker     Packs/day: 0 00     Types: Cigarettes    Smokeless tobacco: Never Used    Tobacco comment: 3 cigarettes a day    Vaping Use    Vaping Use: Never used   Substance Use Topics    Alcohol use: Not Currently    Drug use: Not Currently       Review of Systems   Skin: Positive for wound  All other systems reviewed and are negative  Physical Exam  Physical Exam  Vitals and nursing note reviewed  Constitutional:       Appearance: She is well-developed     HENT:      Head: Normocephalic and atraumatic  Right Ear: External ear normal       Left Ear: External ear normal    Eyes:      Conjunctiva/sclera: Conjunctivae normal    Neck:      Vascular: No JVD  Trachea: No tracheal deviation  Cardiovascular:      Rate and Rhythm: Normal rate and regular rhythm  Heart sounds: Normal heart sounds  Pulmonary:      Effort: Pulmonary effort is normal  No respiratory distress  Breath sounds: No wheezing or rales  Abdominal:      Palpations: Abdomen is soft  Tenderness: There is no abdominal tenderness  There is no guarding or rebound  Musculoskeletal:         General: No tenderness  Cervical back: Normal range of motion and neck supple  Skin:     General: Skin is warm and dry  Findings: No erythema or rash  Comments: Small abrasion to right side of foot   Neurological:      General: No focal deficit present  Mental Status: She is alert and oriented to person, place, and time  Motor: No weakness  Psychiatric:         Behavior: Behavior normal          Thought Content:  Thought content normal          Vital Signs  ED Triage Vitals [07/02/21 1752]   Temperature Pulse Respirations Blood Pressure SpO2   98 1 °F (36 7 °C) 97 20 128/73 96 %      Temp Source Heart Rate Source Patient Position - Orthostatic VS BP Location FiO2 (%)   Tympanic Monitor Sitting Right arm --      Pain Score       7           Vitals:    07/02/21 1752   BP: 128/73   Pulse: 97   Patient Position - Orthostatic VS: Sitting         Visual Acuity      ED Medications  Medications   lidocaine-epinephrine (XYLOCAINE/EPINEPHRINE) 1 %-1:100,000 injection 10 mL (10 mL Infiltration Given by Other 7/2/21 1753)   tetanus-diphtheria-acellular pertussis (BOOSTRIX) IM injection 0 5 mL (0 5 mL Intramuscular Given 7/2/21 1801)   cephalexin (KEFLEX) capsule 500 mg (500 mg Oral Given 7/2/21 1800)       Diagnostic Studies  Results Reviewed     None                 No orders to display Procedures  Procedures         ED Course                                           MDM    Disposition  Final diagnoses: Foot abrasion     Time reflects when diagnosis was documented in both MDM as applicable and the Disposition within this note     Time User Action Codes Description Comment    7/2/2021  5:55 PM Emily Crowley, [address] abrasion       ED Disposition     ED Disposition Condition Date/Time Comment    Discharge Stable Fri Jul 2, 2021  5:55 PM [address] discharge to home/self care  Follow-up Information     Follow up With Specialties Details Why Contact Info    Hellen Weems, [address] [place], Nurse Practitioner In 1 day For wound re-check [address]  [address] [facility]  [address]             Discharge Medication List as of 7/2/2021  5:57 PM      START taking these medications    Details   cephalexin (KEFLEX) 250 mg capsule Take 2 capsules (500 mg total) by mouth 4 (four) times a day for 7 days, Starting Fri 7/2/2021, Until Fri 7/9/2021, Print         CONTINUE these medications which have NOT CHANGED    Details   Cholecalciferol (Vitamin D3) 50 MCG (2000 UT) TABS Take 2,000 Units by mouth daily, Historical Med      cyproheptadine (PERIACTIN) 4 mg tablet Take 2 tablets at night, Normal      dexamethasone (DECADRON) 2 mg tablet Take 1 tablet (2 mg total) by mouth daily with breakfast, Starting Tue 4/13/2021, Normal      divalproex sodium (DEPAKOTE) 250 mg EC tablet Take 2 tablets at night for three nights   Then take 1 tablet at night for two nights and stop , Normal      docusate sodium (COLACE) 100 mg capsule Take 1 capsule (100 mg total) by mouth 2 (two) times a day, Starting Thu 5/13/2021, Normal      lactulose 20 g/30 mL Take 15 mL (10 g total) by mouth 2 (two) times a day, Starting Thu 5/13/2021, Normal      loratadine (CLARITIN) 10 mg tablet Take 1 tablet (10 mg total) by mouth daily, Starting Fri 4/16/2021, Normal      magnesium 30 MG tablet Take 400 mg by mouth 2 (two) times a day, Historical Med      magnesium oxide (MAG-OX) 400 mg 1 tab twice a day, Normal      metroNIDAZOLE (FLAGYL) 500 mg tablet Take 1 tablet (500 mg total) by mouth every 8 (eight) hours for 10 days, Starting Tue 6/29/2021, Until Fri 7/9/2021, Print      polyethylene glycol (GLYCOLAX) 17 GM/SCOOP powder Take 17 g by mouth daily, Starting Thu 9/24/2020, Normal      prochlorperazine (COMPAZINE) 10 mg tablet 1 tab at onset of migraine, can repeat in 8 hours, can take with triptan/NSAID, Normal      pseudoephedrine (SUDAFED) 30 mg tablet Take 1 tablet (30 mg total) by mouth every 8 (eight) hours as needed for congestion, Starting Fri 4/16/2021, Normal      psyllium (METAMUCIL SMOOTH TEXTURE) 28 % packet Take 1 packet by mouth 2 (two) times a day, Starting Thu 5/13/2021, Normal      Riboflavin (Vitamin B-2) 100 MG TABS 2 in the morning and 2 at bedtime, Normal      Rimegepant Sulfate (Nurtec) 75 MG TBDP Take 75 mg by mouth as needed (migraine), Starting Thu 6/10/2021, Normal      rizatriptan (MAXALT) 5 MG tablet Take 1 tablet (5 mg total) by mouth once as needed for migraine for up to 1 dose May repeat in 2 hours if needed, Starting Tue 4/27/2021, Normal           No discharge procedures on file      PDMP Review       Value Time User    PDMP Reviewed  Yes 10/1/2020 12:38 AM Marlene Hoffman DO          ED Provider  Electronically Signed by           Deepa Porter MD  07/03/21 2172

## 2021-07-13 ENCOUNTER — HOSPITAL ENCOUNTER (EMERGENCY)
Facility: HOSPITAL | Age: 31
Discharge: HOME | End: 2021-07-13
Attending: EMERGENCY MEDICINE | Admitting: EMERGENCY MEDICINE
Payer: COMMERCIAL

## 2021-07-13 VITALS
WEIGHT: 138.89 LBS | HEART RATE: 86 BPM | OXYGEN SATURATION: 99 % | RESPIRATION RATE: 16 BRPM | SYSTOLIC BLOOD PRESSURE: 124 MMHG | BODY MASS INDEX: 29.03 KG/M2 | TEMPERATURE: 98 F | DIASTOLIC BLOOD PRESSURE: 66 MMHG

## 2021-07-13 DIAGNOSIS — L73.9 FOLLICULITIS: Primary | ICD-10-CM

## 2021-07-13 PROCEDURE — 99282 EMERGENCY DEPT VISIT SF MDM: CPT

## 2021-07-13 PROCEDURE — 99282 EMERGENCY DEPT VISIT SF MDM: CPT | Performed by: EMERGENCY MEDICINE

## 2021-07-13 NOTE — DISCHARGE INSTRUCTIONS
Avoid shaving until the area heals  Apply warm compressions to the area 3-4 times per day  Apply barrier ointment (such as A&D, vasoline or aquaphor) to the area after every time you use the bathroom  Take benadryl as needed for itching

## 2021-07-13 NOTE — ED PROVIDER NOTES
History  Chief Complaint   Patient presents with    Rash     Pt c/o rash on groin that began 4 days ago after shaving that area     A 28-year-old female with no significant past medical history; presents with a rash over her vagina  Patient states the rash began 4 days ago after shaving  Rash is itchy and feels swollen  Patient feels that the rash is spreading  Patient has applied Monistat & hydrocortisone cream without relief  Patient otherwise denies fever, chills, chest pain, shortness of breath, abdominal pain, nausea, vomiting, diarrhea, vaginal bleeding, vaginal discharge & peripheral edema  A/P:  Folliculitis, located over the left labia majora  No underlying abscess appreciated  Recommend warm compresses and barrier ointment for symptomatic relief  History provided by:  Patient  Rash      Prior to Admission Medications   Prescriptions Last Dose Informant Patient Reported? Taking? Cholecalciferol (Vitamin D3) 50 MCG ( UT) TABS  Self Yes No   Sig: Take 2,000 Units by mouth daily   Riboflavin (Vitamin B-2) 100 MG TABS   No No   Si in the morning and 2 at bedtime   Rimegepant Sulfate (Nurtec) 75 MG TBDP   No No   Sig: Take 75 mg by mouth as needed (migraine)   cyproheptadine (PERIACTIN) 4 mg tablet   No No   Sig: Take 2 tablets at night   dexamethasone (DECADRON) 2 mg tablet   No No   Sig: Take 1 tablet (2 mg total) by mouth daily with breakfast   Patient not taking: Reported on 2021   divalproex sodium (DEPAKOTE) 250 mg EC tablet   No No   Sig: Take 2 tablets at night for three nights  Then take 1 tablet at night for two nights and stop     Patient not taking: Reported on 2021   docusate sodium (COLACE) 100 mg capsule   No No   Sig: Take 1 capsule (100 mg total) by mouth 2 (two) times a day   lactulose 20 g/30 mL   No No   Sig: Take 15 mL (10 g total) by mouth 2 (two) times a day   loratadine (CLARITIN) 10 mg tablet   No No   Sig: Take 1 tablet (10 mg total) by mouth daily magnesium 30 MG tablet   Yes No   Sig: Take 400 mg by mouth 2 (two) times a day   magnesium oxide (MAG-OX) 400 mg   No No   Si tab twice a day   polyethylene glycol (GLYCOLAX) 17 GM/SCOOP powder   No No   Sig: Take 17 g by mouth daily   Patient not taking: Reported on 2021   prochlorperazine (COMPAZINE) 10 mg tablet   No No   Si tab at onset of migraine, can repeat in 8 hours, can take with triptan/NSAID   Patient not taking: Reported on 2021   pseudoephedrine (SUDAFED) 30 mg tablet   No No   Sig: Take 1 tablet (30 mg total) by mouth every 8 (eight) hours as needed for congestion   Patient not taking: Reported on 2021   psyllium (METAMUCIL SMOOTH TEXTURE) 28 % packet   No No   Sig: Take 1 packet by mouth 2 (two) times a day   rizatriptan (MAXALT) 5 MG tablet   No No   Sig: Take 1 tablet (5 mg total) by mouth once as needed for migraine for up to 1 dose May repeat in 2 hours if needed   Patient not taking: Reported on 2021      Facility-Administered Medications: None       Past Medical History:   Diagnosis Date    Frequent headaches        Past Surgical History:   Procedure Laterality Date    NO PAST SURGERIES         Family History   Problem Relation Age of Onset    No Known Problems Mother     No Known Problems Father      I have reviewed and agree with the history as documented  E-Cigarette/Vaping    E-Cigarette Use Never User      E-Cigarette/Vaping Substances    Nicotine No     THC No     CBD No     Flavoring No     Other No     Unknown No      Social History     Tobacco Use    Smoking status: Current Some Day Smoker     Packs/day: 0 00     Types: Cigarettes    Smokeless tobacco: Never Used    Tobacco comment: 3 cigarettes a day    Vaping Use    Vaping Use: Never used   Substance Use Topics    Alcohol use: Not Currently    Drug use: Not Currently       Review of Systems   Skin: Positive for rash  All other systems reviewed and are negative        Physical Exam  Physical Exam  General Appearance: alert and oriented, nad, non toxic appearing  Skin:  Warm, dry, intact  HEENT: atraumatic, normocephalic  Neck: Supple, trachea midline  Cardiac: RRR; no murmurs, rub, gallops  Pulmonary: lungs CTAB; no wheezes, rales, rhonchi  Gastrointestinal: abdomen soft, nontender, nondistended; no guarding or rebound tenderness; good bowel sounds, no mass or bruits  Genitourinary: Exam chaperoned by Sarah Campbell, PCA  External pelvic exam completed, faint erythema noted over left labia majora  No pustules or vesicles  No swelling, fluctuance or induration  Extremities:  no pedal edema, 2+ pulses; no calf tenderness, no clubbing, no cyanosis  Neuro:  no focal motor or sensory deficits, CN 2-12 grossly intact  Psych:  Normal mood and affect, normal judgement and insight      Vital Signs  ED Triage Vitals [07/13/21 1803]   Temperature Pulse Respirations Blood Pressure SpO2   98 °F (36 7 °C) 86 16 124/66 99 %      Temp Source Heart Rate Source Patient Position - Orthostatic VS BP Location FiO2 (%)   Oral Monitor -- -- --      Pain Score       No Pain           Vitals:    07/13/21 1803   BP: 124/66   Pulse: 86         Visual Acuity      ED Medications  Medications - No data to display    Diagnostic Studies  Results Reviewed     None                 No orders to display              Procedures  Procedures         ED Course                                           MDM    Disposition  Final diagnoses: Folliculitis     Time reflects when diagnosis was documented in both MDM as applicable and the Disposition within this note     Time User Action Codes Description Comment    7/13/2021  6:18 PM Devon Gave Add [C79 5] Folliculitis       ED Disposition     ED Disposition Condition Date/Time Comment    Discharge Stable Tue Jul 13, 2021  6:18 PM y [facility] discharge to home/self care              Follow-up Information     Follow up With Specialties Details Why Contact Info Additional [address] [facility] Schedule an appointment as soon as possible for a visit in 3 days For re-evaluation [address], [address] [facility] [address]  James Huerta, [address], [address] [place], [place], [address] [phone]          Discharge Medication List as of 7/13/2021  6:20 PM      CONTINUE these medications which have NOT CHANGED    Details   Cholecalciferol (Vitamin D3) 50 MCG (2000 UT) TABS Take 2,000 Units by mouth daily, Historical Med      cyproheptadine (PERIACTIN) 4 mg tablet Take 2 tablets at night, Normal      dexamethasone (DECADRON) 2 mg tablet Take 1 tablet (2 mg total) by mouth daily with breakfast, Starting Tue 4/13/2021, Normal      divalproex sodium (DEPAKOTE) 250 mg EC tablet Take 2 tablets at night for three nights   Then take 1 tablet at night for two nights and stop , Normal      docusate sodium (COLACE) 100 mg capsule Take 1 capsule (100 mg total) by mouth 2 (two) times a day, Starting Thu 5/13/2021, Normal      lactulose 20 g/30 mL Take 15 mL (10 g total) by mouth 2 (two) times a day, Starting Thu 5/13/2021, Normal      loratadine (CLARITIN) 10 mg tablet Take 1 tablet (10 mg total) by mouth daily, Starting Fri 4/16/2021, Normal      magnesium 30 MG tablet Take 400 mg by mouth 2 (two) times a day, Historical Med      magnesium oxide (MAG-OX) 400 mg 1 tab twice a day, Normal      polyethylene glycol (GLYCOLAX) 17 GM/SCOOP powder Take 17 g by mouth daily, Starting Thu 9/24/2020, Normal      prochlorperazine (COMPAZINE) 10 mg tablet 1 tab at onset of migraine, can repeat in 8 hours, can take with triptan/NSAID, Normal      pseudoephedrine (SUDAFED) 30 mg tablet Take 1 tablet (30 mg total) by mouth every 8 (eight) hours as needed for congestion, Starting Fri 4/16/2021, Normal      psyllium (METAMUCIL SMOOTH TEXTURE) 28 % packet Take 1 packet by mouth 2 (two) times a day, Starting Thu 5/13/2021, Normal      Riboflavin (Vitamin B-2) 100 MG TABS 2 in the morning and 2 at bedtime, Normal      Rimegepant Sulfate (Nurtec) 75 MG TBDP Take 75 mg by mouth as needed (migraine), Starting Thu 6/10/2021, Normal      rizatriptan (MAXALT) 5 MG tablet Take 1 tablet (5 mg total) by mouth once as needed for migraine for up to 1 dose May repeat in 2 hours if needed, Starting Tue 4/27/2021, Normal           No discharge procedures on file      PDMP Review       Value Time User    PDMP Reviewed  Yes 10/1/2020 12:38 AM Macey Tellez DO          ED Provider  Electronically Signed by           Luis Fernando Trevizo DO  07/13/21 2051

## 2021-08-09 ENCOUNTER — APPOINTMENT (EMERGENCY)
Dept: RADIOLOGY | Facility: HOSPITAL | Age: 31
End: 2021-08-09
Payer: COMMERCIAL

## 2021-08-09 ENCOUNTER — HOSPITAL ENCOUNTER (EMERGENCY)
Facility: HOSPITAL | Age: 31
Discharge: HOME | End: 2021-08-09
Attending: EMERGENCY MEDICINE | Admitting: EMERGENCY MEDICINE
Payer: COMMERCIAL

## 2021-08-09 VITALS
RESPIRATION RATE: 16 BRPM | OXYGEN SATURATION: 99 % | BODY MASS INDEX: 29.12 KG/M2 | DIASTOLIC BLOOD PRESSURE: 62 MMHG | SYSTOLIC BLOOD PRESSURE: 102 MMHG | TEMPERATURE: 97.8 F | WEIGHT: 139.33 LBS | HEART RATE: 67 BPM

## 2021-08-09 DIAGNOSIS — R07.9 CHEST PAIN, UNSPECIFIED: Primary | ICD-10-CM

## 2021-08-09 LAB
ALBUMIN SERPL BCP-MCNC: 4.4 G/DL (ref 3–5.2)
ALP SERPL-CCNC: 45 U/L (ref 43–122)
ALT SERPL W P-5'-P-CCNC: 14 U/L
ANION GAP SERPL CALCULATED.3IONS-SCNC: 10 MMOL/L (ref 5–14)
AST SERPL W P-5'-P-CCNC: 28 U/L (ref 14–36)
BASOPHILS # BLD AUTO: 0 THOUSANDS/ΜL (ref 0–0.1)
BASOPHILS NFR BLD AUTO: 1 % (ref 0–1)
BILIRUB SERPL-MCNC: 0.93 MG/DL
BUN SERPL-MCNC: 8 MG/DL (ref 5–25)
CALCIUM SERPL-MCNC: 9.6 MG/DL (ref 8.4–10.2)
CHLORIDE SERPL-SCNC: 103 MMOL/L (ref 97–108)
CO2 SERPL-SCNC: 27 MMOL/L (ref 22–30)
CREAT SERPL-MCNC: 0.65 MG/DL (ref 0.6–1.2)
EOSINOPHIL # BLD AUTO: 0.1 THOUSAND/ΜL (ref 0–0.4)
EOSINOPHIL NFR BLD AUTO: 2 % (ref 0–6)
ERYTHROCYTE [DISTWIDTH] IN BLOOD BY AUTOMATED COUNT: 13.4 %
GFR SERPL CREATININE-BSD FRML MDRD: 120 ML/MIN/1.73SQ M
GLUCOSE SERPL-MCNC: 98 MG/DL (ref 70–99)
HCT VFR BLD AUTO: 39.9 % (ref 36–46)
HGB BLD-MCNC: 13.5 G/DL (ref 12–16)
LYMPHOCYTES # BLD AUTO: 1.5 THOUSANDS/ΜL (ref 0.5–4)
LYMPHOCYTES NFR BLD AUTO: 28 % (ref 25–45)
MCH RBC QN AUTO: 29.4 PG (ref 26–34)
MCHC RBC AUTO-ENTMCNC: 33.8 G/DL (ref 31–36)
MCV RBC AUTO: 87 FL (ref 80–100)
MONOCYTES # BLD AUTO: 0.5 THOUSAND/ΜL (ref 0.2–0.9)
MONOCYTES NFR BLD AUTO: 9 % (ref 1–10)
NEUTROPHILS # BLD AUTO: 3.2 THOUSANDS/ΜL (ref 1.8–7.8)
NEUTS SEG NFR BLD AUTO: 60 % (ref 45–65)
PLATELET # BLD AUTO: 205 THOUSANDS/UL (ref 150–450)
PMV BLD AUTO: 9.9 FL (ref 8.9–12.7)
POTASSIUM SERPL-SCNC: 4 MMOL/L (ref 3.6–5)
PROT SERPL-MCNC: 7.3 G/DL (ref 5.9–8.4)
RBC # BLD AUTO: 4.58 MILLION/UL (ref 4–5.2)
SODIUM SERPL-SCNC: 140 MMOL/L (ref 137–147)
TROPONIN I SERPL-MCNC: <0.01 NG/ML (ref 0–0.03)
WBC # BLD AUTO: 5.3 THOUSAND/UL (ref 4.5–11)

## 2021-08-09 PROCEDURE — 80053 COMPREHEN METABOLIC PANEL: CPT | Performed by: PHYSICIAN ASSISTANT

## 2021-08-09 PROCEDURE — 99285 EMERGENCY DEPT VISIT HI MDM: CPT

## 2021-08-09 PROCEDURE — 96361 HYDRATE IV INFUSION ADD-ON: CPT

## 2021-08-09 PROCEDURE — 36415 COLL VENOUS BLD VENIPUNCTURE: CPT | Performed by: PHYSICIAN ASSISTANT

## 2021-08-09 PROCEDURE — 71046 X-RAY EXAM CHEST 2 VIEWS: CPT

## 2021-08-09 PROCEDURE — 96360 HYDRATION IV INFUSION INIT: CPT

## 2021-08-09 PROCEDURE — 85025 COMPLETE CBC W/AUTO DIFF WBC: CPT | Performed by: PHYSICIAN ASSISTANT

## 2021-08-09 PROCEDURE — 93005 ELECTROCARDIOGRAM TRACING: CPT

## 2021-08-09 PROCEDURE — 84484 ASSAY OF TROPONIN QUANT: CPT | Performed by: PHYSICIAN ASSISTANT

## 2021-08-09 PROCEDURE — 99285 EMERGENCY DEPT VISIT HI MDM: CPT | Performed by: PHYSICIAN ASSISTANT

## 2021-08-09 RX ADMIN — SODIUM CHLORIDE 1000 ML: 0.9 INJECTION, SOLUTION INTRAVENOUS at 12:50

## 2021-08-09 NOTE — Clinical Note
[place] was seen and treated in our emergency department on 8/9/2021  Diagnosis:     Blanche Lanes  may return to work on return date  She may return on this date: 08/10/2021         If you have any questions or concerns, please don't hesitate to call        Aurora Montes PA-C    ______________________________           _______________          _______________  Hospital Representative                              Date                                Time

## 2021-08-09 NOTE — ED PROVIDER NOTES
History  Chief Complaint   Patient presents with    Chest Pain     x 2 days with L sided CP "when i touch it it hurts"     Patient is a 81-year-old female presents today for evaluation of chest pain reports that is both left and right-sided anterior and worse with palpation patient states when she takes a very deep breath is also worse  Patient denies any sharp sensation states pain is aching moderate in severity and gradual in its onset  Patient denies any coughing, congestion, shortness of breath fevers, chills, abdominal pain, nausea, vomiting, diarrhea, COVID-19 exposures  Patient reports she has not taken any medications alleviate her symptoms and denies any alleviating factors patient reports palpation and deep breaths as exacerbating factors  Patient states the pain is been constant  Patient states she does not use any oral contraceptives, estrogen hormonal therapy  Patient denies any recent surgeries, long distance travel, lower leg swelling, previous history of blood clots in her legs or lungs patient states no hemoptysis and also reports no cancer history for herself/chemotherapy  Prior to Admission Medications   Prescriptions Last Dose Informant Patient Reported? Taking? Cholecalciferol (Vitamin D3) 50 MCG (2000) TABS  Self Yes No   Sig: Take 2,000 Units by mouth daily   Riboflavin (Vitamin B-2) 100 MG TABS   No No   Si in the morning and 2 at bedtime   Rimegepant Sulfate (Nurtec) 75 MG TBDP   No No   Sig: Take 75 mg by mouth as needed (migraine)   cyproheptadine (PERIACTIN) 4 mg tablet   No No   Sig: Take 2 tablets at night   dexamethasone (DECADRON) 2 mg tablet   No No   Sig: Take 1 tablet (2 mg total) by mouth daily with breakfast   Patient not taking: Reported on 2021   divalproex sodium (DEPAKOTE) 250 mg EC tablet   No No   Sig: Take 2 tablets at night for three nights  Then take 1 tablet at night for two nights and stop     Patient not taking: Reported on 2021 docusate sodium (COLACE) 100 mg capsule   No No   Sig: Take 1 capsule (100 mg total) by mouth 2 (two) times a day   lactulose 20 g/30 mL   No No   Sig: Take 15 mL (10 g total) by mouth 2 (two) times a day   loratadine (CLARITIN) 10 mg tablet   No No   Sig: Take 1 tablet (10 mg total) by mouth daily   magnesium 30 MG tablet   Yes No   Sig: Take 400 mg by mouth 2 (two) times a day   magnesium oxide (MAG-OX) 400 mg   No No   Si tab twice a day   polyethylene glycol (GLYCOLAX) 17 GM/SCOOP powder   No No   Sig: Take 17 g by mouth daily   Patient not taking: Reported on 2021   prochlorperazine (COMPAZINE) 10 mg tablet   No No   Si tab at onset of migraine, can repeat in 8 hours, can take with triptan/NSAID   Patient not taking: Reported on 2021   pseudoephedrine (SUDAFED) 30 mg tablet   No No   Sig: Take 1 tablet (30 mg total) by mouth every 8 (eight) hours as needed for congestion   Patient not taking: Reported on 2021   psyllium (METAMUCIL SMOOTH TEXTURE) 28 % packet   No No   Sig: Take 1 packet by mouth 2 (two) times a day   rizatriptan (MAXALT) 5 MG tablet   No No   Sig: Take 1 tablet (5 mg total) by mouth once as needed for migraine for up to 1 dose May repeat in 2 hours if needed   Patient not taking: Reported on 2021      Facility-Administered Medications: None       Past Medical History:   Diagnosis Date    Frequent headaches        Past Surgical History:   Procedure Laterality Date    NO PAST SURGERIES         Family History   Problem Relation Age of Onset    No Known Problems Mother     No Known Problems Father      I have reviewed and agree with the history as documented      E-Cigarette/Vaping    E-Cigarette Use Never User      E-Cigarette/Vaping Substances    Nicotine No     THC No     CBD No     Flavoring No     Other No     Unknown No      Social History     Tobacco Use    Smoking status: Current Some Day Smoker     Packs/day: 0 00     Types: Cigarettes    Smokeless tobacco: Never Used    Tobacco comment: 3 cigarettes a day    Vaping Use    Vaping Use: Never used   Substance Use Topics    Alcohol use: Not Currently    Drug use: Not Currently       Review of Systems   Constitutional: Negative for chills, fatigue and fever  HENT: Negative for congestion, ear pain, rhinorrhea and sore throat  Eyes: Negative for redness  Respiratory: Negative for chest tightness and shortness of breath  Cardiovascular: Positive for chest pain  Negative for palpitations  Gastrointestinal: Negative for abdominal pain, nausea and vomiting  Genitourinary: Negative for dysuria and hematuria  Musculoskeletal: Negative  Skin: Negative for rash  Neurological: Negative for dizziness, syncope, light-headedness and numbness  Physical Exam  Physical Exam  Vitals and nursing note reviewed  Constitutional:       Appearance: She is well-developed  HENT:      Head: Normocephalic  Eyes:      General: No scleral icterus  Cardiovascular:      Rate and Rhythm: Normal rate and regular rhythm  Pulmonary:      Effort: Pulmonary effort is normal       Breath sounds: Normal breath sounds  No stridor  Comments:  Patient in no respiratory distress, speaking in full sentences, managing oral secretions without difficulty, no accessory muscle use, retractions, or belly breathing noted, no adventitious lung sounds auscultated bilaterally  Chest:          Comments: Tenderness palpation noted in the anterior chest, no crepitus, subcutaneous  Abdominal:      General: There is no distension  Palpations: Abdomen is soft  Tenderness: There is no abdominal tenderness  Musculoskeletal:         General: Normal range of motion  Skin:     General: Skin is warm and dry  Capillary Refill: Capillary refill takes less than 2 seconds  Neurological:      Mental Status: She is alert and oriented to person, place, and time           Vital Signs  ED Triage Vitals [08/09/21 1218] Temperature Pulse Respirations Blood Pressure SpO2   (!) 86 9 °F (30 5 °C) 67 16 102/62 99 %      Temp Source Heart Rate Source Patient Position - Orthostatic VS BP Location FiO2 (%)   Tympanic Monitor Sitting Left arm --      Pain Score       8           Vitals:    08/09/21 1218   BP: 102/62   Pulse: 67   Patient Position - Orthostatic VS: Sitting         Visual Acuity      ED Medications  Medications   sodium chloride 0 9 % bolus 1,000 mL (1,000 mL Intravenous New Bag 8/9/21 1250)       Diagnostic Studies  Results Reviewed     Procedure Component Value Units Date/Time    Troponin I [[id number]]  (Normal) Collected: 08/09/21 1250    Lab Status: Final result Specimen: Blood from Arm, Right Updated: 08/09/21 1352     Troponin I <0 01 ng/mL     Comprehensive metabolic panel [[id number]]  (Normal) Collected: 08/09/21 1250    Lab Status: Final result Specimen: Blood from Arm, Right Updated: 08/09/21 1343     Sodium 140 mmol/L      Potassium 4 0 mmol/L      Chloride 103 mmol/L      CO2 27 mmol/L      ANION GAP 10 mmol/L      BUN 8 mg/dL      Creatinine 0 65 mg/dL      Glucose 98 mg/dL      Calcium 9 6 mg/dL      AST 28 U/L      ALT 14 U/L      Alkaline Phosphatase 45 U/L      Total Protein 7 3 g/dL      Albumin 4 4 g/dL      Total Bilirubin 0 93 mg/dL      eGFR 120 ml/min/1 73sq m     Narrative:      Paula guidelines for Chronic Kidney Disease (CKD):     Stage 1 with normal or high GFR (GFR > 90 mL/min/1 73 square meters)    Stage 2 Mild CKD (GFR = 60-89 mL/min/1 73 square meters)    Stage 3A Moderate CKD (GFR = 45-59 mL/min/1 73 square meters)    Stage 3B Moderate CKD (GFR = 30-44 mL/min/1 73 square meters)    Stage 4 Severe CKD (GFR = 15-29 mL/min/1 73 square meters)    Stage 5 End Stage CKD (GFR <15 mL/min/1 73 square meters)  Note: GFR calculation is accurate only with a steady state creatinine    CBC and differential [[id number]]  (Normal) Collected: 08/09/21 1250    Lab Status: Final result Specimen: Blood from Arm, Right Updated: 08/09/21 1335     WBC 5 30 Thousand/uL      RBC 4 58 Million/uL      Hemoglobin 13 5 g/dL      Hematocrit 39 9 %      MCV 87 fL      MCH 29 4 pg      MCHC 33 8 g/dL      RDW 13 4 %      MPV 9 9 fL      Platelets 557 Thousands/uL      Neutrophils Relative 60 %      Lymphocytes Relative 28 %      Monocytes Relative 9 %      Eosinophils Relative 2 %      Basophils Relative 1 %      Neutrophils Absolute 3 20 Thousands/µL      Lymphocytes Absolute 1 50 Thousands/µL      Monocytes Absolute 0 50 Thousand/µL      Eosinophils Absolute 0 10 Thousand/µL      Basophils Absolute 0 00 Thousands/µL                  XR chest 2 views   ED Interpretation by Maximino Yang PA-C (08/09 1506)   No acute consolidation to suggest pneumonia  Procedures  ECG 12 Lead Documentation Only    Date/Time: 8/9/2021 1:09 PM  Performed by: Maximino Yang PA-C  Authorized by: Maximino aYng PA-C     Indications / Diagnosis:  Chest pain  ECG reviewed by me, the ED Provider: yes    Patient location:  ED  Interpretation:     Interpretation: normal    Rate:     ECG rate:  62    ECG rate assessment: normal    Rhythm:     Rhythm: sinus rhythm    Ectopy:     Ectopy: none    QRS:     QRS axis:  Left    QRS intervals:  Normal  Conduction:     Conduction: normal    ST segments:     ST segments:  Normal  T waves:     T waves: inverted      Inverted:  III and V1  Comments:      qtc [id number]             ED Course  ED Course as of Aug 09 1507   Mon Aug 09, 2021   1506 Patient was reexamined at this time and informed of laboratory and/or imaging results and was found to be stable for discharge  Return to emergency department criteria was reviewed with the patient who verbalized understanding and was agreeable to discharge and the treatment plan at this time                      HEART Risk Score      Most Recent Value   Heart Score Risk Calculator   History  0 Filed at: 08/09/2021 1506 ECG  0 Filed at: 08/09/2021 1506   Age  0 Filed at: 08/09/2021 1506   Risk Factors  0 Filed at: 08/09/2021 1506   Troponin  0 Filed at: 08/09/2021 1506   HEART Score  0 Filed at: 08/09/2021 1506              PERC Rule for PE      Most Recent Value   PERC Rule for PE   Age >=50  0 Filed at: 08/09/2021 1241   HR >=100  0 Filed at: 08/09/2021 1241   O2 Sat on room air < 95%  0 Filed at: 08/09/2021 1241   History of PE or DVT  0 Filed at: 08/09/2021 1241   Recent trauma or surgery  0 Filed at: 08/09/2021 1241   Hemoptysis  0 Filed at: 08/09/2021 1241   Exogenous estrogen  0 Filed at: 08/09/2021 1241   Unilateral leg swelling  0 Filed at: 08/09/2021 1241   [address] for PE Results  0 Filed at: 08/09/2021 1241                            [facility]  Number of Diagnoses or Management Options  Diagnosis management comments: All imaging and/or lab testing discussed with patient, strict return to ED precautions discussed  Patient and/or family members verbalizes understanding and agrees with plan  Patient is stable for discharge     Portions of the record may have been created with voice recognition software  Occasional wrong word or "sound a like" substitutions may have occurred due to the inherent limitations of voice recognition software  Read the chart carefully and recognize, using context, where substitutions have occurred  Disposition  Final diagnoses:   Chest pain, unspecified     Time reflects when diagnosis was documented in both MDM as applicable and the Disposition within this note     Time User Action Codes Description Comment    8/9/2021  3:07 PM Cydney Diamond Add [R07 9] Chest pain, unspecified       ED Disposition     ED Disposition Condition Date/Time Comment    Discharge Stable Mon Aug 9, 2021  3:07 PM [address] discharge to home/self care              Follow-up Information     Follow up With Specialties Details Why Contact Info    Silvestre Hodge, [address], Nurse Practitioner Schedule an appointment as soon as possible for a visit in 2 days  [address] [place] [address] 3861            Patient's Medications   Discharge Prescriptions    No medications on file     No discharge procedures on file      PDMP Review       Value Time User    PDMP Reviewed  Yes 10/1/2020 12:38 AM Macey Tellez DO          ED Provider  Electronically Signed by           Mary Kay Zabala PA-C  08/09/21 5784

## 2021-08-10 LAB
ATRIAL RATE: 62 BPM
ATRIAL RATE: 67 BPM
P AXIS: 37 DEGREES
P AXIS: 42 DEGREES
PR INTERVAL: 120 MS
PR INTERVAL: 128 MS
QRS AXIS: 39 DEGREES
QRS AXIS: 43 DEGREES
QRSD INTERVAL: 72 MS
QRSD INTERVAL: 74 MS
QT INTERVAL: 410 MS
QT INTERVAL: 414 MS
QTC INTERVAL: 420 MS
QTC INTERVAL: 433 MS
T WAVE AXIS: 20 DEGREES
T WAVE AXIS: 20 DEGREES
VENTRICULAR RATE: 62 BPM
VENTRICULAR RATE: 67 BPM

## 2021-08-10 PROCEDURE — 93010 ELECTROCARDIOGRAM REPORT: CPT | Performed by: INTERNAL MEDICINE

## 2021-08-11 NOTE — ED ATTENDING ATTESTATION
I was the attending physician on duty at the time the patient visited the emergency department  The patient was evaluated and dispositioned by the APC  I was personally available for consultation  I am administratively signing the chart after the fact      Paul Atkinson MD

## 2021-08-17 ENCOUNTER — OFFICE VISIT (OUTPATIENT)
Dept: NEUROLOGY | Facility: CLINIC | Age: 31
End: 2021-08-17
Payer: COMMERCIAL

## 2021-08-17 VITALS
HEART RATE: 83 BPM | HEIGHT: 58 IN | WEIGHT: 138 LBS | BODY MASS INDEX: 28.97 KG/M2 | SYSTOLIC BLOOD PRESSURE: 105 MMHG | DIASTOLIC BLOOD PRESSURE: 73 MMHG

## 2021-08-17 DIAGNOSIS — F51.01 PRIMARY INSOMNIA: Primary | ICD-10-CM

## 2021-08-17 DIAGNOSIS — G44.229 CHRONIC TENSION-TYPE HEADACHE, NOT INTRACTABLE: ICD-10-CM

## 2021-08-17 DIAGNOSIS — G43.701 CHRONIC MIGRAINE WITHOUT AURA WITH STATUS MIGRAINOSUS, NOT INTRACTABLE: ICD-10-CM

## 2021-08-17 PROCEDURE — 99213 OFFICE O/P EST LOW 20 MIN: CPT | Performed by: NURSE PRACTITIONER

## 2021-08-17 RX ORDER — RIZATRIPTAN BENZOATE 5 MG/1
5 TABLET ORAL DAILY PRN
Qty: 9 TABLET | Refills: 0 | Status: SHIPPED | OUTPATIENT
Start: 2021-08-17 | End: 2022-04-28

## 2021-08-17 RX ORDER — RIMEGEPANT SULFATE 75 MG/75MG
75 TABLET, ORALLY DISINTEGRATING ORAL AS NEEDED
Qty: 8 TABLET | Refills: 3 | Status: SHIPPED | OUTPATIENT
Start: 2021-08-17 | End: 2022-04-10 | Stop reason: SDUPTHER

## 2021-08-17 RX ORDER — NORTRIPTYLINE HYDROCHLORIDE 10 MG/1
10 CAPSULE ORAL
Qty: 30 CAPSULE | Refills: 2 | Status: SHIPPED | OUTPATIENT
Start: 2021-08-17 | End: 2021-09-10 | Stop reason: SDUPTHER

## 2021-08-17 NOTE — PROGRESS NOTES
Review of Systems   Constitutional: Negative  Negative for appetite change and fever  HENT: Negative  Negative for hearing loss, tinnitus, trouble swallowing and voice change  Eyes: Positive for pain and visual disturbance  Negative for photophobia  Respiratory: Negative  Negative for shortness of breath  Cardiovascular: Negative  Negative for palpitations  Gastrointestinal: Negative  Negative for nausea and vomiting  Endocrine: Positive for heat intolerance (triggers migraines)  Negative for cold intolerance  Genitourinary: Negative  Negative for dysuria, frequency and urgency  Musculoskeletal: Negative  Negative for myalgias and neck pain  Skin: Negative  Negative for rash  Neurological: Positive for headaches (migraines occur frequently)  Negative for dizziness, tremors, seizures, syncope, facial asymmetry, speech difficulty, weakness, light-headedness and numbness  Hematological: Negative  Does not bruise/bleed easily  Psychiatric/Behavioral: Positive for sleep disturbance  Negative for confusion and hallucinations  All other systems reviewed and are negative

## 2021-08-17 NOTE — PROGRESS NOTES
Patient ID: Usman Zhu is a 28 y o  female with chronic migraine and chronic tension-type headache, who is returning to Neurology office for follow up of her headaches  Assessment/Plan:    Chronic migraine without aura with status migrainosus, not intractable  Migraines are currently occurring about 2 times per week  She has been taking B2 and magnesium as recommended at prior visit  She is not currently interested or planning on any future pregnancy at this time  She is agreeable to starting a medication for migraine prevention  She failed topiramate in the past  Discussed beta blocker of antidepressant as next step  Due to borderline low blood pressure at baseline, preferred to start antidepressant  She is agreeable to starting on nortriptyline 10 mg HS which we discussed can be increased slowly as needed  Side effects reviewed  For abortive she can take nurtec or maxalt  Prior MRI brain normal  Neurologic exam non focal  Recent labs unremarkable  Patient will call the office with any worsening of headaches or concerns  Follow up in 3-4 months or sooner if needed     Insomnia  Recommended increasing melatonin from 5 mg HS to 10 mg HS  Nortriptyline will also likely be helpful for sleep  Chronic tension-type headache, not intractable  Continue to occur frequently  Resolves with tylenol and increasing water intake  She will Return for follow up in 3-4 months with headache team       Subjective:  Usman Zhu is a 28 y o  female with chronic migraine and chronic tension-type headache, who is returning to Neurology office for follow up of her headaches  She was last seen in the office on 1/14/2021  At that time, she had continued to have frequent headaches but did not start prescribed therapy after her prior visit  She had stopped topiramate due to worsening of headaches  SHe did not start magnesium or riboflavin because they were not covered by her insurance  Discussed that these are available OTC and she was agreeable to purchasing  She was to continue with cyproheptadine  Due to possibility of getting pregnant in the near future, wanted to avoid teratogenic medications  Instead of sumatriptan she was to try rizatriptan  She was to continue with compazine + ibuprofen which could also be taken with the rizatriptan  Recommended MRI brain, vitamin D level, and lyme testing  For insomnia recommended melatonin 5 mg hS to 10 mg hS PRN  Since her last visit, there were a few times she called for headaches  The brightness the sun and the heat outside cause her to get more headaches than migraines  Migraines are occurring about 2 times per week  Regular headaches occur daily but do go away quick if she takes tylenol and drinks a lot of water  She does have photosensitivity  She wears contacts and has to sometimes change to glasses because her vision gets blurred from the migraine  She does go to get yearly eye exams  Last visit was July and there was no change to her vision  She has been having trouble sleeping again  She can fall asleep but has trouble staying asleep  The melatonin does not help currently taking 5 mg HS>     Toradol has been helpful for migraines in the past when she has had it in the ER  No current plans for pregnancy or in the near future  She has been taking magnesium and B2  Does not believe the cyproheptadine is that helpful anymore  Rizatriptan helps more than sumatriptan and does not give her any side effects  She is unsure if she tried the nurtec  Prior Workup:  - 1/28/2021 MRI brain w wo contrast: None  Results for Melly Agent (MRN [id number])    Ref   Range 8/9/2021 12:50   Sodium Latest Ref Range: 137 - 147 mmol/L 140   Potassium Latest Ref Range: 3 6 - 5 0 mmol/L 4 0   Chloride Latest Ref Range: 97 - 108 mmol/L 103   CO2 Latest Ref Range: 22 - 30 mmol/L 27   Anion Gap Latest Ref Range: 5 - 14 mmol/L 10   BUN Latest Ref Range: 5 - 25 mg/dL 8   Creatinine Latest Ref Range: 0 60 - 1 20 mg/dL 0 65   Glucose, Random Latest Ref Range: 70 - 99 mg/dL 98   Calcium Latest Ref Range: 8 4 - 10 2 mg/dL 9 6   AST Latest Ref Range: 14 - 36 U/L 28   ALT Latest Ref Range: <35 U/L 14   Alkaline Phosphatase Latest Ref Range: 43 - 122 U/L 45   Total Protein Latest Ref Range: 5 9 - 8 4 g/dL 7 3   Albumin Latest Ref Range: 3 0 - 5 2 g/dL 4 4   TOTAL BILIRUBIN Latest Ref Range: <1 30 mg/dL 0 93   eGFR Latest Ref Range: >60 ml/min/1 73sq m 120   Troponin I Latest Ref Range: 0 00 - 0 03 ng/mL <0 01   WBC Latest Ref Range: 4 50 - 11 00 Thousand/uL 5 30   Red Blood Cell Count Latest Ref Range: 4 00 - 5 20 Million/uL 4 58   Hemoglobin Latest Ref Range: 12 0 - 16 0 g/dL 13 5   HCT Latest Ref Range: 36 0 - 46 0 % 39 9   MCV Latest Ref Range: 80 - 100 fL 87   MCH Latest Ref Range: 26 0 - 34 0 pg 29 4   MCHC Latest Ref Range: 31 0 - 36 0 g/dL 33 8   RDW Latest Ref Range: <15 3 % 13 4   Platelet Count Latest Ref Range: 150 - 450 Thousands/uL 205   MPV Latest Ref Range: 8 9 - 12 7 fL 9 9   Neutrophils % Latest Ref Range: 45 - 65 % 60   Lymphocytes Relative Latest Ref Range: 25 - 45 % 28   Monocytes Relative Latest Ref Range: 1 - 10 % 9   Eosinophils Latest Ref Range: 0 - 6 % 2   Basophils Relative Latest Ref Range: 0 - 1 % 1   Absolute Neutrophils Latest Ref Range: 1 80 - 7 80 Thousands/µL 3 20   Lymphocytes Absolute Latest Ref Range: 0 50 - 4 00 Thousands/µL 1 50   Absolute Monocytes Latest Ref Range: 0 20 - 0 90 Thousand/µL 0 50   Absolute Eosinophils Latest Ref Range: 0 00 - 0 40 Thousand/µL 0 10   Basophils Absolute Latest Ref Range: 0 00 - 0 10 Thousands/µL 0 00          I reviewed prior neurology notes, MRI brain report, and recent labs, as documented in [email or web address], and summarized above          Objective:    Blood pressure 105/73, pulse 83, height 4' 10" (1 473 m), weight 62 6 kg (138 lb), last menstrual period 08/01/2021, not currently breastfeeding  Physical Exam  No apparent distress  Appears well nourished  Mood appropriate for situation     Neurologic Exam  Mental status- alert and oriented to person, place, and time  Speech appropriate for conversation  Good attention and knowledge  Cranial Nerves- PERRL, EOMS normal, facial sensation and muscles symmetric, hearing intact bilaterally to finger rubs, tongue midline, palate rise symmetrical, shoulder shrug symmetrical     Motor- No pronator drift  Appropriate strength  Moves all extremities freely  No tremor  Sensory-  Intact distally in all extremities to light touch  DTRs- 2+ and symmetric in all extremities  Gait- normal casual gait  Coordination- FNF intact  ROS:  Review of Systems   Constitutional: Negative  Negative for appetite change and fever  HENT: Negative  Negative for hearing loss, tinnitus, trouble swallowing and voice change  Eyes: Positive for pain and visual disturbance  Negative for photophobia  Respiratory: Negative  Negative for shortness of breath  Cardiovascular: Negative  Negative for palpitations  Gastrointestinal: Negative  Negative for nausea and vomiting  Endocrine: Positive for heat intolerance (triggers migraines)  Negative for cold intolerance  Genitourinary: Negative  Negative for dysuria, frequency and urgency  Musculoskeletal: Negative  Negative for myalgias and neck pain  Skin: Negative  Negative for rash  Neurological: Positive for headaches (migraines occur frequently)  Negative for dizziness, tremors, seizures, syncope, facial asymmetry, speech difficulty, weakness, light-headedness and numbness  Hematological: Negative  Does not bruise/bleed easily  Psychiatric/Behavioral: Positive for sleep disturbance  Negative for confusion and hallucinations  All other systems reviewed and are negative  ROS obtained by MA and reviewed by myself

## 2021-08-17 NOTE — PATIENT INSTRUCTIONS
1  Start taking nortriptyline 10 mg at night time  If you have any issues please call the office  Call us in about 4 weeks to let us know how you are doing and we can increase it if needed  2  Try taking nurtec at onset of migraine headache  No more than one dose in 24 hours  No more than 3 doses per week  3  You can continue with maxalt as needed as well  No more than 3 tablets per week  4  Call the office with any worsening of headaches or concerns  5  Follow up in 3-4 months with headache team    6  Increase melatonin to 10 mg at night  Make sure you drink plenty of water  Avoid highly processed foods  Be cautious of artificial sweeteners  If you are considering becoming pregnant or if you become pregnant in the future, please let the office know immediately

## 2021-08-18 ENCOUNTER — TELEPHONE (OUTPATIENT)
Dept: NEUROLOGY | Facility: CLINIC | Age: 31
End: 2021-08-18

## 2021-08-18 NOTE — TELEPHONE ENCOUNTER
jerri'd via fax from [facility] for Rizatriptan 5mg tablets  Scanned to media  Routed to clinical team 1

## 2021-08-19 ENCOUNTER — OFFICE VISIT (OUTPATIENT)
Dept: OBGYN CLINIC | Facility: CLINIC | Age: 31
End: 2021-08-19

## 2021-08-19 VITALS
DIASTOLIC BLOOD PRESSURE: 77 MMHG | WEIGHT: 138 LBS | HEART RATE: 78 BPM | SYSTOLIC BLOOD PRESSURE: 120 MMHG | BODY MASS INDEX: 28.84 KG/M2

## 2021-08-19 DIAGNOSIS — N83.299 COMPLEX OVARIAN CYST: ICD-10-CM

## 2021-08-19 PROCEDURE — 99213 OFFICE O/P EST LOW 20 MIN: CPT | Performed by: OBSTETRICS & GYNECOLOGY

## 2021-08-19 NOTE — PROGRESS NOTES
OB/GYN VISIT  Katty YepezBenanyisahara  8/19/2021  5:48 PM      Assessment/Plan:    Reynold Desir is a 28 y o  with no complaints  Vulvar rash appears to have resolved  - RTC for annual appointment  - Patient considering birth control, pamphlets provided    Subjective:     Reynold Desir is a 28 y o  who presents for follow up  Patient reports that she has had ovarian cysts in the past but ultrasound in May 2021 shows only a simple cyst  Patient reports that she was seen in the ED a few weeks ago due to rash near her vagina that has since resolved  She has no complaints right now and reports that she does not know why this appointment was made  Objective:    Vitals: Blood pressure 120/77, pulse 78, weight 62 6 kg (138 lb), last menstrual period 08/01/2021, not currently breastfeeding  Body mass index is 28 84 kg/m²  Past Medical History:   Diagnosis Date    Frequent headaches      Past Surgical History:   Procedure Laterality Date    NO PAST SURGERIES         Physical Exam  Constitutional:       Appearance: Normal appearance  HENT:      Head: Normocephalic and atraumatic  Eyes:      Extraocular Movements: Extraocular movements intact  Pupils: Pupils are equal, round, and reactive to light  Cardiovascular:      Rate and Rhythm: Normal rate  Pulmonary:      Effort: Pulmonary effort is normal    Genitourinary:     Comments: Normal appearing external genitalia  Neurological:      General: No focal deficit present  Mental Status: She is alert and oriented to person, place, and time             Erum Flynn MD  8/19/2021  5:48 PM

## 2021-08-19 NOTE — ASSESSMENT & PLAN NOTE
Recommended increasing melatonin from 5 mg HS to 10 mg HS  Nortriptyline will also likely be helpful for sleep

## 2021-08-19 NOTE — ASSESSMENT & PLAN NOTE
Migraines are currently occurring about 2 times per week  She has been taking B2 and magnesium as recommended at prior visit  She is not currently interested or planning on any future pregnancy at this time  She is agreeable to starting a medication for migraine prevention  She failed topiramate in the past  Discussed beta blocker of antidepressant as next step  Due to borderline low blood pressure at baseline, preferred to start antidepressant  She is agreeable to starting on nortriptyline 10 mg HS which we discussed can be increased slowly as needed  Side effects reviewed  For abortive she can take nurtec or maxalt  Prior MRI brain normal  Neurologic exam non focal  Recent labs unremarkable  Patient will call the office with any worsening of headaches or concerns   Follow up in 3-4 months or sooner if needed

## 2021-08-27 ENCOUNTER — OFFICE VISIT (OUTPATIENT)
Dept: GASTROENTEROLOGY | Facility: MEDICAL CENTER | Age: 31
End: 2021-08-27
Payer: COMMERCIAL

## 2021-08-27 VITALS
HEART RATE: 65 BPM | DIASTOLIC BLOOD PRESSURE: 69 MMHG | TEMPERATURE: 98.4 F | BODY MASS INDEX: 28.97 KG/M2 | SYSTOLIC BLOOD PRESSURE: 101 MMHG | WEIGHT: 138.6 LBS

## 2021-08-27 DIAGNOSIS — R93.5 ABNORMAL CT OF THE ABDOMEN: ICD-10-CM

## 2021-08-27 DIAGNOSIS — K58.1 IRRITABLE BOWEL SYNDROME WITH CONSTIPATION: Primary | ICD-10-CM

## 2021-08-27 DIAGNOSIS — R14.0 BLOATING: ICD-10-CM

## 2021-08-27 PROCEDURE — 99204 OFFICE O/P NEW MOD 45 MIN: CPT | Performed by: INTERNAL MEDICINE

## 2021-08-27 RX ORDER — LINACLOTIDE 290 UG/1
290 CAPSULE, GELATIN COATED ORAL
Qty: 30 CAPSULE | Refills: 3 | Status: SHIPPED | OUTPATIENT
Start: 2021-08-27 | End: 2022-01-03 | Stop reason: SDUPTHER

## 2021-08-27 NOTE — PATIENT INSTRUCTIONS
Constipation   AMBULATORY CARE:   Constipation  means you have hard, dry bowel movements, or you go longer than usual between bowel movements  Constipation may be caused by a lack of water or high-fiber foods  Certain medicines, or a lack of fiber or physical activity may also cause constipation  Common symptoms include the following:   · Trouble pushing out your bowel movement    · Pain or bleeding during your bowel movement    · A feeling that you did not finish having your bowel movement    · Nausea    · Bloating    · Headache    Call your doctor if:   · You have blood in your bowel movements  · You have a fever and abdominal pain with the constipation  · Your constipation gets worse  · You start to vomit  · You have questions or concerns about your condition or care  Relieve constipation:  Medicine can keep you have a bowel movement more easily  Medicines may increase moisture in your bowel movement or increase the motion of your intestines  · A suppository  may be used to help soften your bowel movements  This may make them easier to pass  A suppository is guided into your rectum through your anus  · Laxatives  can help stimulate your bowels to have a bowel movement  Your provider may recommend you only use laxatives for a short time  Long-term use may make your bowels dependent on the medicine  · An enema  is liquid medicine used to clear bowel movement from your rectum  The medicine is put into your rectum through your anus  Prevent constipation:   · Drink liquids as directed  You may need to drink extra liquids to help soften and move your bowels  Ask how much liquid to drink each day and which liquids are best for you  · Eat high-fiber foods  This may help decrease constipation by adding bulk to your bowel movements  High-fiber foods include fruit, vegetables, whole-grain breads and cereals, and beans   Your healthcare provider or dietitian can help you create a high-fiber meal plan  Your provider may also recommend a fiber supplement if you cannot get enough fiber from food  · Exercise regularly  Regular physical activity can help stimulate your intestines  Walking is a good exercise to prevent or relieve constipation  Ask which exercises are best for you  · Schedule a time each day to have a bowel movement  This may help train your body to have regular bowel movements  Bend forward while you are on the toilet to help move the bowel movement out  Sit on the toilet for at least 10 minutes, even if you do not have a bowel movement  · Talk to your healthcare provider about your medicines  Certain medicines, such as opioids, can cause constipation  Your provider may be able to make medicine changes  For example, he or she may change the kind of medicine, or change when you take it  Follow up with your healthcare provider as directed:  Write down your questions so you remember to ask them during your visits  © [employer] 2021 Information is for End User's use only and may not be sold, redistributed or otherwise used for commercial purposes  All illustrations and images included in CareNotes® are the copyrighted property of A D A M , Inc  or [facility] Nikko Murrell   The above information is an  only  It is not intended as medical advice for individual conditions or treatments  Talk to your doctor, nurse or pharmacist before following any medical regimen to see if it is safe and effective for you  The patient is scheduled at [place] [facility] for a Colon with Dr Kaiden Gonsales on 10/19/2021  Miralax/dulcolax prep instructions have been gone over in the office, with the patient, by the MA  The patient is aware that they will receive a call with the arrival time the day prior to procedure and that they will need a  the day of the procedure   I have asked the patient to call with any questions that they might have prior to procedure

## 2021-08-27 NOTE — PROGRESS NOTES
[facility] - Outpatient Consultation  Chilango Goodson 28 y o  female MRN: [id number]  Encounter: [id number]          ASSESSMENT AND PLAN:  69-year-old female with history of chronic migraine who presents for evaluation  1  Irritable bowel syndrome with constipation  2  Bloating  3  Abnormal CT of the abdomen    She reports a 2 year history of constipation and has failed multiple treatments in the last including MiraLax, lactulose, stimulant laxatives and stool softeners  I discussed constipation management with her today including high-fiber diet, adequate hydration  I recommend that she start Linzess 290 micro g daily  I provided her 2 weeks worth of samples today  I discussed that she may experience loose stools in the 1st 10-12 days after starting Linzess  She has no contraindication to starting Linzess  She will call my office with an update of her symptoms in 2 weeks  We also discussed if she should do a clean out with a bowel prep prior to initiation of Linzess, given that she is having intermittent bowel movements a few times weekly this is not likely required  If she has no improvement after starting the Linzess she may benefit from MiraLax/Dulcolax bowel prep and then re-initiation of the Linzess after  Given her abnormal CT scan suggestive of colitis I recommend colonoscopy be performed once her constipation is improved  - [employer] (Linzess) [address]; Take 1 capsule by mouth daily with breakfast  Dispense: 30 capsule; Refill: 3  - Colonoscopy; Future    Follow-up in 3 months    ______________________________________________________________________    HPI:  69-year-old female with history of chronic migraine who presents for evaluation  She reports 2 year history of constipation  Without use of laxative she can go 3-4 weeks without a bowel movement  This is usually Maries type 1-2 stool during those episodes    She has tried multiple laxatives, fiber and stool softeners in the past   She reports trying MiraLax twice daily for a few weeks with no improvement of her symptoms  She has also use lactulose with no affect  She is currently using over-the-counter stimulant laxative in addition to stool softeners every day and on this regimen she has intermittent liquid stools which is soft  She had 1 episode of rectal bleeding and abdominal pain in the setting of constipation presented to the emergency room in June  She currently denies hematochezia or melena  Her appetite is good her weight is stable  She reports intermittent abdominal bloating in the days prior to her bowel movement  She has no family history of colon cancer   She takes no anti-platelet or anticoagulant medications      June 2021 CT with mild wall thickening of the descending colon suggestive of colitis versus underdistention  2019 CT scan showed the stool filled colon consistent with constipation      REVIEW OF SYSTEMS:    CONSTITUTIONAL: Denies any fever, chills, rigors, and weight loss  HEENT: No earache or tinnitus  Denies hearing loss or visual disturbances  CARDIOVASCULAR: No chest pain or palpitations  RESPIRATORY: Denies any cough, hemoptysis, shortness of breath or dyspnea on exertion  GASTROINTESTINAL: As noted in the History of Present Illness  GENITOURINARY: No problems with urination  Denies any hematuria or dysuria  NEUROLOGIC: No dizziness or vertigo, denies headaches  MUSCULOSKELETAL: Denies any muscle or joint pain  SKIN: Denies skin rashes or itching  ENDOCRINE: Denies excessive thirst  Denies intolerance to heat or cold  PSYCHOSOCIAL: Denies depression or anxiety  Denies any recent memory loss         Historical Information   Past Medical History:   Diagnosis Date    Frequent headaches      Past Surgical History:   Procedure Laterality Date    NO PAST SURGERIES       Social History   Social History     Substance and Sexual Activity Alcohol Use Not Currently     Social History     Substance and Sexual Activity   Drug Use Not Currently     Social History     Tobacco Use   Smoking Status Former Smoker    Packs/day: 0 00    Types: Cigarettes   Smokeless Tobacco Never Used   Tobacco Comment    3 cigarettes a day      Family History   Problem Relation Age of Onset    No Known Problems Mother     No Known Problems Father        Meds/Allergies       Current Outpatient Medications:     Cholecalciferol (Vitamin D3) 50 MCG (2000 UT) TABS    cyproheptadine (PERIACTIN) 4 mg tablet    docusate sodium (COLACE) 100 mg capsule    lactulose 20 g/30 mL    loratadine (CLARITIN) 10 mg tablet    magnesium 30 MG tablet    nortriptyline (PAMELOR) 10 mg capsule    polyethylene glycol (GLYCOLAX) 17 GM/SCOOP powder    pseudoephedrine (SUDAFED) 30 mg tablet    psyllium (METAMUCIL SMOOTH TEXTURE) 28 % packet    Riboflavin (Vitamin B-2) 100 MG TABS    Rimegepant Sulfate (Nurtec) 75 MG TBDP    rizatriptan (MAXALT) 5 MG tablet    Allergies   Allergen Reactions    Apple - Food Allergy Anaphylaxis           Objective     Blood pressure 101/69, pulse 65, temperature 98 4 °F (36 9 °C), weight 62 9 kg (138 lb 9 6 oz), last menstrual period 08/01/2021, not currently breastfeeding  There is no height or weight on file to calculate BMI  PHYSICAL EXAM:      General Appearance:   Alert, cooperative, no distress   HEENT:   Normocephalic, atraumatic, anicteric  Neck:  Supple, symmetrical, trachea midline   Lungs:   Clear to auscultation bilaterally; no rales, rhonchi or wheezing; respirations unlabored    Heart[de-identified]   Regular rate and rhythm; no murmur, rub, or gallop     Abdomen:   Soft, mild lower abdominal tenderness to deep palpation without rebound or guarding non-distended; normal bowel sounds; no masses, no organomegaly    Genitalia:   Deferred    Rectal:   Deferred    Extremities:  No cyanosis, clubbing or edema    Pulses:  2+ and symmetric    Skin: No jaundice, rashes, or lesions    Lymph nodes:  No palpable cervical lymphadenopathy        Lab Results:   No visits with results within 1 Day(s) from this visit     Latest known visit with results is:   Admission on 08/09/2021, Discharged on 08/09/2021   Component Date Value    WBC 08/09/2021 5 30     RBC 08/09/2021 4 58     Hemoglobin 08/09/2021 13 5     Hematocrit 08/09/2021 39 9     MCV 08/09/2021 87     MCH 08/09/2021 29 4     MCHC 08/09/2021 33 8     RDW 08/09/2021 13 4     MPV 08/09/2021 9 9     Platelets 97/20/0518 205     Neutrophils Relative 08/09/2021 60     Lymphocytes Relative 08/09/2021 28     Monocytes Relative 08/09/2021 9     Eosinophils Relative 08/09/2021 2     Basophils Relative 08/09/2021 1     Neutrophils Absolute 08/09/2021 3 20     Lymphocytes Absolute 08/09/2021 1 50     Monocytes Absolute 08/09/2021 0 50     Eosinophils Absolute 08/09/2021 0 10     Basophils Absolute 08/09/2021 0 00     Sodium 08/09/2021 140     Potassium 08/09/2021 4 0     Chloride 08/09/2021 103     CO2 08/09/2021 27     ANION GAP 08/09/2021 10     BUN 08/09/2021 8     Creatinine 08/09/2021 0 65     Glucose 08/09/2021 98     Calcium 08/09/2021 9 6     AST 08/09/2021 28     ALT 08/09/2021 14     Alkaline Phosphatase 08/09/2021 45     Total Protein 08/09/2021 7 3     Albumin 08/09/2021 4 4     Total Bilirubin 08/09/2021 0 93     eGFR 08/09/2021 120     Troponin I 08/09/2021 <0 01     Ventricular Rate 08/09/2021 67     Atrial Rate 08/09/2021 67     OK Interval 08/09/2021 120     QRSD Interval 08/09/2021 72     QT Interval 08/09/2021 410     QTC Interval 08/09/2021 433     P Axis 08/09/2021 42     QRS Axis 08/09/2021 43     T Wave Axis 08/09/2021 20     Ventricular Rate 08/09/2021 62     Atrial Rate 08/09/2021 62     OK Interval 08/09/2021 128     QRSD Interval 08/09/2021 74     QT Interval 08/09/2021 414     QTC Interval 08/09/2021 420     P Axis 08/09/2021 37     QRS Axis 08/09/2021 39     T Wave Axis 08/09/2021 20          Radiology Results:   XR chest 2 views    Result Date: 8/9/2021  Narrative: CHEST INDICATION:   chest pain  COMPARISON:  2/22/2019 EXAM PERFORMED/VIEWS:  XR CHEST PA & LATERAL FINDINGS: Cardiomediastinal silhouette appears unremarkable  The lungs are clear  No pneumothorax or pleural effusion  Osseous structures appear within normal limits for patient age  Impression: No acute cardiopulmonary disease   Workstation performed: [id number]

## 2021-09-01 ENCOUNTER — TELEPHONE (OUTPATIENT)
Dept: GASTROENTEROLOGY | Facility: MEDICAL CENTER | Age: 31
End: 2021-09-01

## 2021-09-07 NOTE — TELEPHONE ENCOUNTER
The patient has tried and failed multiple medications before  From my last office note: "She reports a 2 year history of constipation and has failed multiple treatments in the last including MiraLax, lactulose, stimulant laxatives and stool softeners "    Please check with the insurance which medications she needs to have failed to have linzess approved   Thanks

## 2021-09-08 ENCOUNTER — TELEPHONE (OUTPATIENT)
Dept: GASTROENTEROLOGY | Facility: MEDICAL CENTER | Age: 31
End: 2021-09-08

## 2021-09-09 ENCOUNTER — PATIENT MESSAGE (OUTPATIENT)
Dept: NEUROLOGY | Facility: CLINIC | Age: 31
End: 2021-09-09

## 2021-09-09 DIAGNOSIS — G43.701 CHRONIC MIGRAINE WITHOUT AURA WITH STATUS MIGRAINOSUS, NOT INTRACTABLE: ICD-10-CM

## 2021-09-10 RX ORDER — KETOROLAC TROMETHAMINE 10 MG/1
10 TABLET, FILM COATED ORAL EVERY 8 HOURS PRN
Qty: 20 TABLET | Refills: 0 | Status: SHIPPED | OUTPATIENT
Start: 2021-09-10 | End: 2021-09-17 | Stop reason: SDUPTHER

## 2021-09-10 RX ORDER — METOCLOPRAMIDE 5 MG/1
5 TABLET ORAL EVERY 8 HOURS PRN
Qty: 20 TABLET | Refills: 0 | Status: SHIPPED | OUTPATIENT
Start: 2021-09-10 | End: 2021-09-17 | Stop reason: SDUPTHER

## 2021-09-10 RX ORDER — NORTRIPTYLINE HYDROCHLORIDE 25 MG/1
25 CAPSULE ORAL
Qty: 30 CAPSULE | Refills: 2 | Status: SHIPPED | OUTPATIENT
Start: 2021-09-10 | End: 2022-02-01

## 2021-09-10 NOTE — TELEPHONE ENCOUNTER
From: Roverto Gloria  To: LEIGHANN Valerio  Sent: 9/9/2021 2:18 PM EDT  Subject: Prescription Question    Khurram Clayton,    I wanted to write you on my medications  The night pills there not working for me  The Rizatriptan as well when I take them for my migranes I feel they make them stronger rather than taking them away  I have been having a migraine for the past couple of days and nothing seems to help me  Is there anything that you can do to help me?

## 2021-09-15 ENCOUNTER — TELEPHONE (OUTPATIENT)
Dept: GASTROENTEROLOGY | Facility: MEDICAL CENTER | Age: 31
End: 2021-09-15

## 2021-09-15 NOTE — TELEPHONE ENCOUNTER
----- Message from Jackie Vasquez MD sent at 9/15/2021  8:49 AM EDT -----  Please give patient 2 weeks sample of Linzess 290mcg she will come pick it up

## 2021-09-17 DIAGNOSIS — G43.701 CHRONIC MIGRAINE WITHOUT AURA WITH STATUS MIGRAINOSUS, NOT INTRACTABLE: ICD-10-CM

## 2021-09-17 RX ORDER — KETOROLAC TROMETHAMINE 10 MG/1
10 TABLET, FILM COATED ORAL EVERY 8 HOURS PRN
Qty: 20 TABLET | Refills: 0 | Status: SHIPPED | OUTPATIENT
Start: 2021-09-17 | End: 2022-04-10 | Stop reason: SDUPTHER

## 2021-09-17 RX ORDER — NORTRIPTYLINE HYDROCHLORIDE 25 MG/1
25 CAPSULE ORAL
Qty: 30 CAPSULE | Refills: 0 | Status: CANCELLED | OUTPATIENT
Start: 2021-09-17

## 2021-09-17 RX ORDER — METOCLOPRAMIDE 5 MG/1
5 TABLET ORAL EVERY 8 HOURS PRN
Qty: 20 TABLET | Refills: 0 | Status: SHIPPED | OUTPATIENT
Start: 2021-09-17 | End: 2022-03-14

## 2021-09-17 NOTE — TELEPHONE ENCOUNTER
Matilde - Per patients mychart message, her son threw out medication  Script did not have refills  Please advise if you would be willing to send again, patient will likely have to pay out of pocket for medication

## 2021-09-29 ENCOUNTER — TELEPHONE (OUTPATIENT)
Dept: GASTROENTEROLOGY | Facility: MEDICAL CENTER | Age: 31
End: 2021-09-29

## 2021-09-29 NOTE — TELEPHONE ENCOUNTER
Left message for patient that both notes for herself and her fiance are ready for her   Waiting for return call to see if letters should be mailed or if patient prefers to pick them up

## 2021-09-29 NOTE — TELEPHONE ENCOUNTER
----- Message from Alistair Rice MD sent at 9/28/2021  5:05 PM EDT -----  Yes she can have the requested excuse notes  Thanks

## 2021-10-04 ENCOUNTER — TELEPHONE (OUTPATIENT)
Dept: GASTROENTEROLOGY | Facility: MEDICAL CENTER | Age: 31
End: 2021-10-04

## 2021-10-11 ENCOUNTER — TELEPHONE (OUTPATIENT)
Dept: GASTROENTEROLOGY | Facility: MEDICAL CENTER | Age: 31
End: 2021-10-11

## 2021-10-12 ENCOUNTER — OFFICE VISIT (OUTPATIENT)
Dept: FAMILY MEDICINE CLINIC | Facility: CLINIC | Age: 31
End: 2021-10-12

## 2021-10-12 VITALS
OXYGEN SATURATION: 98 % | BODY MASS INDEX: 29.86 KG/M2 | HEIGHT: 57 IN | RESPIRATION RATE: 16 BRPM | WEIGHT: 138.4 LBS | DIASTOLIC BLOOD PRESSURE: 78 MMHG | TEMPERATURE: 98.2 F | SYSTOLIC BLOOD PRESSURE: 110 MMHG | HEART RATE: 105 BPM

## 2021-10-12 DIAGNOSIS — M72.2 BILATERAL PLANTAR FASCIITIS: Primary | ICD-10-CM

## 2021-10-12 PROCEDURE — 99213 OFFICE O/P EST LOW 20 MIN: CPT | Performed by: PHYSICIAN ASSISTANT

## 2021-10-12 RX ORDER — METHYLPREDNISOLONE 4 MG/1
TABLET ORAL
Qty: 21 EACH | Refills: 0 | Status: SHIPPED | OUTPATIENT
Start: 2021-10-12 | End: 2022-02-01

## 2021-10-12 RX ORDER — NAPROXEN 500 MG/1
500 TABLET ORAL 2 TIMES DAILY PRN
Qty: 30 TABLET | Refills: 1 | Status: SHIPPED | OUTPATIENT
Start: 2021-10-12 | End: 2022-05-27

## 2021-10-18 ENCOUNTER — ANESTHESIA EVENT (OUTPATIENT)
Dept: ANESTHESIOLOGY | Facility: HOSPITAL | Age: 31
End: 2021-10-18

## 2021-10-18 ENCOUNTER — ANESTHESIA (OUTPATIENT)
Dept: ANESTHESIOLOGY | Facility: HOSPITAL | Age: 31
End: 2021-10-18

## 2021-10-18 ENCOUNTER — TELEPHONE (OUTPATIENT)
Dept: GASTROENTEROLOGY | Facility: MEDICAL CENTER | Age: 31
End: 2021-10-18

## 2021-10-19 ENCOUNTER — ANESTHESIA (OUTPATIENT)
Dept: GASTROENTEROLOGY | Facility: MEDICAL CENTER | Age: 31
End: 2021-10-19

## 2021-10-19 ENCOUNTER — ANESTHESIA EVENT (OUTPATIENT)
Dept: GASTROENTEROLOGY | Facility: MEDICAL CENTER | Age: 31
End: 2021-10-19

## 2021-10-19 ENCOUNTER — HOSPITAL ENCOUNTER (OUTPATIENT)
Dept: GASTROENTEROLOGY | Facility: MEDICAL CENTER | Age: 31
Setting detail: OUTPATIENT SURGERY
Discharge: HOME | End: 2021-10-19
Payer: COMMERCIAL

## 2021-10-19 VITALS
HEART RATE: 78 BPM | SYSTOLIC BLOOD PRESSURE: 101 MMHG | BODY MASS INDEX: 29.77 KG/M2 | DIASTOLIC BLOOD PRESSURE: 61 MMHG | OXYGEN SATURATION: 100 % | RESPIRATION RATE: 18 BRPM | HEIGHT: 57 IN | WEIGHT: 138 LBS | TEMPERATURE: 98.3 F

## 2021-10-19 DIAGNOSIS — R93.5 ABNORMAL CT OF THE ABDOMEN: ICD-10-CM

## 2021-10-19 DIAGNOSIS — K58.1 IRRITABLE BOWEL SYNDROME WITH CONSTIPATION: ICD-10-CM

## 2021-10-19 LAB
EXT PREGNANCY TEST URINE: NEGATIVE
EXT. CONTROL: NORMAL

## 2021-10-19 PROCEDURE — 45378 DIAGNOSTIC COLONOSCOPY: CPT | Performed by: INTERNAL MEDICINE

## 2021-10-19 PROCEDURE — 81025 URINE PREGNANCY TEST: CPT | Performed by: ANESTHESIOLOGY

## 2021-10-19 RX ORDER — LIDOCAINE HYDROCHLORIDE 20 MG/ML
INJECTION, SOLUTION EPIDURAL; INFILTRATION; INTRACAUDAL; PERINEURAL AS NEEDED
Status: DISCONTINUED | OUTPATIENT
Start: 2021-10-19 | End: 2021-10-19

## 2021-10-19 RX ORDER — SODIUM CHLORIDE 9 MG/ML
125 INJECTION, SOLUTION INTRAVENOUS CONTINUOUS
Status: DISCONTINUED | OUTPATIENT
Start: 2021-10-19 | End: 2021-10-19

## 2021-10-19 RX ORDER — PROPOFOL 10 MG/ML
INJECTION, EMULSION INTRAVENOUS AS NEEDED
Status: DISCONTINUED | OUTPATIENT
Start: 2021-10-19 | End: 2021-10-19

## 2021-10-19 RX ADMIN — PROPOFOL 50 MG: 10 INJECTION, EMULSION INTRAVENOUS at 09:27

## 2021-10-19 RX ADMIN — PROPOFOL 50 MG: 10 INJECTION, EMULSION INTRAVENOUS at 09:22

## 2021-10-19 RX ADMIN — SODIUM CHLORIDE 125 ML/HR: 0.9 INJECTION, SOLUTION INTRAVENOUS at 08:28

## 2021-10-19 RX ADMIN — PROPOFOL 50 MG: 10 INJECTION, EMULSION INTRAVENOUS at 09:06

## 2021-10-19 RX ADMIN — PROPOFOL 100 MG: 10 INJECTION, EMULSION INTRAVENOUS at 09:03

## 2021-10-19 RX ADMIN — PROPOFOL 50 MG: 10 INJECTION, EMULSION INTRAVENOUS at 09:08

## 2021-10-19 RX ADMIN — PROPOFOL 50 MG: 10 INJECTION, EMULSION INTRAVENOUS at 09:11

## 2021-10-19 RX ADMIN — PROPOFOL 50 MG: 10 INJECTION, EMULSION INTRAVENOUS at 09:18

## 2021-10-19 RX ADMIN — LIDOCAINE HYDROCHLORIDE 60 MG: 20 INJECTION, SOLUTION EPIDURAL; INFILTRATION; INTRACAUDAL; PERINEURAL at 09:03

## 2021-11-01 ENCOUNTER — HOSPITAL ENCOUNTER (EMERGENCY)
Facility: HOSPITAL | Age: 31
Discharge: HOME | End: 2021-11-01
Attending: EMERGENCY MEDICINE | Admitting: EMERGENCY MEDICINE
Payer: COMMERCIAL

## 2021-11-01 VITALS
SYSTOLIC BLOOD PRESSURE: 116 MMHG | BODY MASS INDEX: 29.97 KG/M2 | TEMPERATURE: 98 F | WEIGHT: 138.5 LBS | DIASTOLIC BLOOD PRESSURE: 72 MMHG | OXYGEN SATURATION: 100 % | HEART RATE: 92 BPM | RESPIRATION RATE: 91 BRPM

## 2021-11-01 DIAGNOSIS — T14.8XXA HEMATOMA: Primary | ICD-10-CM

## 2021-11-01 PROCEDURE — 99283 EMERGENCY DEPT VISIT LOW MDM: CPT

## 2021-11-01 PROCEDURE — 99282 EMERGENCY DEPT VISIT SF MDM: CPT | Performed by: EMERGENCY MEDICINE

## 2021-11-24 ENCOUNTER — TELEPHONE (OUTPATIENT)
Dept: GASTROENTEROLOGY | Facility: MEDICAL CENTER | Age: 31
End: 2021-11-24

## 2021-12-07 ENCOUNTER — APPOINTMENT (EMERGENCY)
Dept: RADIOLOGY | Facility: HOSPITAL | Age: 31
End: 2021-12-07
Payer: COMMERCIAL

## 2021-12-07 ENCOUNTER — HOSPITAL ENCOUNTER (EMERGENCY)
Facility: HOSPITAL | Age: 31
Discharge: HOME | End: 2021-12-07
Attending: EMERGENCY MEDICINE
Payer: COMMERCIAL

## 2021-12-07 ENCOUNTER — TELEPHONE (OUTPATIENT)
Dept: PODIATRY | Facility: CLINIC | Age: 31
End: 2021-12-07

## 2021-12-07 VITALS
HEART RATE: 79 BPM | OXYGEN SATURATION: 99 % | TEMPERATURE: 97.8 F | DIASTOLIC BLOOD PRESSURE: 72 MMHG | SYSTOLIC BLOOD PRESSURE: 117 MMHG | BODY MASS INDEX: 29.58 KG/M2 | WEIGHT: 136.7 LBS | RESPIRATION RATE: 20 BRPM

## 2021-12-07 DIAGNOSIS — R68.89 FLU-LIKE SYMPTOMS: Primary | ICD-10-CM

## 2021-12-07 DIAGNOSIS — R07.89 CHEST WALL PAIN: ICD-10-CM

## 2021-12-07 LAB
ANION GAP SERPL CALCULATED.3IONS-SCNC: 5 MMOL/L (ref 5–14)
BASOPHILS # BLD AUTO: 0 THOUSANDS/ΜL (ref 0–0.1)
BASOPHILS NFR BLD AUTO: 0 % (ref 0–1)
BUN SERPL-MCNC: 11 MG/DL (ref 5–25)
CALCIUM SERPL-MCNC: 9.1 MG/DL (ref 8.4–10.2)
CARDIAC TROPONIN I PNL SERPL HS: <2 NG/L (ref 8–18)
CHLORIDE SERPL-SCNC: 108 MMOL/L (ref 97–108)
CO2 SERPL-SCNC: 27 MMOL/L (ref 22–30)
CREAT SERPL-MCNC: 0.81 MG/DL (ref 0.6–1.2)
EOSINOPHIL # BLD AUTO: 0.1 THOUSAND/ΜL (ref 0–0.4)
EOSINOPHIL NFR BLD AUTO: 1 % (ref 0–6)
ERYTHROCYTE [DISTWIDTH] IN BLOOD BY AUTOMATED COUNT: 13.3 %
EXT PREG TEST URINE: NEGATIVE
EXT. CONTROL ED NAV: NORMAL
FLUAV RNA RESP QL NAA+PROBE: NEGATIVE
FLUBV RNA RESP QL NAA+PROBE: NEGATIVE
GFR SERPL CREATININE-BSD FRML MDRD: 98 ML/MIN/1.73SQ M
GLUCOSE SERPL-MCNC: 105 MG/DL (ref 70–99)
HCT VFR BLD AUTO: 42.3 % (ref 36–46)
HGB BLD-MCNC: 14.2 G/DL (ref 12–16)
LYMPHOCYTES # BLD AUTO: 2.1 THOUSANDS/ΜL (ref 0.5–4)
LYMPHOCYTES NFR BLD AUTO: 26 % (ref 25–45)
MCH RBC QN AUTO: 29.8 PG (ref 26–34)
MCHC RBC AUTO-ENTMCNC: 33.6 G/DL (ref 31–36)
MCV RBC AUTO: 89 FL (ref 80–100)
MONOCYTES # BLD AUTO: 0.8 THOUSAND/ΜL (ref 0.2–0.9)
MONOCYTES NFR BLD AUTO: 10 % (ref 1–10)
NEUTROPHILS # BLD AUTO: 5 THOUSANDS/ΜL (ref 1.8–7.8)
NEUTS SEG NFR BLD AUTO: 63 % (ref 45–65)
PLATELET # BLD AUTO: 246 THOUSANDS/UL (ref 150–450)
PMV BLD AUTO: 9.8 FL (ref 8.9–12.7)
POTASSIUM SERPL-SCNC: 4 MMOL/L (ref 3.6–5)
RBC # BLD AUTO: 4.76 MILLION/UL (ref 4–5.2)
RSV RNA RESP QL NAA+PROBE: NEGATIVE
SARS-COV-2 RNA RESP QL NAA+PROBE: NEGATIVE
SODIUM SERPL-SCNC: 140 MMOL/L (ref 137–147)
WBC # BLD AUTO: 7.9 THOUSAND/UL (ref 4.5–11)

## 2021-12-07 PROCEDURE — 99285 EMERGENCY DEPT VISIT HI MDM: CPT | Performed by: PHYSICIAN ASSISTANT

## 2021-12-07 PROCEDURE — 93005 ELECTROCARDIOGRAM TRACING: CPT

## 2021-12-07 PROCEDURE — 81025 URINE PREGNANCY TEST: CPT | Performed by: PHYSICIAN ASSISTANT

## 2021-12-07 PROCEDURE — 84484 ASSAY OF TROPONIN QUANT: CPT | Performed by: PHYSICIAN ASSISTANT

## 2021-12-07 PROCEDURE — 99284 EMERGENCY DEPT VISIT MOD MDM: CPT

## 2021-12-07 PROCEDURE — 0241U HB NFCT DS VIR RESP RNA 4 TRGT: CPT | Performed by: PHYSICIAN ASSISTANT

## 2021-12-07 PROCEDURE — 36415 COLL VENOUS BLD VENIPUNCTURE: CPT | Performed by: PHYSICIAN ASSISTANT

## 2021-12-07 PROCEDURE — 80048 BASIC METABOLIC PNL TOTAL CA: CPT | Performed by: PHYSICIAN ASSISTANT

## 2021-12-07 PROCEDURE — 71045 X-RAY EXAM CHEST 1 VIEW: CPT

## 2021-12-07 PROCEDURE — 85025 COMPLETE CBC W/AUTO DIFF WBC: CPT | Performed by: PHYSICIAN ASSISTANT

## 2021-12-07 RX ORDER — BENZONATATE 100 MG/1
100 CAPSULE ORAL EVERY 8 HOURS
Qty: 21 CAPSULE | Refills: 0 | Status: SHIPPED | OUTPATIENT
Start: 2021-12-07 | End: 2022-05-27

## 2021-12-08 LAB
ATRIAL RATE: 75 BPM
P AXIS: 46 DEGREES
PR INTERVAL: 124 MS
QRS AXIS: 54 DEGREES
QRSD INTERVAL: 78 MS
QT INTERVAL: 398 MS
QTC INTERVAL: 444 MS
T WAVE AXIS: 17 DEGREES
VENTRICULAR RATE: 75 BPM

## 2021-12-08 PROCEDURE — 93010 ELECTROCARDIOGRAM REPORT: CPT

## 2021-12-14 ENCOUNTER — OFFICE VISIT (OUTPATIENT)
Dept: GASTROENTEROLOGY | Facility: MEDICAL CENTER | Age: 31
End: 2021-12-14
Payer: COMMERCIAL

## 2021-12-14 VITALS
HEART RATE: 78 BPM | DIASTOLIC BLOOD PRESSURE: 72 MMHG | TEMPERATURE: 97.3 F | WEIGHT: 135.2 LBS | BODY MASS INDEX: 29.26 KG/M2 | SYSTOLIC BLOOD PRESSURE: 113 MMHG

## 2021-12-14 DIAGNOSIS — K59.01 SLOW TRANSIT CONSTIPATION: Primary | ICD-10-CM

## 2021-12-14 DIAGNOSIS — R10.84 GENERALIZED ABDOMINAL PAIN: ICD-10-CM

## 2021-12-14 DIAGNOSIS — R12 HEARTBURN: ICD-10-CM

## 2021-12-14 PROCEDURE — 99214 OFFICE O/P EST MOD 30 MIN: CPT | Performed by: PHYSICIAN ASSISTANT

## 2021-12-21 ENCOUNTER — OFFICE VISIT (OUTPATIENT)
Dept: URGENT CARE | Facility: MEDICAL CENTER | Age: 31
End: 2021-12-21
Payer: COMMERCIAL

## 2021-12-21 VITALS — BODY MASS INDEX: 27.21 KG/M2 | RESPIRATION RATE: 16 BRPM | WEIGHT: 135 LBS | HEIGHT: 59 IN

## 2021-12-21 DIAGNOSIS — B34.9 VIRAL INFECTION: Primary | ICD-10-CM

## 2021-12-21 PROCEDURE — 99213 OFFICE O/P EST LOW 20 MIN: CPT

## 2021-12-21 PROCEDURE — 87636 SARSCOV2 & INF A&B AMP PRB: CPT

## 2021-12-24 LAB
FLUAV RNA RESP QL NAA+PROBE: NEGATIVE
FLUBV RNA RESP QL NAA+PROBE: NEGATIVE
SARS-COV-2 RNA RESP QL NAA+PROBE: NEGATIVE

## 2021-12-26 ENCOUNTER — TELEPHONE (OUTPATIENT)
Dept: URGENT CARE | Facility: MEDICAL CENTER | Age: 31
End: 2021-12-26

## 2022-01-03 DIAGNOSIS — K58.1 IRRITABLE BOWEL SYNDROME WITH CONSTIPATION: ICD-10-CM

## 2022-01-03 RX ORDER — LINACLOTIDE 290 UG/1
290 CAPSULE, GELATIN COATED ORAL
Qty: 30 CAPSULE | Refills: 3 | Status: SHIPPED | OUTPATIENT
Start: 2022-01-03 | End: 2022-02-01 | Stop reason: SDUPTHER

## 2022-01-04 ENCOUNTER — TELEPHONE (OUTPATIENT)
Dept: OTHER | Facility: OTHER | Age: 32
End: 2022-01-04

## 2022-01-04 NOTE — TELEPHONE ENCOUNTER
Pt called, requesting a call back from office, at their [employer] to verify the status of the prior authorization   The pharmacy number is [id number]

## 2022-01-05 NOTE — TELEPHONE ENCOUNTER
Patient called and said she needs a prior authorization for Linzess  She would like to know if someone can call the pharmacy to figure out what they need  She said she went to the pharmacy twice now and both times they have given her issues

## 2022-01-06 DIAGNOSIS — K59.01 SLOW TRANSIT CONSTIPATION: Primary | ICD-10-CM

## 2022-01-06 RX ORDER — MAG HYDROX/ALUMINUM HYD/SIMETH 400-400-40
1 SUSPENSION, ORAL (FINAL DOSE FORM) ORAL AS NEEDED
Qty: 12 SUPPOSITORY | Refills: 0 | Status: SHIPPED | OUTPATIENT
Start: 2022-01-06 | End: 2022-03-14

## 2022-01-24 ENCOUNTER — TELEPHONE (OUTPATIENT)
Dept: GASTROENTEROLOGY | Facility: MEDICAL CENTER | Age: 32
End: 2022-01-24

## 2022-01-24 NOTE — TELEPHONE ENCOUNTER
Submitted Prior Auth for Linzess 290 mcg through [employer]  Key: [id number]    Submitted with new information given

## 2022-02-01 DIAGNOSIS — K58.1 IRRITABLE BOWEL SYNDROME WITH CONSTIPATION: ICD-10-CM

## 2022-02-01 DIAGNOSIS — G43.701 CHRONIC MIGRAINE WITHOUT AURA WITH STATUS MIGRAINOSUS, NOT INTRACTABLE: ICD-10-CM

## 2022-02-01 RX ORDER — LINACLOTIDE 290 UG/1
290 CAPSULE, GELATIN COATED ORAL
Qty: 30 CAPSULE | Refills: 3 | Status: SHIPPED | OUTPATIENT
Start: 2022-02-01 | End: 2022-05-19 | Stop reason: SDUPTHER

## 2022-02-01 RX ORDER — NORTRIPTYLINE HYDROCHLORIDE 50 MG/1
50 CAPSULE ORAL
Qty: 90 CAPSULE | Refills: 2 | Status: SHIPPED | OUTPATIENT
Start: 2022-02-01 | End: 2022-05-27

## 2022-02-01 RX ORDER — DEXAMETHASONE 2 MG/1
2 TABLET ORAL
Qty: 5 TABLET | Refills: 0 | Status: SHIPPED | OUTPATIENT
Start: 2022-02-01 | End: 2022-04-08 | Stop reason: SDUPTHER

## 2022-02-01 NOTE — TELEPHONE ENCOUNTER
That's great new  I have resent the Linzess to her pharmacy  Can you please call the patient to let her know?

## 2022-02-02 ENCOUNTER — TELEPHONE (OUTPATIENT)
Dept: NEUROLOGY | Facility: CLINIC | Age: 32
End: 2022-02-02

## 2022-03-01 ENCOUNTER — TELEPHONE (OUTPATIENT)
Dept: NEUROLOGY | Facility: CLINIC | Age: 32
End: 2022-03-01

## 2022-03-01 NOTE — TELEPHONE ENCOUNTER
----- Message from Mayelin Johns sent at 2/1/2022 12:19 PM EST -----  Please schedule patient for follow up with Francois Asencio in the next few weeks for migraines       Thanks

## 2022-03-14 ENCOUNTER — OFFICE VISIT (OUTPATIENT)
Dept: GASTROENTEROLOGY | Facility: MEDICAL CENTER | Age: 32
End: 2022-03-14
Payer: COMMERCIAL

## 2022-03-14 VITALS
DIASTOLIC BLOOD PRESSURE: 69 MMHG | SYSTOLIC BLOOD PRESSURE: 114 MMHG | HEART RATE: 89 BPM | BODY MASS INDEX: 28.07 KG/M2 | TEMPERATURE: 97.9 F | WEIGHT: 139 LBS

## 2022-03-14 DIAGNOSIS — K59.01 SLOW TRANSIT CONSTIPATION: Primary | ICD-10-CM

## 2022-03-14 DIAGNOSIS — R12 HEARTBURN: ICD-10-CM

## 2022-03-14 PROCEDURE — 99214 OFFICE O/P EST MOD 30 MIN: CPT | Performed by: PHYSICIAN ASSISTANT

## 2022-03-14 RX ORDER — OMEPRAZOLE 20 MG/1
20 CAPSULE, DELAYED RELEASE ORAL DAILY
Qty: 30 CAPSULE | Refills: 5 | Status: SHIPPED | OUTPATIENT
Start: 2022-03-14 | End: 2022-03-14

## 2022-03-14 RX ORDER — OMEPRAZOLE 20 MG/1
CAPSULE, DELAYED RELEASE ORAL
Qty: 90 CAPSULE | Refills: 1 | Status: SHIPPED | OUTPATIENT
Start: 2022-03-14 | End: 2022-05-19 | Stop reason: SDUPTHER

## 2022-03-14 NOTE — PROGRESS NOTES
Assessment/Plan:     Diagnoses and all orders for this visit:    Slow transit constipation  Patient has a long history of constipation  She is currently on Linzess 290 mcg and is moving her bowels but unfortunately is complaining of greater than 5 bowel movements per day which she describes as loose and then watery  It's possible that she is on too high of a dose, will give samples of 145 mcg  She will call us and let us know if this works better for her  Depending on how she feels, can consider a KUB to assess for overflow  Heartburn  She does describe intermittent heartburn symptoms for some time, no improvement with improvement of her constipation  Would recommend starting on omeprazole 20 mg daily  Will also give her reflux handout, hopefully with dietary changes she will not need the medication in the future  -     omeprazole (PriLOSEC) 20 mg delayed release capsule; Take 1 capsule (20 mg total) by mouth daily          Subjective:      Patient ID: Kassie Terrazas is a 28 y o  female  HPI     This is a follow-up for heartburn and constipation  At her last visit she was complaining of some intermittent heartburn, thought possibly secondary to constipation however unfortunately there has been no improvement  She states she has had intermittent symptoms in the past as well  She was previously on omeprazole which worked well for her  She is not currently taking anything for her symptoms  She has a long history of constipation and can go up to 1 week without a bowel movement  She was prescribed Linzess 290 mcg  She states she is moving her bowels but can have more than 5 bowel movements per day  She states they start off semi formed in turn into water  She does get abdominal cramping associated with this  She did undergo a colonoscopy in October which only showed small hemorrhoids      Patient Active Problem List   Diagnosis    Constipation    History of dental surgery    Lyme disease  Chronic migraine without aura with status migrainosus, not intractable    Chronic tension-type headache, not intractable    Insomnia    Vapes nicotine containing substance    Generalized abdominal pain    Heartburn     Allergies   Allergen Reactions    Apple - Food Allergy Anaphylaxis     Current Outpatient Medications on File Prior to Visit   Medication Sig    dexamethasone (DECADRON) 2 mg tablet Take 1 tablet (2 mg total) by mouth daily with breakfast    linaCLOtide (Linzess) 290 MCG CAPS Take 1 capsule by mouth daily with breakfast    Rimegepant Sulfate (Nurtec) 75 MG TBDP Take 75 mg by mouth as needed (migraine) No more than one dose in 24 hours    benzonatate (TESSALON PERLES) 100 mg capsule Take 1 capsule (100 mg total) by mouth every 8 (eight) hours (Patient not taking: Reported on 12/21/2021 )    Cholecalciferol (Vitamin D3) 50 MCG (2000 UT) TABS Take 2,000 Units by mouth daily (Patient not taking: Reported on 12/21/2021 )    cyproheptadine (PERIACTIN) 4 mg tablet Take 2 tablets at night (Patient not taking: Reported on 10/12/2021)    ketorolac (TORADOL) 10 mg tablet Take 1 tablet (10 mg total) by mouth every 8 (eight) hours as needed for moderate pain or severe pain (migraine cocktail - one tablet ketorolac + one tablet metoclopramide + one tablet benadryl) (Patient not taking: Reported on 12/21/2021 )    loratadine (CLARITIN) 10 mg tablet Take 1 tablet (10 mg total) by mouth daily (Patient not taking: Reported on 12/21/2021 )    magnesium 30 MG tablet Take 400 mg by mouth 2 (two) times a day (Patient not taking: Reported on 12/21/2021 )    naproxen (Naprosyn) 500 mg tablet Take 1 tablet (500 mg total) by mouth 2 (two) times a day as needed for mild pain (Patient not taking: Reported on 12/21/2021 )    nortriptyline (PAMELOR) 50 mg capsule Take 1 capsule (50 mg total) by mouth daily at bedtime (Patient not taking: Reported on 3/14/2022 )    pseudoephedrine (SUDAFED) 30 mg tablet Take 1 tablet (30 mg total) by mouth every 8 (eight) hours as needed for congestion (Patient not taking: Reported on 5/13/2021)    Riboflavin (Vitamin B-2) 100 MG TABS 2 in the morning and 2 at bedtime (Patient not taking: Reported on 12/21/2021 )    rizatriptan (MAXALT) 5 MG tablet Take 1 tablet (5 mg total) by mouth daily as needed for migraine May repeat in 2 hours if needed (Patient not taking: Reported on 10/12/2021)    [DISCONTINUED] docusate sodium (COLACE) 100 mg capsule Take 1 capsule (100 mg total) by mouth 2 (two) times a day (Patient not taking: Reported on 10/19/2021)    [DISCONTINUED] glycerin adult 2 g suppository Insert 1 suppository into the rectum as needed for constipation (Patient not taking: Reported on 3/14/2022 )    [DISCONTINUED] lactulose 20 g/30 mL Take 15 mL (10 g total) by mouth 2 (two) times a day (Patient not taking: Reported on 8/27/2021)    [DISCONTINUED] metoclopramide (REGLAN) 5 mg tablet Take 1 tablet (5 mg total) by mouth every 8 (eight) hours as needed (migraine cocktail - one tablet ketorolac + one tablet metoclopramide + one tablet benadryl) (Patient not taking: Reported on 10/12/2021)    [DISCONTINUED] polyethylene glycol (GLYCOLAX) 17 GM/SCOOP powder Take 17 g by mouth daily (Patient not taking: Reported on 5/13/2021)    [DISCONTINUED] psyllium (METAMUCIL SMOOTH TEXTURE) 28 % packet Take 1 packet by mouth 2 (two) times a day (Patient not taking: Reported on 8/19/2021)     No current facility-administered medications on file prior to visit       Family History   Problem Relation Age of Onset    No Known Problems Mother     No Known Problems Father      Past Medical History:   Diagnosis Date    Frequent headaches      Social History     Socioeconomic History    Marital status: Single     Spouse name: None    Number of children: None    Years of education: None    Highest education level: None   Occupational History    None   Tobacco Use    Smoking status: Former Smoker Packs/day: 0 00     Types: Cigarettes    Smokeless tobacco: Never Used    Tobacco comment: Quit Oct 2021   Vaping Use    Vaping Use: Never used   Substance and Sexual Activity    Alcohol use: Not Currently    Drug use: Not Currently    Sexual activity: Yes   Other Topics Concern    None   Social History Narrative    ** Merged History Encounter **         Flu shot:no     Social Determinants of Health     Financial Resource Strain: Low Risk     Difficulty of Paying Living Expenses: Not hard at all   Food Insecurity: No Food Insecurity    Worried About [employer] [employer] in the Last Year: Never true    [facility] in the Last Year: Never true   Transportation Needs: No Transportation Needs    Lack of Transportation (Medical): No    Lack of Transportation (Non-Medical): No   Physical Activity: Not on file   Stress: Not on file   Social Connections: Not on file   Intimate Partner Violence: Not on file   Housing Stability: Low Risk     Unable to Pay for Housing in the Last Year: No    Number of Places Lived in the Last Year: 1    Unstable Housing in the Last Year: No     Past Surgical History:   Procedure Laterality Date    WISDOM TOOTH EXTRACTION           Review of Systems   All other systems reviewed and are negative  Objective:      /69   Pulse 89   Temp 97 9 °F (36 6 °C)   Wt 63 kg (139 lb)   BMI 28 07 kg/m²          Physical Exam  Constitutional:       Appearance: Normal appearance  She is well-developed  HENT:      Head: Normocephalic and atraumatic  Eyes:      Conjunctiva/sclera: Conjunctivae normal    Cardiovascular:      Rate and Rhythm: Normal rate and regular rhythm  Pulmonary:      Effort: Pulmonary effort is normal       Breath sounds: Normal breath sounds  Abdominal:      General: Bowel sounds are normal  There is no distension  Palpations: Abdomen is soft  Tenderness: There is no abdominal tenderness     Musculoskeletal:         General: Normal range of motion  Cervical back: Normal range of motion  Skin:     General: Skin is warm and dry  Neurological:      Mental Status: She is alert and oriented to person, place, and time     Psychiatric:         Mood and Affect: Mood normal          Behavior: Behavior normal

## 2022-03-14 NOTE — PATIENT INSTRUCTIONS
GERD (Gastroesophageal Reflux Disease)   AMBULATORY CARE:   Gastroesophageal reflux disease (GERD)  is reflux that happens more than 2 times a week for a few weeks  Reflux means acid and food in your stomach back up into your esophagus  GERD can cause other health problems over time if it is not treated  Common causes of GERD:  GERD often happens because the lower muscle (sphincter) of the esophagus does not close properly  The sphincter normally opens to let food into the stomach  It then closes to keep food and stomach acid in the stomach  If the sphincter does not close properly, stomach acid and food back up (reflux) into the esophagus  The following may increase your risk for GERD:  · Certain foods such as spicy foods, chocolate, foods that contain caffeine, peppermint, and fried foods    · Hiatal hernia    · Certain medicines such as calcium channel blockers (used to treat high blood pressure), allergy medicines, sedatives, or antidepressants    · Pregnancy, obesity, or scleroderma    · Lying down after a meal    · Drinking alcohol or smoking cigarettes    Signs and symptoms:   · Heartburn (burning pain in your chest)    · Pain after meals that spreads to your neck, jaw, or shoulder    · Pain that gets better when you change positions    · Bitter or acid taste in your mouth    · A dry cough    · Trouble swallowing or pain with swallowing    · Hoarseness or a sore throat    · Burping or hiccups    · Feeling full soon after you start eating    Call your local emergency number (911 in the [address] [facility] [address]) if:   · You have severe chest pain and sudden trouble breathing  Seek care immediately if:   · You have trouble breathing after you vomit  · You have trouble swallowing, or pain with swallowing  · Your bowel movements are black, bloody, or tarry-looking  · Your vomit looks like coffee grounds or has blood in it      Call your doctor or gastroenterologist if:   · You feel full and cannot burp or vomit     · You vomit large amounts, or you vomit often  · You are losing weight without trying  · Your symptoms get worse or do not improve with treatment  · You have questions or concerns about your condition or care  Treatment for GERD:   · Medicines  are used to decrease stomach acid  Medicine may also be used to help your lower esophageal sphincter and stomach contract (tighten) more  · Surgery  is done to wrap the upper part of the stomach around the esophageal sphincter  This will strengthen the sphincter and prevent reflux  Manage GERD:       · Do not have foods or drinks that may increase heartburn  These include chocolate, peppermint, fried or fatty foods, drinks that contain caffeine, or carbonated drinks (soda)  Other foods include spicy foods, onions, tomatoes, and tomato-based foods  Do not have foods or drinks that can irritate your esophagus, such as citrus fruits, juices, and alcohol  · Do not eat large meals  When you eat a lot of food at one time, your stomach needs more acid to digest it  Eat 6 small meals each day instead of 3 large meals, and eat slowly  Do not eat meals 2 to 3 hours before bedtime  · Elevate the head of your bed  Place 6-inch blocks under the head of your bed frame  You may also use more than one pillow under your head and shoulders while you sleep  · Maintain a healthy weight  If you are overweight, weight loss may help relieve symptoms of GERD  · Do not smoke  Smoking weakens the lower esophageal sphincter and increases the risk of GERD  Ask your healthcare provider for information if you currently smoke and need help to quit  E-cigarettes or smokeless tobacco still contain nicotine  Talk to your healthcare provider before you use these products  · Do not put pressure on your abdomen  Pressure pushes acid up into your esophagus  Do not wear clothing that is tight around your waist  Do not bend over   Bend at the knees if you need to pick something up  Follow up with your doctor or gastroenterologist as directed:  Write down your questions so you remember to ask them during your visits  © Copyright [employer] 2022 Information is for End User's use only and may not be sold, redistributed or otherwise used for commercial purposes  All illustrations and images included in CareNotes® are the copyrighted property of [employer] Inc  or [facility] Nikko Murrell   The above information is an  only  It is not intended as medical advice for individual conditions or treatments  Talk to your doctor, nurse or pharmacist before following any medical regimen to see if it is safe and effective for you

## 2022-04-08 ENCOUNTER — TELEPHONE (OUTPATIENT)
Dept: NEUROLOGY | Facility: CLINIC | Age: 32
End: 2022-04-08

## 2022-04-08 NOTE — TELEPHONE ENCOUNTER
----- Message from Mayelin Johns sent at 4/8/2022  8:39 AM EDT -----  Regarding: headache patient  Please schedule patient for follow up with Olayinka  The sooner the better  She has missed some appointments and has been calling regarding headaches, just prescribed steroids for her

## 2022-04-10 DIAGNOSIS — G43.701 CHRONIC MIGRAINE WITHOUT AURA WITH STATUS MIGRAINOSUS, NOT INTRACTABLE: ICD-10-CM

## 2022-04-11 ENCOUNTER — HOSPITAL ENCOUNTER (OUTPATIENT)
Dept: RADIOLOGY | Facility: HOSPITAL | Age: 32
Discharge: HOME | End: 2022-04-11
Payer: COMMERCIAL

## 2022-04-11 DIAGNOSIS — K59.01 SLOW TRANSIT CONSTIPATION: ICD-10-CM

## 2022-04-11 PROCEDURE — 74022 RADEX COMPL AQT ABD SERIES: CPT

## 2022-04-12 RX ORDER — KETOROLAC TROMETHAMINE 10 MG/1
10 TABLET, FILM COATED ORAL EVERY 8 HOURS PRN
Qty: 20 TABLET | Refills: 0 | Status: SHIPPED | OUTPATIENT
Start: 2022-04-12 | End: 2022-05-19 | Stop reason: SDUPTHER

## 2022-04-12 RX ORDER — RIMEGEPANT SULFATE 75 MG/75MG
75 TABLET, ORALLY DISINTEGRATING ORAL AS NEEDED
Qty: 8 TABLET | Refills: 0 | Status: SHIPPED | OUTPATIENT
Start: 2022-04-12 | End: 2022-05-19 | Stop reason: SDUPTHER

## 2022-04-18 ENCOUNTER — HOSPITAL ENCOUNTER (EMERGENCY)
Facility: HOSPITAL | Age: 32
Discharge: HOME | End: 2022-04-18
Attending: EMERGENCY MEDICINE
Payer: COMMERCIAL

## 2022-04-18 VITALS
WEIGHT: 140.65 LBS | SYSTOLIC BLOOD PRESSURE: 108 MMHG | HEART RATE: 89 BPM | RESPIRATION RATE: 17 BRPM | TEMPERATURE: 97.5 F | OXYGEN SATURATION: 99 % | BODY MASS INDEX: 28.41 KG/M2 | DIASTOLIC BLOOD PRESSURE: 68 MMHG

## 2022-04-18 DIAGNOSIS — M79.662 BILATERAL CALF PAIN: ICD-10-CM

## 2022-04-18 DIAGNOSIS — M79.661 BILATERAL CALF PAIN: ICD-10-CM

## 2022-04-18 DIAGNOSIS — M79.672 FOOT PAIN, LEFT: ICD-10-CM

## 2022-04-18 DIAGNOSIS — M79.671 FOOT PAIN, RIGHT: Primary | ICD-10-CM

## 2022-04-18 PROCEDURE — 99283 EMERGENCY DEPT VISIT LOW MDM: CPT

## 2022-04-18 PROCEDURE — 99284 EMERGENCY DEPT VISIT MOD MDM: CPT

## 2022-04-18 RX ORDER — IBUPROFEN 600 MG/1
600 TABLET ORAL EVERY 8 HOURS PRN
Qty: 30 TABLET | Refills: 0 | Status: SHIPPED | OUTPATIENT
Start: 2022-04-18

## 2022-04-18 RX ORDER — LIDOCAINE HYDROCHLORIDE 20 MG/ML
JELLY TOPICAL AS NEEDED
Qty: 30 ML | Refills: 0 | Status: SHIPPED | OUTPATIENT
Start: 2022-04-18

## 2022-04-18 NOTE — DISCHARGE INSTRUCTIONS
-call podiatry and state you were in the ED due to pain to see if they can make you an earlier appointment    -use over the counter pain relief  -can try ace bandage to relieve pressure off arches    -wear supportive shoes

## 2022-04-19 NOTE — ED PROVIDER NOTES
History  Chief Complaint   Patient presents with    Foot Pain     b/l foot pain  says it goes from the arch of her foot to her calf  This is a 28 YOF with PMH of b/l foot/calf pain who comes in today with complaint of b/l foot and calf pain  Pt reports she has pain that starts on the bottom of her foot and extends up to her calf  She reports working at [employer] and is on her feet a lot- 10 hour shifts at a time  She has recently bought more supportive shoes  Pt notes she has appointment scheduled with podiatry in May but came in due to the pain  She has been taking Naprosyn at home which only helps a little  She denies numbness, tingling, weakness, swelling  After reviewing her charts, it appears she has had this pain since at least 2021  Prior to Admission Medications   Prescriptions Last Dose Informant Patient Reported? Taking?    Cholecalciferol (Vitamin D3) 50 MCG (2000) TABS  Self Yes No   Sig: Take 2,000 Units by mouth daily   Patient not taking: Reported on 2021    Riboflavin (Vitamin B-2) 100 MG TABS   No No   Si in the morning and 2 at bedtime   Patient not taking: Reported on 2021    Rimegepant Sulfate (Nurtec) 75 MG TBDP   No No   Sig: Take 75 mg by mouth as needed (migraine) No more than one dose in 24 hours   benzonatate (TESSALON PERLES) 100 mg capsule   No No   Sig: Take 1 capsule (100 mg total) by mouth every 8 (eight) hours   Patient not taking: Reported on 2021    cyproheptadine (PERIACTIN) 4 mg tablet   No No   Sig: Take 2 tablets at night   Patient not taking: Reported on 10/12/2021   dexamethasone (DECADRON) 2 mg tablet   No No   Sig: Take 1 tablet (2 mg total) by mouth daily with breakfast   ketorolac (TORADOL) 10 mg tablet   No No   Sig: Take 1 tablet (10 mg total) by mouth every 8 (eight) hours as needed for moderate pain or severe pain (migraine cocktail - one tablet ketorolac + one tablet metoclopramide + one tablet benadryl) linaCLOtide (Linzess) 290 MCG CAPS   No No   Sig: Take 1 capsule by mouth daily with breakfast   loratadine (CLARITIN) 10 mg tablet   No No   Sig: Take 1 tablet (10 mg total) by mouth daily   Patient not taking: Reported on 12/21/2021    magnesium 30 MG tablet   Yes No   Sig: Take 400 mg by mouth 2 (two) times a day   Patient not taking: Reported on 12/21/2021    naproxen (Naprosyn) 500 mg tablet   No No   Sig: Take 1 tablet (500 mg total) by mouth 2 (two) times a day as needed for mild pain   Patient not taking: Reported on 12/21/2021    nortriptyline (PAMELOR) 50 mg capsule   No No   Sig: Take 1 capsule (50 mg total) by mouth daily at bedtime   Patient not taking: Reported on 3/14/2022    omeprazole (PriLOSEC) 20 mg delayed release capsule   No No   Sig: TAKE 1 CAPSULE(20 MG) BY MOUTH DAILY   pseudoephedrine (SUDAFED) 30 mg tablet   No No   Sig: Take 1 tablet (30 mg total) by mouth every 8 (eight) hours as needed for congestion   Patient not taking: Reported on 5/13/2021   rizatriptan (MAXALT) 5 MG tablet   No No   Sig: Take 1 tablet (5 mg total) by mouth daily as needed for migraine May repeat in 2 hours if needed   Patient not taking: Reported on 10/12/2021      Facility-Administered Medications: None       Past Medical History:   Diagnosis Date    Frequent headaches        Past Surgical History:   Procedure Laterality Date    WISDOM TOOTH EXTRACTION         Family History   Problem Relation Age of Onset    No Known Problems Mother     No Known Problems Father      I have reviewed and agree with the history as documented      E-Cigarette/Vaping    E-Cigarette Use Never User      E-Cigarette/Vaping Substances    Nicotine No     THC No     CBD No     Flavoring No     Other No     Unknown No      Social History     Tobacco Use    Smoking status: Former Smoker     Packs/day: 0 00     Types: Cigarettes    Smokeless tobacco: Never Used    Tobacco comment: Quit Oct 2021   Vaping Use    Vaping Use: Never used   Substance Use Topics    Alcohol use: Not Currently    Drug use: Not Currently       Review of Systems   Respiratory: Negative for shortness of breath  Cardiovascular: Negative for chest pain  Gastrointestinal: Negative for nausea and vomiting  Musculoskeletal: Positive for arthralgias (b/l feet and calf pain) and myalgias (b/l calf)  Negative for back pain  Skin: Negative for color change and rash  Neurological: Negative for dizziness, seizures, syncope and light-headedness  All other systems reviewed and are negative  Physical Exam  Physical Exam  Vitals and nursing note reviewed  Constitutional:       General: She is not in acute distress  Appearance: She is well-developed  HENT:      Head: Normocephalic and atraumatic  Eyes:      Conjunctiva/sclera: Conjunctivae normal    Cardiovascular:      Rate and Rhythm: Normal rate and regular rhythm  Heart sounds: No murmur heard  Pulmonary:      Effort: Pulmonary effort is normal  No respiratory distress  Breath sounds: Normal breath sounds  Abdominal:      Palpations: Abdomen is soft  Tenderness: There is no abdominal tenderness  Musculoskeletal:         General: Tenderness (b/l soles of feet) present  Cervical back: Neck supple  Skin:     General: Skin is warm and dry  Capillary Refill: Capillary refill takes less than 2 seconds  Neurological:      General: No focal deficit present  Mental Status: She is alert and oriented to person, place, and time     Psychiatric:         Mood and Affect: Mood normal          Behavior: Behavior normal          Vital Signs  ED Triage Vitals [04/18/22 1743]   Temperature Pulse Respirations Blood Pressure SpO2   97 5 °F (36 4 °C) 89 17 108/68 99 %      Temp Source Heart Rate Source Patient Position - Orthostatic VS BP Location FiO2 (%)   Oral Monitor Lying Right arm --      Pain Score       --           Vitals:    04/18/22 1743   BP: 108/68   Pulse: 89 Patient Position - Orthostatic VS: Lying         Visual Acuity      ED Medications  Medications - No data to display    Diagnostic Studies  Results Reviewed     None                 No orders to display              Procedures  Procedures         ED Course             SBIRT 20yo+      Most Recent Value   SBIRT (24 yo +)    In order to provide better care to our patients, we are screening all of our patients for alcohol and drug use  Would it be okay to ask you these screening questions? No Filed at: 04/18/2022 1818                MDM  Number of Diagnoses or Management Options  Bilateral calf pain  Foot pain, left: established and improving  Foot pain, right: established and improving  Diagnosis management comments: This is a 28 YOF presenting with b/l foot and calf pain  The pain has been present since at least December of last year  She has initial visit scheduled with podiatry in May  No recent falls or trauma to feet/legs  Physical exam revealed some mild tenderness to palpation of b/l soles of feet  Strength, pulses and sensory intact  No signs to suggest DVT or infection  Owens test normal  I suspect this is likely an overuse/strain pattern of injury  We discussed that I could have xray performed of her feet but I highly doubt it would show anything beneficial to her care  She is in agreement with no imaging at this time  We discussed supportive measures such as supportive shoes, stretching, ibuprofen, massages, etc  She has appointment with podiatry already scheduled- recommended she contact them to see if she can have earlier appointment  I have discussed with the patient our plan to discharge them from the ED and the patient is in agreement with this plan  The patient was provided a written after visit summary with strict RTED precautions          Amount and/or Complexity of Data Reviewed  Decide to obtain previous medical records or to obtain history from someone other than the patient: yes  Review and summarize past medical records: yes    Patient Progress  Patient progress: stable      Disposition  Final diagnoses: Foot pain, right   Foot pain, left   Bilateral calf pain     Time reflects when diagnosis was documented in both MDM as applicable and the Disposition within this note     Time User Action Codes Description Comment    4/18/2022  6:21 PM Glenvil Ree Add [U81 [id number]] Foot pain, right     4/18/2022  6:21 PM Sven Ree Add [Z05 [id number]] Foot pain, left     4/18/2022  8:21 PM Sven Ree Add [Z74 [id number],  M79 662] Bilateral calf pain       ED Disposition     ED Disposition Condition Date/Time Comment    Discharge Stable Mon Apr 18, 2022  6:21 PM [address] [facility] discharge to home/self care  Follow-up Information     Follow up With Specialties Details Why Contact Info Additional Information    Dev Doll, [address], Nurse Practitioner  As needed [address]  [address] [facility] [phone]       [address] [facility] Emergency Department Emergency Medicine  If symptoms worsen [facility] [id number]  [address] [facility] Emergency Department, [address] [place], [place], 43 [facility]  If symptoms worsen [address], [address] [facility]  [phone] [address] [place] [address], [phone]     Via the [address] [facility] (North/South) Take [address] toward [facility]  Take the [facility] [address]  Keep right and follow signs for [address]/ [place]  Merge onto [address] [place] [address]  In a half mile, take the exit for [address] toward [facility]  In 0 7 miles take the [facility] [address]  Merge onto [facility]  In 500 feet, turn left on [address] and drive 0 3 miles  [address] will be on your left  [address] ([address]) [address] toward [place]   Take the [facility] [address]  Merge onto [facility]  In 500 feet, turn left on [address] and drive 0 3 miles  [address] will be on your left  [address]) [address] to [address] towards [facility]  In 0 7 miles take the [facility] [address]  Merge onto [facility]  In 500 feet, turn left on [address] and drive 0 3 miles  [address] will be on your left      [address] Orthopedic Surgery   [address] [address]  [address] [facility], [address], [address], [facility], [address] [facility], [address]   [address] [facility] [phone]  [address] [facility] [address], [address], [place], [place], [address] [facility]          Discharge Medication List as of 4/18/2022  6:28 PM      START taking these medications    Details   ibuprofen (MOTRIN) 600 mg tablet Take 1 tablet (600 mg total) by mouth every 8 (eight) hours as needed for mild pain or moderate pain, Starting Mon 4/18/2022, Normal      lidocaine (XYLOCAINE) 2 % topical gel Apply topically as needed for mild pain, Starting Mon 4/18/2022, Normal         CONTINUE these medications which have NOT CHANGED    Details   benzonatate (TESSALON PERLES) 100 mg capsule Take 1 capsule (100 mg total) by mouth every 8 (eight) hours, Starting Tue 12/7/2021, Normal      Cholecalciferol (Vitamin D3) 50 MCG (2000 UT) TABS Take 2,000 Units by mouth daily, Historical Med      cyproheptadine (PERIACTIN) 4 mg tablet Take 2 tablets at night, Normal      dexamethasone (DECADRON) 2 mg tablet Take 1 tablet (2 mg total) by mouth daily with breakfast, Starting Fri 4/8/2022, Normal      ketorolac (TORADOL) 10 mg tablet Take 1 tablet (10 mg total) by mouth every 8 (eight) hours as needed for moderate pain or severe pain (migraine cocktail - one tablet ketorolac + one tablet metoclopramide + one tablet benadryl), Starting Tue 4/12/2022, Normal      linaCLOtide (Linzess) 290 MCG CAPS Take 1 capsule by mouth daily with breakfast, Starting Tue 2/1/2022, Normal      loratadine (CLARITIN) 10 mg tablet Take 1 tablet (10 mg total) by mouth daily, Starting Fri 4/16/2021, Normal      magnesium 30 MG tablet Take 400 mg by mouth 2 (two) times a day, Historical Med      naproxen (Naprosyn) 500 mg tablet Take 1 tablet (500 mg total) by mouth 2 (two) times a day as needed for mild pain, Starting Tue 10/12/2021, Normal      nortriptyline (PAMELOR) 50 mg capsule Take 1 capsule (50 mg total) by mouth daily at bedtime, Starting Tue 2/1/2022, Normal      omeprazole (PriLOSEC) 20 mg delayed release capsule TAKE 1 CAPSULE(20 MG) BY MOUTH DAILY, Normal      pseudoephedrine (SUDAFED) 30 mg tablet Take 1 tablet (30 mg total) by mouth every 8 (eight) hours as needed for congestion, Starting Fri 4/16/2021, Normal      Riboflavin (Vitamin B-2) 100 MG TABS 2 in the morning and 2 at bedtime, Normal      Rimegepant Sulfate (Nurtec) 75 MG TBDP Take 75 mg by mouth as needed (migraine) No more than one dose in 24 hours, Starting Tue 4/12/2022, Normal      rizatriptan (MAXALT) 5 MG tablet Take 1 tablet (5 mg total) by mouth daily as needed for migraine May repeat in 2 hours if needed, Starting Tue 8/17/2021, Normal             No discharge procedures on file      PDMP Review       Value Time User    PDMP Reviewed  Yes 10/1/2020 12:38 AM Kiana Peng DO ED Provider  Electronically Signed by           Iram Nevarez PA-C  04/18/22 2029

## 2022-04-28 ENCOUNTER — OFFICE VISIT (OUTPATIENT)
Dept: FAMILY MEDICINE CLINIC | Facility: CLINIC | Age: 32
End: 2022-04-28

## 2022-04-28 VITALS
RESPIRATION RATE: 18 BRPM | HEIGHT: 59 IN | SYSTOLIC BLOOD PRESSURE: 114 MMHG | HEART RATE: 89 BPM | TEMPERATURE: 97.7 F | WEIGHT: 141 LBS | DIASTOLIC BLOOD PRESSURE: 70 MMHG | BODY MASS INDEX: 28.43 KG/M2 | OXYGEN SATURATION: 98 %

## 2022-04-28 DIAGNOSIS — M79.672 LEFT FOOT PAIN: Primary | ICD-10-CM

## 2022-04-28 DIAGNOSIS — M25.50 MULTIPLE JOINT PAIN: ICD-10-CM

## 2022-04-28 DIAGNOSIS — G43.701 CHRONIC MIGRAINE WITHOUT AURA WITH STATUS MIGRAINOSUS, NOT INTRACTABLE: ICD-10-CM

## 2022-04-28 DIAGNOSIS — A69.20 LYME DISEASE: ICD-10-CM

## 2022-04-28 PROCEDURE — 99213 OFFICE O/P EST LOW 20 MIN: CPT | Performed by: FAMILY MEDICINE

## 2022-04-28 NOTE — PROGRESS NOTES
Assessment/Plan:    Chronic migraine without aura with status migrainosus, not intractable  Following with neurology, difficulty with keeping apts, offered social work to help with transportation needs, patient declines  Is taking Nortriptyline 50 mg daily, and using Nurtec as needed, last taken this morning  Now feels migraine is only mild  Educated can take toradol as well per prescriptions from neuro  Has upcoming apt in [place] with neurology, encouraged patient to be sure to make it there as she has had frequent flares requiring steroids in the past 6 months  Lyme disease  From 2019, continues with multiple joint pain from time to time (back, shoulders, wrists)   Can use voltaren gel topically as needed and follow up with PCP   Referral placed to rheumatology as well and provided with central scheduling number to schedule      Diagnoses and all orders for this visit:    Left foot pain  Comments:  Able to ambulate  Seen at [facility] ED recently, ankle xray negative, however tender around first metatarsal  will get foot xray and ankle as we cannot see xrays from [facility],  Has podiatry apt 5/6/22, patient encouraged to get xray completed prior to this    Can use her Toradol or naproxen prn for foot pain, advised to avoid together and to take with food  Educated to avoid with any other nsaid use as well  Emphasized importance of proper footwear  Orders:  -     XR ankle 3+ vw left; Future  -     XR foot 3+ vw left; Future    Lyme disease  -     Ambulatory Referral to Rheumatology; Future    Multiple joint pain  -     Ambulatory Referral to Rheumatology; Future  -     Diclofenac Sodium (VOLTAREN) 1 %; Apply 2 g topically 4 (four) times a day    Chronic migraine without aura with status migrainosus, not intractable          Subjective:      Patient ID: Elmer Holter is a 28 y o  female      Has history of left foot pain, however started a new job with 10 hr shifts of standing on 4/12 and has increased pain on the inside of foot since then  Seen in the ED recently and work up negative for fracture however continues with medial left foot pain  Has started and otc arthritis cream that helped last night before she went to sleep  The following portions of the patient's history were reviewed and updated as appropriate: allergies, current medications, past family history, past medical history, past social history, past surgical history and problem list     Review of Systems   Constitutional: Negative for chills, fatigue and fever  HENT: Negative for congestion and sore throat  Eyes: Negative for pain  Respiratory: Negative for cough, chest tightness and shortness of breath  Cardiovascular: Negative for palpitations and leg swelling  Gastrointestinal: Negative for abdominal pain, constipation, diarrhea, nausea and vomiting  Genitourinary: Negative for difficulty urinating and dysuria  Musculoskeletal: Positive for arthralgias (left foot pain ) and back pain  Negative for joint swelling, myalgias, neck pain and neck stiffness  Skin: Negative for rash  Neurological: Positive for headaches  Negative for dizziness, weakness and numbness  Psychiatric/Behavioral: The patient is not nervous/anxious  Objective:    /70 (BP Location: Left arm, Patient Position: Sitting, Cuff Size: Adult)   Pulse 89   Temp 97 7 °F (36 5 °C) (Temporal)   Resp 18   Ht 4' 11" (1 499 m)   Wt 64 kg (141 lb)   LMP 04/20/2022   SpO2 98%   BMI 28 48 kg/m²      Physical Exam  Vitals and nursing note reviewed  Constitutional:       Appearance: Normal appearance  HENT:      Head: Normocephalic and atraumatic  Right Ear: External ear normal       Left Ear: External ear normal       Nose: Nose normal  No congestion or rhinorrhea  Eyes:      Extraocular Movements: Extraocular movements intact        Conjunctiva/sclera: Conjunctivae normal    Pulmonary:      Effort: Pulmonary effort is normal  No respiratory distress  Musculoskeletal:         General: Swelling, tenderness and signs of injury present  No deformity  Right lower leg: No edema  Left lower leg: No edema  Comments: Mild swelling and tenderness at base of 5th metatarsal, no erythema or skin changes noted  Skin:     General: Skin is warm  Capillary Refill: Capillary refill takes less than 2 seconds  Findings: No bruising, erythema or rash  Neurological:      Mental Status: She is alert  Motor: No weakness        Gait: Gait normal            Gentry Naidu MD  04/28/22  1:54 PM

## 2022-04-28 NOTE — ASSESSMENT & PLAN NOTE
From 2019, continues with multiple joint pain from time to time (back, shoulders, wrists)   Can use voltaren gel topically as needed and follow up with PCP   Referral placed to rheumatology as well and provided with central scheduling number to schedule

## 2022-04-28 NOTE — ASSESSMENT & PLAN NOTE
Following with neurology, difficulty with keeping apts, offered social work to help with transportation needs, patient declines  Is taking Nortriptyline 50 mg daily, and using Nurtec as needed, last taken this morning  Now feels migraine is only mild  Educated can take toradol as well per prescriptions from neuro  Has upcoming apt in [place] with neurology, encouraged patient to be sure to make it there as she has had frequent flares requiring steroids in the past 6 months

## 2022-04-28 NOTE — PATIENT INSTRUCTIONS
[phone] (this is central scheduling number to set up the rheumatology apt for your joint/bone pain given history of lyme disease)

## 2022-05-06 ENCOUNTER — OFFICE VISIT (OUTPATIENT)
Dept: PODIATRY | Facility: CLINIC | Age: 32
End: 2022-05-06
Payer: COMMERCIAL

## 2022-05-06 VITALS
SYSTOLIC BLOOD PRESSURE: 104 MMHG | WEIGHT: 139 LBS | DIASTOLIC BLOOD PRESSURE: 71 MMHG | BODY MASS INDEX: 28.02 KG/M2 | HEIGHT: 59 IN | HEART RATE: 80 BPM

## 2022-05-06 DIAGNOSIS — M72.2 BILATERAL PLANTAR FASCIITIS: Primary | ICD-10-CM

## 2022-05-06 PROCEDURE — 99202 OFFICE O/P NEW SF 15 MIN: CPT | Performed by: PODIATRIST

## 2022-05-06 PROCEDURE — 20550 NJX 1 TENDON SHEATH/LIGAMENT: CPT | Performed by: PODIATRIST

## 2022-05-06 RX ORDER — LIDOCAINE HYDROCHLORIDE 10 MG/ML
1 INJECTION, SOLUTION INFILTRATION; PERINEURAL ONCE
Status: COMPLETED | OUTPATIENT
Start: 2022-05-06 | End: 2022-05-06

## 2022-05-06 RX ORDER — TRIAMCINOLONE ACETONIDE 40 MG/ML
40 INJECTION, SUSPENSION INTRA-ARTICULAR; INTRAMUSCULAR ONCE
Status: COMPLETED | OUTPATIENT
Start: 2022-05-06 | End: 2022-05-06

## 2022-05-06 RX ADMIN — LIDOCAINE HYDROCHLORIDE 1 ML: 10 INJECTION, SOLUTION INFILTRATION; PERINEURAL at 11:12

## 2022-05-06 RX ADMIN — TRIAMCINOLONE ACETONIDE 40 MG: 40 INJECTION, SUSPENSION INTRA-ARTICULAR; INTRAMUSCULAR at 11:12

## 2022-05-06 NOTE — LETTER
May 9, 2022     Jackie Gleason, [address]  [address] [facility]  [place] [address] [facility]    Patient: Sheila Card   YOB: 1990   Date of Visit: 5/6/2022       Dear Dr Nima Cartwright: Thank you for referring Sheila Card to me for evaluation  Below are my notes for this consultation  If you have questions, please do not hesitate to call me  I look forward to following your patient along with you  Sincerely,        Jeet Roth DPM        CC: No Recipients  Dunia Yee  5/6/2022 11:20 AM  Signed  Assessment/Plan:         Diagnoses and all orders for this visit:    Bilateral plantar fasciitis  -     Ambulatory referral to Podiatry  -     triamcinolone acetonide (KENALOG-40) 40 mg/mL injection 40 mg  -     lidocaine (XYLOCAINE) 1 % injection 1 mL      Diagnosis and options discussed with patient  Patient agreeable to the plan as stated below    1  Discussed diagnosis and treatment options  2  Provided home therapy and stretching exercises  3  Stressed compliance with home/formal PT and arch supports  After answering all the patient's questions, I injected LEFT AND RIGHT heel with 0 5cc kenalog 40 and 1cc 1% lidocaine plain  Oral directions were given for rest and ice on the affected heel(s) and elevation  The ice is to be used for approximately 20 minutes at a time, 1-2 times a day for a few days  Home therapy instructions were demonstrated and a copy was given to the patient  The patient is to call at once if there is any increased pain, swelling, tenderness, redness, etc       Subjective:      Patient ID: Sheila Card is a 33 y o  female  PAtient presents with heel pain in both feet  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  She began getting heel pain  Pain starts in the bottom of the heel and radiates up the inside of the ankle    It does feel better on days off and with rest   She is otherwise healthy  The following portions of the patient's history were reviewed and updated as appropriate:   She  has a past medical history of Frequent headaches  She   Patient Active Problem List    Diagnosis Date Noted    Generalized abdominal pain 12/14/2021    Heartburn 12/14/2021    Vapes nicotine containing substance 01/14/2021    Chronic migraine without aura with status migrainosus, not intractable 10/30/2020    Chronic tension-type headache, not intractable 10/30/2020    Insomnia 10/30/2020    History of dental surgery 09/24/2020    Lyme disease 09/24/2020    Constipation 10/31/2019     She  has a past surgical history that includes [place] tooth extraction  Her family history includes No Known Problems in her father and mother  She  reports that she has quit smoking  Her smoking use included cigarettes  She smoked 0 00 packs per day  She has never used smokeless tobacco  She reports previous alcohol use  She reports previous drug use    Current Outpatient Medications   Medication Sig Dispense Refill    Diclofenac Sodium (VOLTAREN) 1 % Apply 2 g topically 4 (four) times a day 100 g 0    ibuprofen (MOTRIN) 600 mg tablet Take 1 tablet (600 mg total) by mouth every 8 (eight) hours as needed for mild pain or moderate pain 30 tablet 0    ketorolac (TORADOL) 10 mg tablet Take 1 tablet (10 mg total) by mouth every 8 (eight) hours as needed for moderate pain or severe pain (migraine cocktail - one tablet ketorolac + one tablet metoclopramide + one tablet benadryl) 20 tablet 0    lidocaine (XYLOCAINE) 2 % topical gel Apply topically as needed for mild pain 30 mL 0    linaCLOtide (Linzess) 290 MCG CAPS Take 1 capsule by mouth daily with breakfast 30 capsule 3    nortriptyline (PAMELOR) 50 mg capsule Take 1 capsule (50 mg total) by mouth daily at bedtime 90 capsule 2    omeprazole (PriLOSEC) 20 mg delayed release capsule TAKE 1 CAPSULE(20 MG) BY MOUTH DAILY 90 capsule 1    Rimegepant Sulfate (Nurtec) 75 MG TBDP Take 75 mg by mouth as needed (migraine) No more than one dose in 24 hours 8 tablet 0    benzonatate (TESSALON PERLES) 100 mg capsule Take 1 capsule (100 mg total) by mouth every 8 (eight) hours (Patient not taking: Reported on 12/21/2021 ) 21 capsule 0    Cholecalciferol (Vitamin D3) 50 MCG (2000 UT) TABS Take 2,000 Units by mouth daily (Patient not taking: Reported on 12/21/2021 )      cyproheptadine (PERIACTIN) 4 mg tablet Take 2 tablets at night (Patient not taking: Reported on 10/12/2021) 60 tablet 3    loratadine (CLARITIN) 10 mg tablet Take 1 tablet (10 mg total) by mouth daily (Patient not taking: Reported on 12/21/2021 ) 20 tablet 0    naproxen (Naprosyn) 500 mg tablet Take 1 tablet (500 mg total) by mouth 2 (two) times a day as needed for mild pain (Patient not taking: Reported on 12/21/2021 ) 30 tablet 1    pseudoephedrine (SUDAFED) 30 mg tablet Take 1 tablet (30 mg total) by mouth every 8 (eight) hours as needed for congestion (Patient not taking: Reported on 5/13/2021) 30 tablet 0     No current facility-administered medications for this visit       Current Outpatient Medications on File Prior to Visit   Medication Sig    Diclofenac Sodium (VOLTAREN) 1 % Apply 2 g topically 4 (four) times a day    ibuprofen (MOTRIN) 600 mg tablet Take 1 tablet (600 mg total) by mouth every 8 (eight) hours as needed for mild pain or moderate pain    ketorolac (TORADOL) 10 mg tablet Take 1 tablet (10 mg total) by mouth every 8 (eight) hours as needed for moderate pain or severe pain (migraine cocktail - one tablet ketorolac + one tablet metoclopramide + one tablet benadryl)    lidocaine (XYLOCAINE) 2 % topical gel Apply topically as needed for mild pain    linaCLOtide (Linzess) 290 MCG CAPS Take 1 capsule by mouth daily with breakfast    nortriptyline (PAMELOR) 50 mg capsule Take 1 capsule (50 mg total) by mouth daily at bedtime    omeprazole (PriLOSEC) 20 mg delayed release capsule TAKE 1 CAPSULE(20 MG) BY MOUTH DAILY    Rimegepant Sulfate (Nurtec) 75 MG TBDP Take 75 mg by mouth as needed (migraine) No more than one dose in 24 hours    benzonatate (TESSALON PERLES) 100 mg capsule Take 1 capsule (100 mg total) by mouth every 8 (eight) hours (Patient not taking: Reported on 12/21/2021 )    Cholecalciferol (Vitamin D3) 50 MCG (2000 UT) TABS Take 2,000 Units by mouth daily (Patient not taking: Reported on 12/21/2021 )    cyproheptadine (PERIACTIN) 4 mg tablet Take 2 tablets at night (Patient not taking: Reported on 10/12/2021)    loratadine (CLARITIN) 10 mg tablet Take 1 tablet (10 mg total) by mouth daily (Patient not taking: Reported on 12/21/2021 )    naproxen (Naprosyn) 500 mg tablet Take 1 tablet (500 mg total) by mouth 2 (two) times a day as needed for mild pain (Patient not taking: Reported on 12/21/2021 )    pseudoephedrine (SUDAFED) 30 mg tablet Take 1 tablet (30 mg total) by mouth every 8 (eight) hours as needed for congestion (Patient not taking: Reported on 5/13/2021)     No current facility-administered medications on file prior to visit  She is allergic to apple - food allergy       Review of Systems   Constitutional: Negative  Gastrointestinal: Negative for diarrhea, nausea and vomiting  Musculoskeletal: Positive for arthralgias and joint swelling  Skin: Negative for color change and wound  Neurological: Negative for weakness and numbness  Objective:      /71 (BP Location: Right arm, Patient Position: Sitting, Cuff Size: Adult)   Pulse 80   Ht 4' 11" (1 499 m) Comment: verbal  Wt 63 kg (139 lb)   LMP 04/20/2022   BMI 28 07 kg/m²          Physical Exam      Vitals reviewed    Constitutional: Patient is not distressed  Patient is well developed    Vascular: Dorsalis pedis and posterior tibial pulses 2/4  Capillary refill time within normal limits to all digits  No erythema  No edema  Dermatology: No rash, no open lesions   Present pedal hair     Musculoskeletal: Normal range of motion to subtalar joint, and midtarsal joint  Normal range of motion first MTPJ  Manual muscle testing 5 out of 5 for inversion/eversion/dorsiflexion/plantarflexion  Pain on medial band insertion of plantar fascia on the left and right foot    Plantar fascia is taut but intact in central arch  No fibroma palpated    Patient demonstrates moderate ankle equinus    On stance, patient shows moderate pes planus    Neurological exam: Monofilament sensation intact  Vibratory sensation intact   Achilles reflex is normal

## 2022-05-06 NOTE — PATIENT INSTRUCTIONS
Plantar Fasciitis Exercises   WHAT YOU NEED TO KNOW:   Plantar fasciitis exercises help stretch your plantar fascia, calf muscles, and Achilles tendon  They also help strengthen the muscles that support your heel and foot  Exercises and stretching can help prevent plantar fasciitis from getting worse or coming back  DISCHARGE INSTRUCTIONS:   Contact your healthcare provider if:   · Your pain and swelling increase  · You develop new knee, hip, or back pain  · You have questions or concerns about your condition or care  How to do plantar fasciitis exercises:  Ask your healthcare provider when to start these exercises and how often to do them  · Slant board stretch:  Stand on a slanted board with your toes higher than your heel  Press your heel into the board  Keep your knee slightly bent  Hold this position for 1 minute  Repeat 5 times  · Heel stretch:  Stand up straight with your hands on a wall  Place your injured leg slightly behind your other leg  Keep your heels flat on the floor, lean forward, and bend both knees  Hold for 30 seconds  · Calf stretch:  Stand up straight with your hands on a wall  Step forward so that your uninjured foot is in front of your injured foot  Keep your front leg bent and your back leg straight  Gently lean forward until you feel your calf stretch  Hold for 30 seconds and then relax  · Seated plantar fascia stretch:  Sit on a firm surface, such as the floor or a mat  Extend your legs out in front of you  Raise your injured foot a few inches off the ground  Keep your leg straight  Grab the toes of your injured foot and pull them toward you  With your other hand, feel your plantar fascia  You should feel it press outward  Hold for 30 seconds  If you cannot reach your toes, loop a towel or tie around your foot  Gently pull on the towel or tie and flex your toes toward you  · Heel raises:  Stand on the injured leg   Raise your other leg off the ground  Hold onto a railing or wall for balance  Slowly rise up on the toes of your injured leg  Hold for 5 seconds  Slowly lower your heel to the ground  · Toe curls:  Place a towel on the floor  Put your foot flat on the towel  Grab the towel with your toes by curling them around the towel  Lift the towel up with your toes  · Toe taps:  Sit down and place your foot flat on the floor  Keep your heel on the floor  Point all your toes up toward the ceiling  While the 4 smaller toes are pointed up, bend your big toe down and tap it on the ground  Do 10 to 50 taps  Point all 5 toes up toward the ceiling again  This time keep your big toe pointed up and tap the 4 smaller toes on the ground  Do 10 to 50 taps each time  Follow up with your doctor as directed:  Write down your questions so you remember to ask them during your visits  © Copyright [employer] 2022 Information is for End User's use only and may not be sold, redistributed or otherwise used for commercial purposes  All illustrations and images included in CareNotes® are the copyrighted property of [employer]  or [facility] Nikko Murrell   The above information is an  only  It is not intended as medical advice for individual conditions or treatments  Talk to your doctor, nurse or pharmacist before following any medical regimen to see if it is safe and effective for you

## 2022-05-06 NOTE — PROGRESS NOTES
Assessment/Plan:         Diagnoses and all orders for this visit:    Bilateral plantar fasciitis  -     Ambulatory referral to Podiatry  -     triamcinolone acetonide (KENALOG-40) 40 mg/mL injection 40 mg  -     lidocaine (XYLOCAINE) 1 % injection 1 mL      Diagnosis and options discussed with patient  Patient agreeable to the plan as stated below    1  Discussed diagnosis and treatment options  2  Provided home therapy and stretching exercises  3  Stressed compliance with home/formal PT and arch supports  After answering all the patient's questions, I injected LEFT AND RIGHT heel with 0 5cc kenalog 40 and 1cc 1% lidocaine plain  Oral directions were given for rest and ice on the affected heel(s) and elevation  The ice is to be used for approximately 20 minutes at a time, 1-2 times a day for a few days  Home therapy instructions were demonstrated and a copy was given to the patient  The patient is to call at once if there is any increased pain, swelling, tenderness, redness, etc       Subjective:      Patient ID: Barb Mayo is a 33 y o  female  PAtient presents with heel pain in both feet  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  She began getting heel pain  Pain starts in the bottom of the heel and radiates up the inside of the ankle  It does feel better on days off and with rest   She is otherwise healthy  The following portions of the patient's history were reviewed and updated as appropriate:   She  has a past medical history of Frequent headaches    She   Patient Active Problem List    Diagnosis Date Noted    Generalized abdominal pain 12/14/2021    Heartburn 12/14/2021    Vapes nicotine containing substance 01/14/2021    Chronic migraine without aura with status migrainosus, not intractable 10/30/2020    Chronic tension-type headache, not intractable 10/30/2020    Insomnia 10/30/2020    History of dental surgery 09/24/2020    Lyme disease 09/24/2020    Constipation 10/31/2019     She  has a past surgical history that includes [place] tooth extraction  Her family history includes No Known Problems in her father and mother  She  reports that she has quit smoking  Her smoking use included cigarettes  She smoked 0 00 packs per day  She has never used smokeless tobacco  She reports previous alcohol use  She reports previous drug use    Current Outpatient Medications   Medication Sig Dispense Refill    Diclofenac Sodium (VOLTAREN) 1 % Apply 2 g topically 4 (four) times a day 100 g 0    ibuprofen (MOTRIN) 600 mg tablet Take 1 tablet (600 mg total) by mouth every 8 (eight) hours as needed for mild pain or moderate pain 30 tablet 0    ketorolac (TORADOL) 10 mg tablet Take 1 tablet (10 mg total) by mouth every 8 (eight) hours as needed for moderate pain or severe pain (migraine cocktail - one tablet ketorolac + one tablet metoclopramide + one tablet benadryl) 20 tablet 0    lidocaine (XYLOCAINE) 2 % topical gel Apply topically as needed for mild pain 30 mL 0    linaCLOtide (Linzess) 290 MCG CAPS Take 1 capsule by mouth daily with breakfast 30 capsule 3    nortriptyline (PAMELOR) 50 mg capsule Take 1 capsule (50 mg total) by mouth daily at bedtime 90 capsule 2    omeprazole (PriLOSEC) 20 mg delayed release capsule TAKE 1 CAPSULE(20 MG) BY MOUTH DAILY 90 capsule 1    Rimegepant Sulfate (Nurtec) 75 MG TBDP Take 75 mg by mouth as needed (migraine) No more than one dose in 24 hours 8 tablet 0    benzonatate (TESSALON PERLES) 100 mg capsule Take 1 capsule (100 mg total) by mouth every 8 (eight) hours (Patient not taking: Reported on 12/21/2021 ) 21 capsule 0    Cholecalciferol (Vitamin D3) 50 MCG (2000 UT) TABS Take 2,000 Units by mouth daily (Patient not taking: Reported on 12/21/2021 )      cyproheptadine (PERIACTIN) 4 mg tablet Take 2 tablets at night (Patient not taking: Reported on 10/12/2021) 60 tablet 3    loratadine (CLARITIN) 10 mg tablet Take 1 tablet (10 mg total) by mouth daily (Patient not taking: Reported on 12/21/2021 ) 20 tablet 0    naproxen (Naprosyn) 500 mg tablet Take 1 tablet (500 mg total) by mouth 2 (two) times a day as needed for mild pain (Patient not taking: Reported on 12/21/2021 ) 30 tablet 1    pseudoephedrine (SUDAFED) 30 mg tablet Take 1 tablet (30 mg total) by mouth every 8 (eight) hours as needed for congestion (Patient not taking: Reported on 5/13/2021) 30 tablet 0     No current facility-administered medications for this visit       Current Outpatient Medications on File Prior to Visit   Medication Sig    Diclofenac Sodium (VOLTAREN) 1 % Apply 2 g topically 4 (four) times a day    ibuprofen (MOTRIN) 600 mg tablet Take 1 tablet (600 mg total) by mouth every 8 (eight) hours as needed for mild pain or moderate pain    ketorolac (TORADOL) 10 mg tablet Take 1 tablet (10 mg total) by mouth every 8 (eight) hours as needed for moderate pain or severe pain (migraine cocktail - one tablet ketorolac + one tablet metoclopramide + one tablet benadryl)    lidocaine (XYLOCAINE) 2 % topical gel Apply topically as needed for mild pain    linaCLOtide (Linzess) 290 MCG CAPS Take 1 capsule by mouth daily with breakfast    nortriptyline (PAMELOR) 50 mg capsule Take 1 capsule (50 mg total) by mouth daily at bedtime    omeprazole (PriLOSEC) 20 mg delayed release capsule TAKE 1 CAPSULE(20 MG) BY MOUTH DAILY    Rimegepant Sulfate (Nurtec) 75 MG TBDP Take 75 mg by mouth as needed (migraine) No more than one dose in 24 hours    benzonatate (TESSALON PERLES) 100 mg capsule Take 1 capsule (100 mg total) by mouth every 8 (eight) hours (Patient not taking: Reported on 12/21/2021 )    Cholecalciferol (Vitamin D3) 50 MCG (2000 UT) TABS Take 2,000 Units by mouth daily (Patient not taking: Reported on 12/21/2021 )    cyproheptadine (PERIACTIN) 4 mg tablet Take 2 tablets at night (Patient not taking: Reported on 10/12/2021)    loratadine (CLARITIN) 10 mg tablet Take 1 tablet (10 mg total) by mouth daily (Patient not taking: Reported on 12/21/2021 )    naproxen (Naprosyn) 500 mg tablet Take 1 tablet (500 mg total) by mouth 2 (two) times a day as needed for mild pain (Patient not taking: Reported on 12/21/2021 )    pseudoephedrine (SUDAFED) 30 mg tablet Take 1 tablet (30 mg total) by mouth every 8 (eight) hours as needed for congestion (Patient not taking: Reported on 5/13/2021)     No current facility-administered medications on file prior to visit  She is allergic to apple - food allergy       Review of Systems   Constitutional: Negative  Gastrointestinal: Negative for diarrhea, nausea and vomiting  Musculoskeletal: Positive for arthralgias and joint swelling  Skin: Negative for color change and wound  Neurological: Negative for weakness and numbness  Objective:      /71 (BP Location: Right arm, Patient Position: Sitting, Cuff Size: Adult)   Pulse 80   Ht 4' 11" (1 499 m) Comment: verbal  Wt 63 kg (139 lb)   LMP 04/20/2022   BMI 28 07 kg/m²          Physical Exam      Vitals reviewed    Constitutional: Patient is not distressed  Patient is well developed    Vascular: Dorsalis pedis and posterior tibial pulses 2/4  Capillary refill time within normal limits to all digits  No erythema  No edema  Dermatology: No rash, no open lesions  Present pedal hair  Musculoskeletal: Normal range of motion to subtalar joint, and midtarsal joint  Normal range of motion first MTPJ  Manual muscle testing 5 out of 5 for inversion/eversion/dorsiflexion/plantarflexion  Pain on medial band insertion of plantar fascia on the left and right foot    Plantar fascia is taut but intact in central arch  No fibroma palpated    Patient demonstrates moderate ankle equinus    On stance, patient shows moderate pes planus    Neurological exam: Monofilament sensation intact  Vibratory sensation intact   Achilles reflex is normal

## 2022-05-11 ENCOUNTER — TELEPHONE (OUTPATIENT)
Dept: NEUROLOGY | Facility: CLINIC | Age: 32
End: 2022-05-11

## 2022-05-11 NOTE — TELEPHONE ENCOUNTER
Called and left a voicemail for patient - Please call back to confirm upcoming appointment with [facility]  Provided patient with apt date, time and location  Informed patient that check in is at least 15 minutes prior to apt time

## 2022-05-19 DIAGNOSIS — G43.701 CHRONIC MIGRAINE WITHOUT AURA WITH STATUS MIGRAINOSUS, NOT INTRACTABLE: ICD-10-CM

## 2022-05-23 RX ORDER — KETOROLAC TROMETHAMINE 10 MG/1
10 TABLET, FILM COATED ORAL EVERY 8 HOURS PRN
Qty: 20 TABLET | Refills: 0 | Status: SHIPPED | OUTPATIENT
Start: 2022-05-23

## 2022-05-23 RX ORDER — RIMEGEPANT SULFATE 75 MG/75MG
75 TABLET, ORALLY DISINTEGRATING ORAL AS NEEDED
Qty: 8 TABLET | Refills: 0 | Status: SHIPPED | OUTPATIENT
Start: 2022-05-23

## 2022-05-23 NOTE — TELEPHONE ENCOUNTER
Please review and sign if in agreement  Ketorolac and rimegepant sulfate last written on 4/12/2022 for a 30 day supply with no refills      LOV 8/17/21 with next scheduled appt 7/26/22

## 2022-05-27 ENCOUNTER — APPOINTMENT (OUTPATIENT)
Dept: LAB | Facility: CLINIC | Age: 32
End: 2022-05-27
Payer: COMMERCIAL

## 2022-05-27 ENCOUNTER — OFFICE VISIT (OUTPATIENT)
Dept: FAMILY MEDICINE CLINIC | Facility: CLINIC | Age: 32
End: 2022-05-27

## 2022-05-27 VITALS
HEART RATE: 80 BPM | DIASTOLIC BLOOD PRESSURE: 78 MMHG | WEIGHT: 138.6 LBS | RESPIRATION RATE: 14 BRPM | OXYGEN SATURATION: 98 % | TEMPERATURE: 97.3 F | BODY MASS INDEX: 27.94 KG/M2 | HEIGHT: 59 IN | SYSTOLIC BLOOD PRESSURE: 102 MMHG

## 2022-05-27 DIAGNOSIS — M25.50 MULTIPLE JOINT PAIN: Primary | ICD-10-CM

## 2022-05-27 DIAGNOSIS — A69.20 LYME DISEASE: ICD-10-CM

## 2022-05-27 DIAGNOSIS — M25.50 MULTIPLE JOINT PAIN: ICD-10-CM

## 2022-05-27 DIAGNOSIS — G43.701 CHRONIC MIGRAINE WITHOUT AURA WITH STATUS MIGRAINOSUS, NOT INTRACTABLE: ICD-10-CM

## 2022-05-27 LAB
25(OH)D3 SERPL-MCNC: 24.6 NG/ML (ref 30–100)
ALBUMIN SERPL BCP-MCNC: 3.9 G/DL (ref 3.5–5)
ALP SERPL-CCNC: 60 U/L (ref 46–116)
ALT SERPL W P-5'-P-CCNC: 33 U/L (ref 12–78)
ANION GAP SERPL CALCULATED.3IONS-SCNC: 3 MMOL/L (ref 4–13)
AST SERPL W P-5'-P-CCNC: 19 U/L (ref 5–45)
BASOPHILS # BLD AUTO: 0.03 THOUSANDS/ΜL (ref 0–0.1)
BASOPHILS NFR BLD AUTO: 0 % (ref 0–1)
BILIRUB SERPL-MCNC: 0.36 MG/DL (ref 0.2–1)
BUN SERPL-MCNC: 11 MG/DL (ref 5–25)
CALCIUM SERPL-MCNC: 9.9 MG/DL (ref 8.3–10.1)
CHLORIDE SERPL-SCNC: 107 MMOL/L (ref 100–108)
CO2 SERPL-SCNC: 29 MMOL/L (ref 21–32)
CREAT SERPL-MCNC: 0.88 MG/DL (ref 0.6–1.3)
CRP SERPL QL: 3.3 MG/L
EOSINOPHIL # BLD AUTO: 0.06 THOUSAND/ΜL (ref 0–0.61)
EOSINOPHIL NFR BLD AUTO: 1 % (ref 0–6)
ERYTHROCYTE [DISTWIDTH] IN BLOOD BY AUTOMATED COUNT: 12.5 % (ref 11.6–15.1)
GFR SERPL CREATININE-BSD FRML MDRD: 87 ML/MIN/1.73SQ M
GLUCOSE P FAST SERPL-MCNC: 99 MG/DL (ref 65–99)
HCT VFR BLD AUTO: 42.9 % (ref 34.8–46.1)
HGB BLD-MCNC: 13.9 G/DL (ref 11.5–15.4)
IMM GRANULOCYTES # BLD AUTO: 0.03 THOUSAND/UL (ref 0–0.2)
IMM GRANULOCYTES NFR BLD AUTO: 0 % (ref 0–2)
LYMPHOCYTES # BLD AUTO: 1.7 THOUSANDS/ΜL (ref 0.6–4.47)
LYMPHOCYTES NFR BLD AUTO: 22 % (ref 14–44)
MCH RBC QN AUTO: 29 PG (ref 26.8–34.3)
MCHC RBC AUTO-ENTMCNC: 32.4 G/DL (ref 31.4–37.4)
MCV RBC AUTO: 90 FL (ref 82–98)
MONOCYTES # BLD AUTO: 0.67 THOUSAND/ΜL (ref 0.17–1.22)
MONOCYTES NFR BLD AUTO: 9 % (ref 4–12)
NEUTROPHILS # BLD AUTO: 5.38 THOUSANDS/ΜL (ref 1.85–7.62)
NEUTS SEG NFR BLD AUTO: 68 % (ref 43–75)
NRBC BLD AUTO-RTO: 0 /100 WBCS
PLATELET # BLD AUTO: 218 THOUSANDS/UL (ref 149–390)
PMV BLD AUTO: 11.4 FL (ref 8.9–12.7)
POTASSIUM SERPL-SCNC: 4.5 MMOL/L (ref 3.5–5.3)
PROT SERPL-MCNC: 7.5 G/DL (ref 6.4–8.2)
RBC # BLD AUTO: 4.79 MILLION/UL (ref 3.81–5.12)
SODIUM SERPL-SCNC: 139 MMOL/L (ref 136–145)
TSH SERPL DL<=0.05 MIU/L-ACNC: 1.77 UIU/ML (ref 0.45–4.5)
WBC # BLD AUTO: 7.87 THOUSAND/UL (ref 4.31–10.16)

## 2022-05-27 PROCEDURE — 86200 CCP ANTIBODY: CPT

## 2022-05-27 PROCEDURE — 86430 RHEUMATOID FACTOR TEST QUAL: CPT

## 2022-05-27 PROCEDURE — 85732 THROMBOPLASTIN TIME PARTIAL: CPT

## 2022-05-27 PROCEDURE — 99214 OFFICE O/P EST MOD 30 MIN: CPT | Performed by: NURSE PRACTITIONER

## 2022-05-27 PROCEDURE — 86617 LYME DISEASE ANTIBODY: CPT

## 2022-05-27 PROCEDURE — 86618 LYME DISEASE ANTIBODY: CPT

## 2022-05-27 PROCEDURE — 85025 COMPLETE CBC W/AUTO DIFF WBC: CPT

## 2022-05-27 PROCEDURE — 85613 RUSSELL VIPER VENOM DILUTED: CPT

## 2022-05-27 PROCEDURE — 36415 COLL VENOUS BLD VENIPUNCTURE: CPT

## 2022-05-27 PROCEDURE — 86235 NUCLEAR ANTIGEN ANTIBODY: CPT

## 2022-05-27 PROCEDURE — 85705 THROMBOPLASTIN INHIBITION: CPT

## 2022-05-27 PROCEDURE — 84443 ASSAY THYROID STIM HORMONE: CPT

## 2022-05-27 PROCEDURE — 86140 C-REACTIVE PROTEIN: CPT

## 2022-05-27 PROCEDURE — 85670 THROMBIN TIME PLASMA: CPT

## 2022-05-27 PROCEDURE — 86038 ANTINUCLEAR ANTIBODIES: CPT

## 2022-05-27 PROCEDURE — 82306 VITAMIN D 25 HYDROXY: CPT

## 2022-05-27 PROCEDURE — 80053 COMPREHEN METABOLIC PANEL: CPT

## 2022-05-27 RX ORDER — NORTRIPTYLINE HYDROCHLORIDE 75 MG/1
75 CAPSULE ORAL
Qty: 90 CAPSULE | Refills: 0 | Status: SHIPPED | OUTPATIENT
Start: 2022-05-27 | End: 2022-06-20

## 2022-05-27 RX ORDER — METHYLPREDNISOLONE 4 MG/1
TABLET ORAL
Qty: 21 EACH | Refills: 0 | Status: SHIPPED | OUTPATIENT
Start: 2022-05-27 | End: 2022-08-02 | Stop reason: ALTCHOICE

## 2022-05-27 NOTE — PROGRESS NOTES
Assessment/Plan:    Renetta Gaytan was seen today for lyme disease and joint pain  Diagnoses and all orders for this visit:    Multiple joint pain  -     CBC and differential; Future  -     Comprehensive metabolic panel; Future  -     TSH, 3rd generation with Free T4 reflex; Future  -     Cyclic citrul peptide antibody, IgG; Future  -     RF Screen w/ Reflex to Titer; Future  -     Vitamin D 25 hydroxy; Future  -     Antinuclear Antibodies (LENNY), IFA; Future  -     Sjogren's Antibodies; Future  -     Lupus anticoagulant; Future  -     nortriptyline (PAMELOR) 75 MG capsule; Take 1 capsule (75 mg total) by mouth daily at bedtime  -     methylPREDNISolone 4 MG tablet therapy pack; Use as directed on package  -     C-reactive protein; Future    Chronic migraine without aura with status migrainosus, not intractable  -     nortriptyline (PAMELOR) 75 MG capsule; Take 1 capsule (75 mg total) by mouth daily at bedtime    Lyme disease  -     Sjogren's Antibodies; Future  -     Lupus anticoagulant; Future  -     Lyme Antibody Profile with reflex to WB; Future  -     C-reactive protein; Future          Return in about 4 weeks (around 6/24/2022) for polyarthralgia   Subjective:     Himanshu Curry is a 33 y o  female who  has a past medical history of Frequent headaches and Lyme disease  who presented to the office today for follow up      -patient concerned about polyarthralgia: ongoing x 6 months, thinks it is due to having Lyme disease in 2019, although treated at that time  Pain is to back, shoulders, knees, ankles, feet and is sharp in nature   Has tried OTC analgesics which have not been effective     The following portions of the patient's history were reviewed and updated as appropriate: allergies, current medications, past family history, past medical history, past social history, past surgical history and problem list     Current Outpatient Medications on File Prior to Visit   Medication Sig Dispense Refill  Cholecalciferol (Vitamin D3) 50 MCG (2000 UT) TABS Take 2,000 Units by mouth daily (Patient not taking: Reported on 12/21/2021 )      cyproheptadine (PERIACTIN) 4 mg tablet Take 2 tablets at night (Patient not taking: Reported on 10/12/2021) 60 tablet 3    Diclofenac Sodium (VOLTAREN) 1 % Apply 2 g topically 4 (four) times a day 100 g 0    ibuprofen (MOTRIN) 600 mg tablet Take 1 tablet (600 mg total) by mouth every 8 (eight) hours as needed for mild pain or moderate pain 30 tablet 0    ketorolac (TORADOL) 10 mg tablet Take 1 tablet (10 mg total) by mouth every 8 (eight) hours as needed for moderate pain or severe pain (migraine cocktail - one tablet ketorolac + one tablet metoclopramide + one tablet benadryl) 20 tablet 0    lidocaine (XYLOCAINE) 2 % topical gel Apply topically as needed for mild pain 30 mL 0    linaCLOtide (Linzess) 290 MCG CAPS Take 1 capsule by mouth daily with breakfast 30 capsule 0    omeprazole (PriLOSEC) 20 mg delayed release capsule Take 1 capsule (20 mg total) by mouth in the morning   90 capsule 0    Rimegepant Sulfate (Nurtec) 75 MG TBDP Take 75 mg by mouth as needed (migraine) No more than one dose in 24 hours 8 tablet 0    [DISCONTINUED] benzonatate (TESSALON PERLES) 100 mg capsule Take 1 capsule (100 mg total) by mouth every 8 (eight) hours (Patient not taking: Reported on 12/21/2021 ) 21 capsule 0    [DISCONTINUED] loratadine (CLARITIN) 10 mg tablet Take 1 tablet (10 mg total) by mouth daily (Patient not taking: Reported on 12/21/2021 ) 20 tablet 0    [DISCONTINUED] naproxen (Naprosyn) 500 mg tablet Take 1 tablet (500 mg total) by mouth 2 (two) times a day as needed for mild pain (Patient not taking: Reported on 12/21/2021 ) 30 tablet 1    [DISCONTINUED] nortriptyline (PAMELOR) 50 mg capsule Take 1 capsule (50 mg total) by mouth daily at bedtime 90 capsule 2    [DISCONTINUED] pseudoephedrine (SUDAFED) 30 mg tablet Take 1 tablet (30 mg total) by mouth every 8 (eight) hours as needed for congestion (Patient not taking: Reported on 5/13/2021) 30 tablet 0     No current facility-administered medications on file prior to visit  Review of Systems   Constitutional: Negative for chills and fever  HENT: Negative for ear pain and sore throat  Eyes: Negative for pain and visual disturbance  Respiratory: Negative for cough and shortness of breath  Cardiovascular: Negative for chest pain and palpitations  Gastrointestinal: Negative for abdominal pain and vomiting  Genitourinary: Negative for dysuria and hematuria  Musculoskeletal: Positive for arthralgias, back pain and joint swelling  Skin: Negative for color change and rash  Neurological: Negative for seizures and syncope  All other systems reviewed and are negative  BMI Counseling: Body mass index is 27 99 kg/m²  The BMI is above normal  Nutrition recommendations include consuming healthier snacks  Rationale for BMI follow-up plan is due to patient being overweight or obese  Depression Screening and Follow-up Plan: Patient was screened for depression during today's encounter  They screened negative with a PHQ-2 score of 0  Objective:    /78 (BP Location: Left arm, Patient Position: Sitting, Cuff Size: Adult)   Pulse 80   Temp (!) 97 3 °F (36 3 °C) (Temporal)   Resp 14   Ht 4' 11" (1 499 m)   Wt 62 9 kg (138 lb 9 6 oz)   LMP 05/19/2022 (Exact Date)   SpO2 98%   BMI 27 99 kg/m²     Physical Exam  Vitals and nursing note reviewed  Constitutional:       General: She is not in acute distress  Appearance: She is well-developed  She is not diaphoretic  HENT:      Head: Normocephalic and atraumatic  Eyes:      Pupils: Pupils are equal, round, and reactive to light  Cardiovascular:      Rate and Rhythm: Normal rate and regular rhythm  Pulmonary:      Effort: Pulmonary effort is normal  No respiratory distress  Breath sounds: Normal breath sounds  No wheezing  Abdominal:      General: Bowel sounds are normal  There is no distension  Palpations: Abdomen is soft  Tenderness: There is no abdominal tenderness  Musculoskeletal:         General: No deformity  Normal range of motion  Right shoulder: Tenderness present  Normal range of motion  Normal strength  Left shoulder: Tenderness present  Normal range of motion  Normal strength  Cervical back: Normal range of motion and neck supple  Lumbar back: Tenderness present  Normal range of motion  Negative right straight leg raise test and negative left straight leg raise test       Right knee: Normal range of motion  Tenderness present  Left knee: Normal range of motion  Tenderness present  Right foot: Tenderness present  Left foot: Tenderness present  Lymphadenopathy:      Cervical: No cervical adenopathy  Skin:     General: Skin is warm and dry  Capillary Refill: Capillary refill takes less than 2 seconds  Findings: No rash  Neurological:      Mental Status: She is alert and oriented to person, place, and time     Psychiatric:         Behavior: Behavior normal          LEIGHANN Sanchez  05/27/22  2:57 PM

## 2022-05-28 LAB
B BURGDOR IGG+IGM SER-ACNC: 254
RHEUMATOID FACT SER QL LA: NEGATIVE

## 2022-05-31 LAB
ANA TITR SER IF: NEGATIVE {TITER}
APTT SCREEN TO CONFIRM RATIO: 1.38 RATIO (ref 0–1.34)
B BURGDOR IGG PATRN SER IB-IMP: POSITIVE
B BURGDOR IGM PATRN SER IB-IMP: NEGATIVE
B BURGDOR18KD IGG SER QL IB: PRESENT
B BURGDOR23KD IGG SER QL IB: ABNORMAL
B BURGDOR23KD IGM SER QL IB: PRESENT
B BURGDOR28KD IGG SER QL IB: PRESENT
B BURGDOR30KD IGG SER QL IB: PRESENT
B BURGDOR39KD IGG SER QL IB: PRESENT
B BURGDOR39KD IGM SER QL IB: ABNORMAL
B BURGDOR41KD IGG SER QL IB: PRESENT
B BURGDOR41KD IGM SER QL IB: ABNORMAL
B BURGDOR45KD IGG SER QL IB: ABNORMAL
B BURGDOR58KD IGG SER QL IB: PRESENT
B BURGDOR66KD IGG SER QL IB: PRESENT
B BURGDOR93KD IGG SER QL IB: PRESENT
CCP AB SER IA-ACNC: 1.2
CONFIRM APTT/NORMAL: 26.4 SEC (ref 0–47.6)
ENA SS-A AB SER-ACNC: <0.2 AI (ref 0–0.9)
ENA SS-B AB SER-ACNC: <0.2 AI (ref 0–0.9)
LA PPP-IMP: ABNORMAL
SCREEN APTT: 26.9 SEC (ref 0–51.9)
SCREEN DRVVT: 46.1 SEC (ref 0–47)
THROMBIN TIME: 16.2 SEC (ref 0–23)

## 2022-06-03 ENCOUNTER — OFFICE VISIT (OUTPATIENT)
Dept: PODIATRY | Facility: CLINIC | Age: 32
End: 2022-06-03
Payer: COMMERCIAL

## 2022-06-03 VITALS
DIASTOLIC BLOOD PRESSURE: 72 MMHG | SYSTOLIC BLOOD PRESSURE: 111 MMHG | HEIGHT: 59 IN | HEART RATE: 73 BPM | WEIGHT: 138 LBS | BODY MASS INDEX: 27.82 KG/M2

## 2022-06-03 DIAGNOSIS — M72.2 BILATERAL PLANTAR FASCIITIS: Primary | ICD-10-CM

## 2022-06-03 PROCEDURE — 20550 NJX 1 TENDON SHEATH/LIGAMENT: CPT | Performed by: PODIATRIST

## 2022-06-03 RX ORDER — LIDOCAINE HYDROCHLORIDE 10 MG/ML
1 INJECTION, SOLUTION INFILTRATION; PERINEURAL ONCE
Status: COMPLETED | OUTPATIENT
Start: 2022-06-03 | End: 2022-06-03

## 2022-06-03 RX ORDER — TRIAMCINOLONE ACETONIDE 40 MG/ML
40 INJECTION, SUSPENSION INTRA-ARTICULAR; INTRAMUSCULAR ONCE
Status: COMPLETED | OUTPATIENT
Start: 2022-06-03 | End: 2022-06-03

## 2022-06-03 RX ADMIN — TRIAMCINOLONE ACETONIDE 40 MG: 40 INJECTION, SUSPENSION INTRA-ARTICULAR; INTRAMUSCULAR at 11:45

## 2022-06-03 RX ADMIN — LIDOCAINE HYDROCHLORIDE 1 ML: 10 INJECTION, SOLUTION INFILTRATION; PERINEURAL at 11:44

## 2022-06-03 NOTE — PROGRESS NOTES
Assessment/Plan:      Diagnoses and all orders for this visit:    Bilateral plantar fasciitis  -     triamcinolone acetonide (KENALOG-40) 40 mg/mL injection 40 mg  -     lidocaine (XYLOCAINE) 1 % injection 1 mL      After answering all the patient's questions, I injected LEFT and RIGHT heel with 0 5cc kenalog 40 and 1cc 1% lidocaine plain  Oral directions were given for rest and ice on the affected heel(s) and elevation  The ice is to be used for approximately 20 minutes at a time, 1-2 times a day for a few days  Home therapy instructions were demonstrated and a copy was given to the patient  The patient is to call at once if there is any increased pain, swelling, tenderness, redness, etc       Subjective:     Patient ID: Elena Arizmendi is a 33 y o  female  Patient was diagnosed with B/L plantar fascitis  She has been doing ice and home PT  She got a steroid shot in each heel last month  Review of Systems      Objective:     Physical Exam      50% improved in both heels  Moderate tenderness medial band plantar fascial insertion bilaterally  This is improved from last month  Palpable pedal pulses  No fibroma palpated  Normal ankle strength Achilles function

## 2022-06-06 ENCOUNTER — APPOINTMENT (OUTPATIENT)
Dept: RADIOLOGY | Facility: MEDICAL CENTER | Age: 32
End: 2022-06-06
Payer: COMMERCIAL

## 2022-06-06 ENCOUNTER — OFFICE VISIT (OUTPATIENT)
Dept: OBGYN CLINIC | Facility: MEDICAL CENTER | Age: 32
End: 2022-06-06
Payer: COMMERCIAL

## 2022-06-06 VITALS
WEIGHT: 140.8 LBS | HEIGHT: 59 IN | DIASTOLIC BLOOD PRESSURE: 71 MMHG | HEART RATE: 71 BPM | SYSTOLIC BLOOD PRESSURE: 105 MMHG | BODY MASS INDEX: 28.39 KG/M2

## 2022-06-06 DIAGNOSIS — M25.562 PAIN IN BOTH KNEES, UNSPECIFIED CHRONICITY: ICD-10-CM

## 2022-06-06 DIAGNOSIS — M25.561 PAIN IN BOTH KNEES, UNSPECIFIED CHRONICITY: ICD-10-CM

## 2022-06-06 DIAGNOSIS — M22.2X9 DISORDER OF PATELLOFEMORAL JOINT, UNSPECIFIED LATERALITY: ICD-10-CM

## 2022-06-06 DIAGNOSIS — M25.562 CHRONIC PAIN OF BOTH KNEES: Primary | ICD-10-CM

## 2022-06-06 DIAGNOSIS — G89.29 CHRONIC PAIN OF BOTH KNEES: Primary | ICD-10-CM

## 2022-06-06 DIAGNOSIS — M25.561 CHRONIC PAIN OF BOTH KNEES: Primary | ICD-10-CM

## 2022-06-06 DIAGNOSIS — A69.20 LYME DISEASE: ICD-10-CM

## 2022-06-06 PROCEDURE — 99203 OFFICE O/P NEW LOW 30 MIN: CPT | Performed by: EMERGENCY MEDICINE

## 2022-06-06 PROCEDURE — 73564 X-RAY EXAM KNEE 4 OR MORE: CPT

## 2022-06-06 NOTE — PROGRESS NOTES
Assessment/Plan:    Diagnoses and all orders for this visit:    Chronic pain of both knees  -     XR knee 4+ vw right injury; Future  -     XR knee 4+ vw left injury; Future  -     Ambulatory Referral to Physical Therapy; Future    Lyme disease  -     Ambulatory Referral to Physical Therapy; Future    Disorder of patellofemoral joint, unspecified laterality  -     Ambulatory Referral to Physical Therapy; Future    Chronic bilateral knee pain possibly due to patellofemoral recommend physical therapy and over-the-counter knee sleeves  However she was previously diagnosed and treated for Lyme disease high and recently tested positive with a Western blot for IgG  She is to follow-up with Rheumatology  Return in about 6 weeks (around 7/18/2022)  Chief Complaint:     Chief Complaint   Patient presents with    Left Foot - Pain    Right Foot - Pain       Subjective:   Patient ID: Abdirahman Gee is a 33 y o  female  NP presents for b/l foot pain with radiation of pain up legs to knees  She also notes chronic bilateral anterior knee pain however states this pain is different  She has been treating with Podiatry for plantar fasciitis and has received injections  She does note chronic diffuse aches and pains  Dx with Lyme around March 2020 was treated with Lyme      Review of Systems    The following portions of the patient's chart were reviewed and updated as appropriate:    Allergy:    Allergies   Allergen Reactions    Apple - Food Allergy Anaphylaxis         Past Medical History:   Diagnosis Date    Frequent headaches     Lyme disease        Past Surgical History:   Procedure Laterality Date    WISDOM TOOTH EXTRACTION         Social History     Socioeconomic History    Marital status: Single     Spouse name: Not on file    Number of children: Not on file    Years of education: Not on file    Highest education level: Not on file   Occupational History    Not on file   Tobacco Use    Smoking status: Former Smoker     Packs/day: 0 00     Types: Cigarettes    Smokeless tobacco: Never Used    Tobacco comment: Quit Oct 2021   Vaping Use    Vaping Use: Never used   Substance and Sexual Activity    Alcohol use: Not Currently    Drug use: Not Currently    Sexual activity: Yes   Other Topics Concern    Not on file   Social History Narrative    ** Merged History Encounter **         Flu shot:no     Social Determinants of Health     Financial Resource Strain: Low Risk     Difficulty of Paying Living Expenses: Not hard at all   Food Insecurity: No Food Insecurity    Worried About [address] [employer] in the Last Year: Never true    [place] [employer] in the Last Year: Never true   Transportation Needs: No Transportation Needs    Lack of Transportation (Medical): No    Lack of Transportation (Non-Medical):  No   Physical Activity: Not on file   Stress: Not on file   Social Connections: Not on file   Intimate Partner Violence: Not on file   Housing Stability: Low Risk     Unable to Pay for Housing in the Last Year: No    Number of Places Lived in the Last Year: 1    Unstable Housing in the Last Year: No       Family History   Problem Relation Age of Onset    No Known Problems Mother     No Known Problems Father        Medications:    Current Outpatient Medications:     Diclofenac Sodium (VOLTAREN) 1 %, Apply 2 g topically 4 (four) times a day, Disp: 100 g, Rfl: 0    ergocalciferol (VITAMIN D2) 50,000 units, Take 1 capsule (50,000 Units total) by mouth once a week, Disp: 16 capsule, Rfl: 0    ibuprofen (MOTRIN) 600 mg tablet, Take 1 tablet (600 mg total) by mouth every 8 (eight) hours as needed for mild pain or moderate pain, Disp: 30 tablet, Rfl: 0    ketorolac (TORADOL) 10 mg tablet, Take 1 tablet (10 mg total) by mouth every 8 (eight) hours as needed for moderate pain or severe pain (migraine cocktail - one tablet ketorolac + one tablet metoclopramide + one tablet benadryl), Disp: 20 tablet, Rfl: 0    lidocaine (XYLOCAINE) 2 % topical gel, Apply topically as needed for mild pain, Disp: 30 mL, Rfl: 0    linaCLOtide (Linzess) 290 MCG CAPS, Take 1 capsule by mouth daily with breakfast, Disp: 30 capsule, Rfl: 0    methylPREDNISolone 4 MG tablet therapy pack, Use as directed on package, Disp: 21 each, Rfl: 0    nortriptyline (PAMELOR) 75 MG capsule, Take 1 capsule (75 mg total) by mouth daily at bedtime, Disp: 90 capsule, Rfl: 0    omeprazole (PriLOSEC) 20 mg delayed release capsule, Take 1 capsule (20 mg total) by mouth in the morning , Disp: 90 capsule, Rfl: 0    Rimegepant Sulfate (Nurtec) 75 MG TBDP, Take 75 mg by mouth as needed (migraine) No more than one dose in 24 hours, Disp: 8 tablet, Rfl: 0    Cholecalciferol (Vitamin D3) 50 MCG (2000 UT) TABS, Take 2,000 Units by mouth daily (Patient not taking: No sig reported), Disp: , Rfl:     cyproheptadine (PERIACTIN) 4 mg tablet, Take 2 tablets at night (Patient not taking: Reported on 10/12/2021), Disp: 60 tablet, Rfl: 3    Patient Active Problem List   Diagnosis    Constipation    History of dental surgery    Lyme disease    Chronic migraine without aura with status migrainosus, not intractable    Chronic tension-type headache, not intractable    Insomnia    Vapes nicotine containing substance    Generalized abdominal pain    Heartburn       Objective:  /71   Pulse 71   Ht 4' 11" (1 499 m)   Wt 63 9 kg (140 lb 12 8 oz)   LMP 05/19/2022 (Exact Date)   BMI 28 44 kg/m²     Right Knee Exam     Range of Motion   The patient has normal right knee ROM  Other   Effusion: no effusion present      Left Knee Exam     Range of Motion   The patient has normal left knee ROM  Other   Effusion: no effusion present          Observations   Left Knee   Negative for effusion  Right Knee   Negative for effusion  Physical Exam  Musculoskeletal:      Right knee: No effusion  Left knee: No effusion             Neurologic Exam    Procedures    I have personally reviewed the written report of the pertinent studies     X-rays bilateral knees with no acute findings, no significant degenerative changes

## 2022-06-15 ENCOUNTER — OFFICE VISIT (OUTPATIENT)
Dept: GASTROENTEROLOGY | Facility: MEDICAL CENTER | Age: 32
End: 2022-06-15
Payer: COMMERCIAL

## 2022-06-15 ENCOUNTER — TELEPHONE (OUTPATIENT)
Dept: NEUROLOGY | Facility: CLINIC | Age: 32
End: 2022-06-15

## 2022-06-15 VITALS
HEART RATE: 74 BPM | TEMPERATURE: 100 F | WEIGHT: 137.6 LBS | DIASTOLIC BLOOD PRESSURE: 78 MMHG | SYSTOLIC BLOOD PRESSURE: 104 MMHG | BODY MASS INDEX: 27.79 KG/M2

## 2022-06-15 DIAGNOSIS — K59.00 CONSTIPATION, UNSPECIFIED CONSTIPATION TYPE: ICD-10-CM

## 2022-06-15 DIAGNOSIS — R12 HEARTBURN: Primary | ICD-10-CM

## 2022-06-15 PROCEDURE — 99214 OFFICE O/P EST MOD 30 MIN: CPT | Performed by: PHYSICIAN ASSISTANT

## 2022-06-15 NOTE — TELEPHONE ENCOUNTER
[facility] PA is about to     Key [id number]    Per enc 21-PA  22    PA initiated on [facility]    Awaiting determination

## 2022-06-15 NOTE — PROGRESS NOTES
Assessment/Plan:     Diagnoses and all orders for this visit:    Heartburn  She was previously complaining of heartburn  She states it has been intermittent  She uses the omeprazole on an as-needed basis  Constipation, unspecified constipation type  She continues to complain of constipation  Linzess 145 was of no benefit, 290 causes her diarrhea, despite taking it every 2-3 days  I suggested adding MiraLax to the Linzess 145 versus switching medication altogether  She would prefer to try a new medication, will start with Amitiza 24 mcg which was sent to her pharmacy  Depending on results can titrate down accordingly  She will call with an update   -     lubiprostone (AMITIZA) 24 mcg capsule; Take 1 capsule (24 mcg total) by mouth 2 (two) times a day with meals    Will see her back in 3 months or sooner if necessary  Subjective:      Patient ID: Caesar Bosworth is a 33 y o  female  HPI     This is a follow-up for heartburn and constipation  At her last visit she was started on omeprazole 20 mg  She states she is taking the medication on an as-needed basis and works well for her  She unfortunately continues to have constipation  She is currently on Linzess 290 mcg but it causes her diarrhea  We then tried 145 mcg but she states this did not work at all  She has tried to space her Linzess taking it every 2-3 days, on the day she does not take it has hard small pebble like stools and on the days that she does has multiple episodes of diarrhea  This is also associated with abdominal pain  She underwent colonoscopy in October of 2021 which showed small hemorrhoids      Patient Active Problem List   Diagnosis    Constipation    History of dental surgery    Lyme disease    Chronic migraine without aura with status migrainosus, not intractable    Chronic tension-type headache, not intractable    Insomnia    Vapes nicotine containing substance    Generalized abdominal pain    Heartburn     Allergies   Allergen Reactions    Apple - Food Allergy Anaphylaxis     Current Outpatient Medications on File Prior to Visit   Medication Sig    doxycycline hyclate (VIBRA-TABS) 100 mg tablet Take 1 tablet (100 mg total) by mouth 2 (two) times a day for 14 days    ergocalciferol (VITAMIN D2) 50,000 units Take 1 capsule (50,000 Units total) by mouth once a week    ketorolac (TORADOL) 10 mg tablet Take 1 tablet (10 mg total) by mouth every 8 (eight) hours as needed for moderate pain or severe pain (migraine cocktail - one tablet ketorolac + one tablet metoclopramide + one tablet benadryl)    nortriptyline (PAMELOR) 75 MG capsule Take 1 capsule (75 mg total) by mouth daily at bedtime    Rimegepant Sulfate (Nurtec) 75 MG TBDP Take 75 mg by mouth as needed (migraine) No more than one dose in 24 hours    [DISCONTINUED] linaCLOtide (Linzess) 290 MCG CAPS Take 1 capsule by mouth daily with breakfast    Cholecalciferol (Vitamin D3) 50 MCG (2000 UT) TABS Take 2,000 Units by mouth daily (Patient not taking: No sig reported)    cyproheptadine (PERIACTIN) 4 mg tablet Take 2 tablets at night (Patient not taking: No sig reported)    Diclofenac Sodium (VOLTAREN) 1 % Apply 2 g topically 4 (four) times a day (Patient not taking: Reported on 6/15/2022)    ibuprofen (MOTRIN) 600 mg tablet Take 1 tablet (600 mg total) by mouth every 8 (eight) hours as needed for mild pain or moderate pain (Patient not taking: Reported on 6/15/2022)    lidocaine (XYLOCAINE) 2 % topical gel Apply topically as needed for mild pain (Patient not taking: Reported on 6/15/2022)    methylPREDNISolone 4 MG tablet therapy pack Use as directed on package (Patient not taking: Reported on 6/15/2022)    omeprazole (PriLOSEC) 20 mg delayed release capsule Take 1 capsule (20 mg total) by mouth in the morning  (Patient not taking: Reported on 6/15/2022)     No current facility-administered medications on file prior to visit       Family History   Problem Relation Age of Onset    No Known Problems Mother     No Known Problems Father      Past Medical History:   Diagnosis Date    Frequent headaches     Lyme disease      Social History     Socioeconomic History    Marital status: Single     Spouse name: None    Number of children: None    Years of education: None    Highest education level: None   Occupational History    None   Tobacco Use    Smoking status: Former Smoker     Packs/day: 0 00     Types: Cigarettes    Smokeless tobacco: Never Used    Tobacco comment: Quit Oct 2021   Vaping Use    Vaping Use: Never used   Substance and Sexual Activity    Alcohol use: Not Currently    Drug use: Not Currently    Sexual activity: Yes   Other Topics Concern    None   Social History Narrative    ** Merged History Encounter **         Flu shot:no     Social Determinants of Health     Financial Resource Strain: Low Risk     Difficulty of Paying Living Expenses: Not hard at all   Food Insecurity: No Food Insecurity    Worried About [address] in the Last Year: Never true    [address] in the Last Year: Never true   Transportation Needs: No Transportation Needs    Lack of Transportation (Medical): No    Lack of Transportation (Non-Medical): No   Physical Activity: Not on file   Stress: Not on file   Social Connections: Not on file   Intimate Partner Violence: Not on file   Housing Stability: Low Risk     Unable to Pay for Housing in the Last Year: No    Number of Places Lived in the Last Year: 1    Unstable Housing in the Last Year: No     Past Surgical History:   Procedure Laterality Date    WISDOM TOOTH EXTRACTION           Review of Systems   All other systems reviewed and are negative          Objective:      /78 (BP Location: Right arm, Patient Position: Sitting, Cuff Size: Adult)   Pulse 74   Temp 100 °F (37 8 °C) (Probe)   Wt 62 4 kg (137 lb 9 6 oz)   LMP 05/19/2022 (Exact Date)   BMI 27 79 kg/m² Physical Exam  Constitutional:       Appearance: Normal appearance  She is well-developed  HENT:      Head: Normocephalic and atraumatic  Eyes:      Conjunctiva/sclera: Conjunctivae normal    Cardiovascular:      Rate and Rhythm: Normal rate and regular rhythm  Pulmonary:      Effort: Pulmonary effort is normal       Breath sounds: Normal breath sounds  Abdominal:      General: Bowel sounds are normal  There is no distension  Palpations: Abdomen is soft  Tenderness: There is no abdominal tenderness  Musculoskeletal:         General: Normal range of motion  Cervical back: Normal range of motion  Skin:     General: Skin is warm and dry  Neurological:      Mental Status: She is alert and oriented to person, place, and time     Psychiatric:         Mood and Affect: Mood normal          Behavior: Behavior normal

## 2022-06-17 NOTE — TELEPHONE ENCOUNTER
PA denied  PA must be initiated through primary ins first      Dipika Worrell, spoke w/ Tessie Duong and states that they only have [facility] ins on file       Called [phone] and left a message on pt's answering machine for a call back    Need new ins info

## 2022-06-18 DIAGNOSIS — G43.701 CHRONIC MIGRAINE WITHOUT AURA WITH STATUS MIGRAINOSUS, NOT INTRACTABLE: ICD-10-CM

## 2022-06-18 DIAGNOSIS — M25.50 MULTIPLE JOINT PAIN: ICD-10-CM

## 2022-06-20 RX ORDER — NORTRIPTYLINE HYDROCHLORIDE 75 MG/1
CAPSULE ORAL
Qty: 90 CAPSULE | Refills: 0 | Status: SHIPPED | OUTPATIENT
Start: 2022-06-20 | End: 2022-08-02 | Stop reason: SDUPTHER

## 2022-06-29 NOTE — TELEPHONE ENCOUNTER
Patient returned call; she said she only has [employer] and no other insurance  She will call them to clarify and then call us back when confirmed so we can resubmit the PA

## 2022-07-09 DIAGNOSIS — K59.00 CONSTIPATION, UNSPECIFIED CONSTIPATION TYPE: ICD-10-CM

## 2022-07-13 ENCOUNTER — TELEPHONE (OUTPATIENT)
Dept: GASTROENTEROLOGY | Facility: MEDICAL CENTER | Age: 32
End: 2022-07-13

## 2022-07-13 NOTE — TELEPHONE ENCOUNTER
Started prior authorization for Lubiprostone 24 mcg [id number]

## 2022-07-14 NOTE — TELEPHONE ENCOUNTER
Per [facility] Lubiprostone 24 mcg was denied due to insurance wanting patient to try [employer] Tablet and Amitiza Capsule (brand name)

## 2022-07-19 ENCOUNTER — TELEPHONE (OUTPATIENT)
Dept: PODIATRY | Facility: CLINIC | Age: 32
End: 2022-07-19

## 2022-07-19 NOTE — TELEPHONE ENCOUNTER
Tried calling the patient to let her know Dr Waldrop answer  Left a voivemail message letting her know stretching 3 times a day if very important, if she is still having pain icing and taking  Tylenol or motrin

## 2022-07-19 NOTE — TELEPHONE ENCOUNTER
Hello ,I'm forwarding you Dr Octavio Jim' response  I'm not sure sure if you have access to all of our pools?

## 2022-07-20 ENCOUNTER — TELEPHONE (OUTPATIENT)
Dept: NEUROLOGY | Facility: CLINIC | Age: 32
End: 2022-07-20

## 2022-07-20 NOTE — TELEPHONE ENCOUNTER
Called and left a voicemail for patient - Please call back to confirm upcoming appointment with [facility]  Provided patient with apt date, time and location  Informed patient that check in is at least 15 minutes prior to apt time

## 2022-08-02 ENCOUNTER — OFFICE VISIT (OUTPATIENT)
Dept: FAMILY MEDICINE CLINIC | Facility: CLINIC | Age: 32
End: 2022-08-02

## 2022-08-02 VITALS
SYSTOLIC BLOOD PRESSURE: 116 MMHG | OXYGEN SATURATION: 99 % | DIASTOLIC BLOOD PRESSURE: 64 MMHG | TEMPERATURE: 96.5 F | BODY MASS INDEX: 28.22 KG/M2 | HEART RATE: 73 BPM | HEIGHT: 59 IN | WEIGHT: 140 LBS | RESPIRATION RATE: 18 BRPM

## 2022-08-02 DIAGNOSIS — G43.701 CHRONIC MIGRAINE WITHOUT AURA WITH STATUS MIGRAINOSUS, NOT INTRACTABLE: ICD-10-CM

## 2022-08-02 DIAGNOSIS — M25.50 MULTIPLE JOINT PAIN: Primary | ICD-10-CM

## 2022-08-02 PROCEDURE — 99214 OFFICE O/P EST MOD 30 MIN: CPT

## 2022-08-02 RX ORDER — NORTRIPTYLINE HYDROCHLORIDE 75 MG/1
75 CAPSULE ORAL
Qty: 90 CAPSULE | Refills: 0 | Status: SHIPPED | OUTPATIENT
Start: 2022-08-02 | End: 2022-10-08

## 2022-08-02 RX ORDER — NORTRIPTYLINE HYDROCHLORIDE 25 MG/1
25 CAPSULE ORAL
Qty: 90 CAPSULE | Refills: 0 | Status: SHIPPED | OUTPATIENT
Start: 2022-08-02 | End: 2022-10-08

## 2022-08-02 NOTE — PROGRESS NOTES
[address] [address] PRACTICE IRAM    NAME: Candelaria Gardner  AGE: 33 y o  SEX: female  : 1990     DATE: 2022     Assessment and Plan:     Problem List Items Addressed This Visit        Cardiovascular and Mediastinum    Chronic migraine without aura with status migrainosus, not intractable     - Continue to follow with Neurology   - Nurtec and Tylenol PRN  - Avoid headache triggers and overuse of OTC pain relievers  Relevant Medications    nortriptyline (PAMELOR) 75 MG capsule    nortriptyline (PAMELOR) 25 mg capsule       Other    Multiple joint pain - Primary     - Increase nortriptyline to 100 mg nightly  - Pt declined PT, consider in future if pain persists  - Continue vitamin D 50,000 units weekly, ensure adequate calcium intake in diet  Relevant Medications    nortriptyline (PAMELOR) 75 MG capsule            Return in about 3 months (around 2022) for Annual physical      Chief Complaint:     Chief Complaint   Patient presents with    Follow-up      History of Present Illness:     Candelaria Gardner presented to the office to follow up for polyarthralgia and migraines  Pt states pain is unchanged, worsens with rest, lying down to sleep  Occurs in neck/upper back, knees, and right wrist  Pt works assembling magazines, tries to alternate position between sitting and standing throughout the day  Pt takes warm baths to help with the pain in her knees, states trial of steroids was ineffective, OTC arthritis medication helps for about 1 hour  Pt still taking high dose vitamin D  Exercises regularly  Migraines still occurring about 2x per week  Currently taking nortriptyline, Nurtec and Tylenol PRN  Review of Systems:     Review of Systems   Constitutional: Negative for fatigue, fever and unexpected weight change  Respiratory: Negative for cough, shortness of breath and wheezing      Cardiovascular: Negative for chest pain, palpitations and leg swelling  Genitourinary: Negative for difficulty urinating  Musculoskeletal: Positive for arthralgias, back pain, myalgias and neck pain  Neurological: Positive for headaches  Negative for dizziness  All other systems reviewed and are negative  Past Medical History:     Past Medical History:   Diagnosis Date    Frequent headaches     Lyme disease       Past Surgical History:     Past Surgical History:   Procedure Laterality Date    WISDOM TOOTH EXTRACTION        Social History:     Social History     Socioeconomic History    Marital status: Single     Spouse name: None    Number of children: None    Years of education: None    Highest education level: None   Occupational History    None   Tobacco Use    Smoking status: Former Smoker     Packs/day: 0 00     Types: Cigarettes    Smokeless tobacco: Never Used    Tobacco comment: Quit Oct 2021   Vaping Use    Vaping Use: Never used   Substance and Sexual Activity    Alcohol use: Not Currently    Drug use: Not Currently    Sexual activity: Yes   Other Topics Concern    None   Social History Narrative    ** Merged History Encounter **         Flu shot:no     Social Determinants of Health     Financial Resource Strain: Low Risk     Difficulty of Paying Living Expenses: Not hard at all   Food Insecurity: No Food Insecurity    Worried About Running Out of Food in the Last Year: Never true    [place] of Food in the Last Year: Never true   Transportation Needs: No Transportation Needs    Lack of Transportation (Medical): No    Lack of Transportation (Non-Medical):  No   Physical Activity: Not on file   Stress: Not on file   Social Connections: Not on file   Intimate Partner Violence: Not on file   Housing Stability: Low Risk     Unable to Pay for Housing in the Last Year: No    Number of Places Lived in the Last Year: 1    Unstable Housing in the Last Year: No      Family History:     Family History Problem Relation Age of Onset    No Known Problems Mother     No Known Problems Father       Current Medications:     Current Outpatient Medications   Medication Sig Dispense Refill    nortriptyline (PAMELOR) 25 mg capsule Take 1 capsule (25 mg total) by mouth daily at bedtime 90 capsule 0    nortriptyline (PAMELOR) 75 MG capsule Take 1 capsule (75 mg total) by mouth daily at bedtime 90 capsule 0    Cholecalciferol (Vitamin D3) 50 MCG (2000 UT) TABS Take 2,000 Units by mouth daily (Patient not taking: No sig reported)      cyproheptadine (PERIACTIN) 4 mg tablet Take 2 tablets at night (Patient not taking: No sig reported) 60 tablet 3    Diclofenac Sodium (VOLTAREN) 1 % Apply 2 g topically 4 (four) times a day (Patient not taking: Reported on 6/15/2022) 100 g 0    ergocalciferol (VITAMIN D2) 50,000 units Take 1 capsule (50,000 Units total) by mouth once a week 16 capsule 0    ibuprofen (MOTRIN) 600 mg tablet Take 1 tablet (600 mg total) by mouth every 8 (eight) hours as needed for mild pain or moderate pain (Patient not taking: Reported on 6/15/2022) 30 tablet 0    ketorolac (TORADOL) 10 mg tablet Take 1 tablet (10 mg total) by mouth every 8 (eight) hours as needed for moderate pain or severe pain (migraine cocktail - one tablet ketorolac + one tablet metoclopramide + one tablet benadryl) 20 tablet 0    lidocaine (XYLOCAINE) 2 % topical gel Apply topically as needed for mild pain (Patient not taking: Reported on 6/15/2022) 30 mL 0    lubiprostone (AMITIZA) 24 mcg capsule Take 1 capsule (24 mcg total) by mouth 2 (two) times a day with meals 60 capsule 0    omeprazole (PriLOSEC) 20 mg delayed release capsule Take 1 capsule (20 mg total) by mouth in the morning   (Patient not taking: Reported on 6/15/2022) 90 capsule 0    Rimegepant Sulfate (Nurtec) 75 MG TBDP Take 75 mg by mouth as needed (migraine) No more than one dose in 24 hours 8 tablet 0     No current facility-administered medications for this visit  Allergies: Allergies   Allergen Reactions    Apple - Food Allergy Anaphylaxis      Physical Exam:     /64 (BP Location: Right arm, Patient Position: Sitting, Cuff Size: Standard)   Pulse 73   Temp (!) 96 5 °F (35 8 °C) (Temporal)   Resp 18   Ht 4' 11" (1 499 m)   Wt 63 5 kg (140 lb)   SpO2 99%   BMI 28 28 kg/m²     Physical Exam  Vitals reviewed  Constitutional:       General: She is not in acute distress  Appearance: She is overweight  She is not ill-appearing or diaphoretic  HENT:      Head: Normocephalic and atraumatic  Right Ear: External ear normal       Left Ear: External ear normal       Nose: Nose normal    Eyes:      General: Lids are normal       Conjunctiva/sclera: Conjunctivae normal    Neck:      Thyroid: No thyromegaly or thyroid tenderness  Cardiovascular:      Rate and Rhythm: Normal rate and regular rhythm  Heart sounds: Normal heart sounds  No murmur heard  Pulmonary:      Effort: Pulmonary effort is normal  No tachypnea  Breath sounds: Normal breath sounds  No decreased breath sounds or wheezing  Musculoskeletal:      Cervical back: Tenderness present  No swelling or deformity  Thoracic back: Normal       Lumbar back: Normal       Right knee: Normal       Left knee: Normal       Right lower leg: No edema  Left lower leg: No edema  Lymphadenopathy:      Cervical: No cervical adenopathy  Skin:     General: Skin is warm and dry  Neurological:      Mental Status: She is alert and oriented to person, place, and time  Motor: Motor function is intact  Gait: Gait is intact     Psychiatric:         Mood and Affect: Mood and affect normal           Shila Maurer 35

## 2022-08-04 PROBLEM — M25.50 MULTIPLE JOINT PAIN: Status: ACTIVE | Noted: 2022-08-04

## 2022-08-04 NOTE — ASSESSMENT & PLAN NOTE
- Increase nortriptyline to 100 mg nightly  - Pt declined PT, consider in future if pain persists  - Continue vitamin D 50,000 units weekly, ensure adequate calcium intake in diet

## 2022-08-04 NOTE — ASSESSMENT & PLAN NOTE
- Continue to follow with Neurology   - Nurtec and Tylenol PRN  - Avoid headache triggers and overuse of OTC pain relievers

## 2022-08-08 ENCOUNTER — OFFICE VISIT (OUTPATIENT)
Dept: OBGYN CLINIC | Facility: MEDICAL CENTER | Age: 32
End: 2022-08-08
Payer: COMMERCIAL

## 2022-08-08 VITALS
DIASTOLIC BLOOD PRESSURE: 71 MMHG | HEART RATE: 84 BPM | BODY MASS INDEX: 28.47 KG/M2 | WEIGHT: 141.2 LBS | SYSTOLIC BLOOD PRESSURE: 104 MMHG | HEIGHT: 59 IN

## 2022-08-08 DIAGNOSIS — M25.561 CHRONIC PAIN OF BOTH KNEES: Primary | ICD-10-CM

## 2022-08-08 DIAGNOSIS — M22.2X9 DISORDER OF PATELLOFEMORAL JOINT, UNSPECIFIED LATERALITY: ICD-10-CM

## 2022-08-08 DIAGNOSIS — A69.20 LYME DISEASE: ICD-10-CM

## 2022-08-08 DIAGNOSIS — M25.562 CHRONIC PAIN OF BOTH KNEES: Primary | ICD-10-CM

## 2022-08-08 DIAGNOSIS — G89.29 CHRONIC PAIN OF BOTH KNEES: Primary | ICD-10-CM

## 2022-08-08 PROCEDURE — 99213 OFFICE O/P EST LOW 20 MIN: CPT | Performed by: EMERGENCY MEDICINE

## 2022-08-08 RX ORDER — DOXYCYCLINE 100 MG/1
100 TABLET ORAL 2 TIMES DAILY
Qty: 56 TABLET | Refills: 0 | Status: SHIPPED | OUTPATIENT
Start: 2022-08-08 | End: 2022-09-05

## 2022-08-08 NOTE — PROGRESS NOTES
Assessment/Plan:    Diagnoses and all orders for this visit:    Chronic pain of both knees  -     doxycycline (ADOXA) 100 MG tablet; Take 1 tablet (100 mg total) by mouth 2 (two) times a day for 28 days    Lyme disease  -     doxycycline (ADOXA) 100 MG tablet; Take 1 tablet (100 mg total) by mouth 2 (two) times a day for 28 days    Disorder of patellofemoral joint, unspecified laterality    Patient returns for bilateral knee pain she was not set up for physical therapy but she has [facility]  She also notes bilateral foot pain  She was prescribed doxycycline twice however she did not take the antibiotic as she did test positive for Lyme including a positive Western blot  Thus I have prescribed a 4 week course doxycycline for the patient to take as I did inform her that her symptoms could be coming from Lyme disease and if not treated properly could go to cause chronic symptoms    Return if symptoms worsen or fail to improve  Chief Complaint:     Chief Complaint   Patient presents with    Left Foot - Follow-up    Right Foot - Follow-up       Subjective:   Patient ID: Mortimer Ply is a 33 y o  female  Patient returns   Steps, bicycle, walking and swimming    Initial note:  NP presents for b/l foot pain with radiation of pain up legs to knees  She also notes chronic bilateral anterior knee pain however states this pain is different  She has been treating with Podiatry for plantar fasciitis and has received injections  She does note chronic diffuse aches and pains  Dx with Lyme around March 2020 was treated with Lyme      Review of Systems    The following portions of the patient's chart were reviewed and updated as appropriate:    Allergy:    Allergies   Allergen Reactions    Apple - Food Allergy Anaphylaxis         Past Medical History:   Diagnosis Date    Frequent headaches     Lyme disease        Past Surgical History:   Procedure Laterality Date    WISDOM TOOTH EXTRACTION         Social History     Socioeconomic History    Marital status: Single     Spouse name: Not on file    Number of children: Not on file    Years of education: Not on file    Highest education level: Not on file   Occupational History    Not on file   Tobacco Use    Smoking status: Former Smoker     Packs/day: 0 00     Types: Cigarettes    Smokeless tobacco: Never Used    Tobacco comment: Quit Oct 2021   Vaping Use    Vaping Use: Never used   Substance and Sexual Activity    Alcohol use: Not Currently    Drug use: Not Currently    Sexual activity: Yes   Other Topics Concern    Not on file   Social History Narrative    ** Merged History Encounter **         Flu shot:no     Social Determinants of Health     Financial Resource Strain: Low Risk     Difficulty of Paying Living Expenses: Not hard at all   Food Insecurity: No Food Insecurity    Worried About [address] in the Last Year: Never true    [address] [employer] in the Last Year: Never true   Transportation Needs: No Transportation Needs    Lack of Transportation (Medical): No    Lack of Transportation (Non-Medical):  No   Physical Activity: Not on file   Stress: Not on file   Social Connections: Not on file   Intimate Partner Violence: Not on file   Housing Stability: Low Risk     Unable to Pay for Housing in the Last Year: No    Number of Places Lived in the Last Year: 1    Unstable Housing in the Last Year: No       Family History   Problem Relation Age of Onset    No Known Problems Mother     No Known Problems Father        Medications:    Current Outpatient Medications:     doxycycline (ADOXA) 100 MG tablet, Take 1 tablet (100 mg total) by mouth 2 (two) times a day for 28 days, Disp: 56 tablet, Rfl: 0    ergocalciferol (VITAMIN D2) 50,000 units, Take 1 capsule (50,000 Units total) by mouth once a week, Disp: 16 capsule, Rfl: 0    ketorolac (TORADOL) 10 mg tablet, Take 1 tablet (10 mg total) by mouth every 8 (eight) hours as needed for moderate pain or severe pain (migraine cocktail - one tablet ketorolac + one tablet metoclopramide + one tablet benadryl), Disp: 20 tablet, Rfl: 0    lubiprostone (AMITIZA) 24 mcg capsule, Take 1 capsule (24 mcg total) by mouth 2 (two) times a day with meals, Disp: 60 capsule, Rfl: 0    nortriptyline (PAMELOR) 25 mg capsule, Take 1 capsule (25 mg total) by mouth daily at bedtime, Disp: 90 capsule, Rfl: 0    nortriptyline (PAMELOR) 75 MG capsule, Take 1 capsule (75 mg total) by mouth daily at bedtime, Disp: 90 capsule, Rfl: 0    Rimegepant Sulfate (Nurtec) 75 MG TBDP, Take 75 mg by mouth as needed (migraine) No more than one dose in 24 hours, Disp: 8 tablet, Rfl: 0    Cholecalciferol (Vitamin D3) 50 MCG (2000 UT) TABS, Take 2,000 Units by mouth daily (Patient not taking: No sig reported), Disp: , Rfl:     cyproheptadine (PERIACTIN) 4 mg tablet, Take 2 tablets at night (Patient not taking: No sig reported), Disp: 60 tablet, Rfl: 3    Diclofenac Sodium (VOLTAREN) 1 %, Apply 2 g topically 4 (four) times a day (Patient not taking: No sig reported), Disp: 100 g, Rfl: 0    ibuprofen (MOTRIN) 600 mg tablet, Take 1 tablet (600 mg total) by mouth every 8 (eight) hours as needed for mild pain or moderate pain (Patient not taking: No sig reported), Disp: 30 tablet, Rfl: 0    lidocaine (XYLOCAINE) 2 % topical gel, Apply topically as needed for mild pain (Patient not taking: No sig reported), Disp: 30 mL, Rfl: 0    omeprazole (PriLOSEC) 20 mg delayed release capsule, Take 1 capsule (20 mg total) by mouth in the morning   (Patient not taking: No sig reported), Disp: 90 capsule, Rfl: 0    Patient Active Problem List   Diagnosis    Constipation    History of dental surgery    Lyme disease    Chronic migraine without aura with status migrainosus, not intractable    Chronic tension-type headache, not intractable    Insomnia    Vapes nicotine containing substance    Generalized abdominal pain    Heartburn    Multiple joint pain       Objective:  /71   Pulse 84   Ht 4' 11" (1 499 m)   Wt 64 kg (141 lb 3 2 oz)   BMI 28 52 kg/m²     Ortho Exam    Physical Exam      Neurologic Exam    Procedures    I have personally reviewed the written report of the pertinent studies

## 2022-08-08 NOTE — PATIENT INSTRUCTIONS
Doxiciclina (Por la boca)   Doxycycline ([id number])  Se [place] para tratar y [address]  También se [place] para prevenir la malaria y tratar la rosácea o el acné grave  Pertenece a la clase de antibióticos derivados de la tetraciclina  Denia(s) : Acticlate, Adoxa, Adoxa Saul 1/150, Avidoxy, Doryx, Doryx MPC, LymePak, Mondoxyne NL, Monodox, Morgidox 6G197RS, Morgidox 5Z255TH, Oracea, Targadox, Vibramycin Calcium, Vibramycin Hyclate   Existen muchas otras marcas de Beata  Kely medicamento no debe ser usado cuando:   Kely medicamento no es adecuado para todas las personas  No lo use si ha tenido olesya reacción alérgica a la doxiciclina o a otro antibiótico derivado de la tetraciclina, si está embarazada o dando de lactar  Forma de usar kely medicamento:   Michaela Pancake de liberación [facility], [facility] de liberación prolongada, [place], [place] de liberación programada  Malone médico le indicara cuanto medicamento necesita usar  No use más medicamento de lo indicado  Pregunte a malone médico o farmacéutico si usted debe yevgeniy Dueñas con o sin alimentos  Algunas de las formas que se presenta kely medicamento pueden tomarse con alimentos o con [place], pat otras formas deben tomarse con el estómago vacío  Cápsula: Gara Bickers  No la quiebre, triture, mastique ni david  Cápsulas Oracea®: Kely medicamento debe tomarse con el estómago vacío, por lo menos 1 hora antes o 2 horas después de la comida  Acticlate® Cap cápsulas: Usted puede yevgeniy las tabletas con comida o con leche para evitar la irritación estomacal   Cápsula de liberación prolongada: También puede yevgeniy kely medicamento espolvoreando el contenido de las cápsulas abiertas en puré de [place] y [place]  No pierda ninguno de los gránulos al transferir [employer]  Ingiera la mezcla inmediatamente  No la mastique  No la guarde para usarla más adelante   Puede yevgeniy esta medicina con comida o leche para evitar el malestar estomacal   Tabletas de liberación prolongada: También puede yevgeniy ronald medicamento Leda trozos de la tableta con puré de manzana a temperatura ambiente Ingiera la mezcla inmediatamente  No la mastique  No la guarde para usarla más adelante  Puede yevgeniy esta medicina con comida o leche para evitar el malestar estomacal   Solución oral: Agite el envase didi antes de cada uso  Mida el líquido oral con [place], [place] para uso oral o taza especialmente marcadas para medir medicamentos  Tabletas: Usted puede yevgeniy las tabletas con comida o con leche para evitar la irritación estomacal  Para partir olesya tableta, tómela con el pulgar y [address] [facility]), cerca de la [place]  Luego, aplique suficiente presión nilson para quebrar la [employer]  No use la tableta si no se quiebra en la [place]  [place] todo malone medicamento recetado para eliminar malone infección por completo aunque usted se sienta mejor después de las primeras dosis  Si usted ban la cápsula o tableta, ingiera suficiente líquido para evitar problemas de garganta  Prevención de la malaria: Comience a yevgeniy ronald medicamento 1 o 2 días antes de salir de viaje  [employer] todos los mel erika el viaje  Continúe tomándolo erika 4 semanas después de regresar  No use, sin embargo, ronald medicamento por más de 4 meses  No tome ronald medicamento por más de 9 meses si lo está usando para tratar la rosácea  Use sólo la anand del medicamento que el médico le ha prescrito  Es posible que otras marcas no tengan el mismo [place]  Janie y siga las instrucciones para el paciente que vienen con el medicamento  Hable con malone médico o farmacéutico si tiene alguna pregunta  Si olvida olesya dosis: [place] la dosis tan pronto nilson lo recuerde  Si es nicho la hora para malone próxima dosis, espere hasta entonces para yevgeniy malone dosis regular  No tome medicamento adicional para reponer la dosis que omitió    Guarde el medicamento en un recipiente cerrado a temperatura ambiente y alejado del calor, la humedad y la abby directa  No congele la solución oral   Medicamentos y alimentos que debe evitar:   Consulte con malone médico o farmacéutico antes de usar cualquier medicamento, incluyendo los que compra sin receta médica, las vitaminas y los productos herbales  Algunos medicamento pueden afectar la eficacia de la doxiciclina  Informe a malone médico si está usando cualquiera de los siguientes:  Subsalicilato de bismuto  Medicamentos para el acné (incluyendo isotretinoína)  Píldoras anticonceptivas  Anticoagulantes (incluyendo warfarina)  Medicamentos para las convulsiones (incluyendo carbamazepina, fenobarbital, fenitoína)  Medicamentos que contengan aluminio, calcio, magnesio o gregoria (incluyendo un antiácido o suplementos vitamínicos)  Medicamentos para tratar la psoriasis (incluyendo acitretina)  Antibióticos de penicilina  Medicamentos para el estómago  Precauciones erika el uso de kely medicamento:   Kely medicamento puede causar malformaciones congénitas en el bebé si erika la osmani o el embarazo alguno de los dos está usando kely medicamento  Infórmele a malone médico de inmediato si malone terrence o usted [place]  Es posible que las píldoras anticonceptivas no tengan la misma eficacia cuando se utilizan junto con Dueñas  Use un natacha método anticonceptivo para evitar el embarazo  Informe a malone médico si tiene olesya enfermedad renal o hepática, asma o alergia a los sulfitos  Informe a malone médico si ha tenido [place] o si tiene antecedentes de infecciones por hongos    Kely medicamento puede causar los siguientes problemas:  Cambio permanente del color de los dientes (en niños menores de 8 años)  Reacciones graves en la piel, incluida reacción a un medicamento con eosinofilia y síntomas sistémicos (DRESS)  Aumento de presión dentro de la marta  Infección por hongos  Problemas del sistema inmunitario  Kely medicamento puede causar diarrea  Llame a malone médico si la diarrea se intensifica, no se detiene, o contiene tate  No tome ningún medicamento para suspender la diarrea hasta que hable con malone médico  La diarrea puede ocurrir 2 meces o más después de suspender el uso de Dueñas  Kely medicamento puede hacer que malone piel se vuelva más sensible a la abby solar  Use protector solar  No use lámparas ni cámaras bronceadoras  Dígale a todo médico o dentista encargado de atenderle que usted está usando Beata  Puede que kely medicamento afecte algunos resultados de [employer]  Llame a malone médico si joana síntomas no mejoran, o si [place]  El médico solicitará exámenes de laboratorio erika las citas de rutina para revisar los efectos de Beata  Asista a todas joana citas  Guarde todos los medicamentos fuera del alcance de los niños  Nunca comparta joana medicamentos con [employer]  Efectos secundarios que pueden presentarse erika el uso de kely medicamento:   Consulte inmediatamente con el médico si nota cualquiera de estos efectos secundarios:  Reacción alérgica: Comezón o ronchas, hinchazón del po o las gene, hinchazón u hormigueo en la boca o garganta, opresión en el pecho, dificultad para respirar  Ampollas, despellejamiento, sarpullido wood en la piel    Ardor, dolor o irritación en la parte superior del estómago o la garganta  Diarrea con posible presencia de Linh Dubin, escalofríos, tos, flujo o congestión nasal, dolor de garganta, dolor corporal  Dolor en las articulaciones, cansancio o debilidad inusuales  Dolor de marta intenso, mareos o cambios en la vista  Dolor de estómago súbito e intenso, náuseas, vómito, desvanecimientos  Inflamación, dolor o sensibilidad al tacto en los ganglios linfáticos del jurgen, la axila o la herminio, piel u ojos amarillos  Consulte con el médico si nota los siguientes efectos secundarios menos graves:   Oscurecimiento de la piel, las cicatrices, los dientes o las encías  Ampollas o manchas teri en la boca, labios o garganta  Consulte con el médico si nota otros efectos secundarios que sridevi son causados por ronald medicamento  Llame a malone médico para consultarle Claudia Dean puede notificar joana efectos secundarios al FDA al [phone]  © Copyright [email or web address] 2022 Information is for End User's use only and may not be sold, redistributed or otherwise used for commercial purposes  Esta información es sólo para uso en educación  Malone intención no es darle un consejo médico sobre enfermedades o tratamientos  Colsulte con malone [place] farmacéutico antes de seguir cualquier régimen médico para saber si es seguro y efectivo para usted

## 2022-08-16 ENCOUNTER — OFFICE VISIT (OUTPATIENT)
Dept: PODIATRY | Facility: CLINIC | Age: 32
End: 2022-08-16
Payer: COMMERCIAL

## 2022-08-16 ENCOUNTER — TELEPHONE (OUTPATIENT)
Dept: GASTROENTEROLOGY | Facility: MEDICAL CENTER | Age: 32
End: 2022-08-16

## 2022-08-16 VITALS
DIASTOLIC BLOOD PRESSURE: 76 MMHG | WEIGHT: 141 LBS | HEART RATE: 79 BPM | HEIGHT: 59 IN | BODY MASS INDEX: 28.43 KG/M2 | SYSTOLIC BLOOD PRESSURE: 111 MMHG

## 2022-08-16 DIAGNOSIS — M13.0: ICD-10-CM

## 2022-08-16 DIAGNOSIS — M72.2 BILATERAL PLANTAR FASCIITIS: ICD-10-CM

## 2022-08-16 DIAGNOSIS — M72.2 BILATERAL PLANTAR FASCIITIS: Primary | ICD-10-CM

## 2022-08-16 PROCEDURE — 99213 OFFICE O/P EST LOW 20 MIN: CPT | Performed by: PODIATRIST

## 2022-08-16 RX ORDER — MELOXICAM 15 MG/1
15 TABLET ORAL DAILY
Qty: 30 TABLET | Refills: 3 | Status: SHIPPED | OUTPATIENT
Start: 2022-08-16 | End: 2022-08-16

## 2022-08-16 RX ORDER — MELOXICAM 15 MG/1
TABLET ORAL
Qty: 90 TABLET | Refills: 0 | Status: SHIPPED | OUTPATIENT
Start: 2022-08-16 | End: 2022-09-16

## 2022-08-16 NOTE — TELEPHONE ENCOUNTER
M to tell patient we are changing her office visit from 09/15 to 08/30 with Brown Recinos due to her not being in the office  To give us a call if that does not work for her

## 2022-08-16 NOTE — PATIENT INSTRUCTIONS
Plantar Fasciitis Exercises   WHAT YOU NEED TO KNOW:   Plantar fasciitis exercises help stretch your plantar fascia, calf muscles, and Achilles tendon  They also help strengthen the muscles that support your heel and foot  Exercises and stretching can help prevent plantar fasciitis from getting worse or coming back  DISCHARGE INSTRUCTIONS:   Contact your healthcare provider if:   Your pain and swelling increase  You develop new knee, hip, or back pain  You have questions or concerns about your condition or care  How to do plantar fasciitis exercises:  Ask your healthcare provider when to start these exercises and how often to do them  Slant board stretch:  Stand on a slanted board with your toes higher than your heel  Press your heel into the board  Keep your knee slightly bent  Hold this position for 1 minute  Repeat 5 times  Heel stretch:  Stand up straight with your hands on a wall  Place your injured leg slightly behind your other leg  Keep your heels flat on the floor, lean forward, and bend both knees  Hold for 30 seconds  Calf stretch:  Stand up straight with your hands on a wall  Step forward so that your uninjured foot is in front of your injured foot  Keep your front leg bent and your back leg straight  Gently lean forward until you feel your calf stretch  Hold for 30 seconds and then relax  Seated plantar fascia stretch:  Sit on a firm surface, such as the floor or a mat  Extend your legs out in front of you  Raise your injured foot a few inches off the ground  Keep your leg straight  Grab the toes of your injured foot and pull them toward you  With your other hand, feel your plantar fascia  You should feel it press outward  Hold for 30 seconds  If you cannot reach your toes, loop a towel or tie around your foot  Gently pull on the towel or tie and flex your toes toward you  Heel raises:  Stand on the injured leg  Raise your other leg off the ground   Hold onto a railing or wall for balance  Slowly rise up on the toes of your injured leg  Hold for 5 seconds  Slowly lower your heel to the ground  Toe curls:  Place a towel on the floor  Put your foot flat on the towel  Grab the towel with your toes by curling them around the towel  Lift the towel up with your toes  Toe taps:  Sit down and place your foot flat on the floor  Keep your heel on the floor  Point all your toes up toward the ceiling  While the 4 smaller toes are pointed up, bend your big toe down and tap it on the ground  Do 10 to 50 taps  Point all 5 toes up toward the ceiling again  This time keep your big toe pointed up and tap the 4 smaller toes on the ground  Do 10 to 50 taps each time  Follow up with your doctor as directed:  Write down your questions so you remember to ask them during your visits  © Copyright "[employer]" 2022 Information is for End User's use only and may not be sold, redistributed or otherwise used for commercial purposes  All illustrations and images included in CareNotes® are the copyrighted property of A [employer]  or Analy Murrell   The above information is an  only  It is not intended as medical advice for individual conditions or treatments  Talk to your doctor, nurse or pharmacist before following any medical regimen to see if it is safe and effective for you

## 2022-08-16 NOTE — PROGRESS NOTES
Assessment/Plan:       Diagnoses and all orders for this visit:    Bilateral plantar fasciitis  -     Ambulatory referral to Physical Therapy; Future  -     meloxicam (Mobic) 15 mg tablet; Take 1 tablet (15 mg total) by mouth daily    Polyarthritis of ankle and foot  -     Ambulatory referral to Physical Therapy; Future  -     meloxicam (Mobic) 15 mg tablet; Take 1 tablet (15 mg total) by mouth daily      Patient has been getting polyarthritis secondary to Lyme disease  He recurrent ankle/heel pain is likely related given she has been doing home therapy and 2 steroid injections  Can try formal PT, shoe inserts  Meloxicam daily for inflammation, take with food    Finish doxycycline as prescribed, tolerating without difficulty    She is referred to rheumatology but not seeing until December  Subjective:      Patient ID: Piter Mo is a 33 y o  female  Patient had heel injections last June for plantar fascitis  The pain improved but came back about a month  She has had 2 injections in her heels that have only provided a month improvement each time  She was diagnosed with lyme disease last May and is getting a work up for possible Lupus  She was on doxycycline for a month  She is tolerating overall well  The following portions of the patient's history were reviewed and updated as appropriate: allergies, current medications, past family history, past medical history, past social history, past surgical history and problem list     Review of Systems    Constitutional: Negative  HENT: Negative for sinus pressure and sinus pain  Respiratory: Negative for cough and shortness of breath  Cardiovascular: Negative for chest pain and leg swelling  Gastrointestinal: Negative for diarrhea, nausea and vomiting  Musculoskeletal: foot and ankle joint pain, multiple joint pains  Skin: Negative for color change  Negative for rash and wound     Neurological: Negative for weakness, numbness and headaches  Psychiatric/Behavioral: The patient is not nervous/anxious  Objective:      /76   Pulse 79   Ht 4' 11" (1 499 m)   Wt 64 kg (141 lb)   BMI 28 48 kg/m²          Physical Exam  Vitals reviewed  Cardiovascular:      Rate and Rhythm: Normal rate  Pulses: Normal pulses  Pulmonary:      Effort: Pulmonary effort is normal  No respiratory distress  Musculoskeletal:         General: Swelling (knees and ankle are edematous and stiff  MMT 4/5) and tenderness (nonspecific tenderness throughout midffot, heels and anklles, no instability  ) present  No deformity  Skin:     Capillary Refill: Capillary refill takes less than 2 seconds  Findings: No erythema or rash  Neurological:      Mental Status: She is alert and oriented to person, place, and time  Sensory: No sensory deficit  Motor: Weakness present        Gait: Gait normal

## 2022-08-25 ENCOUNTER — HOSPITAL ENCOUNTER (EMERGENCY)
Facility: HOSPITAL | Age: 32
Discharge: HOME | End: 2022-08-25
Attending: STUDENT IN AN ORGANIZED HEALTH CARE EDUCATION/TRAINING PROGRAM
Payer: COMMERCIAL

## 2022-08-25 VITALS
RESPIRATION RATE: 22 BRPM | WEIGHT: 139.77 LBS | HEART RATE: 98 BPM | SYSTOLIC BLOOD PRESSURE: 116 MMHG | BODY MASS INDEX: 28.23 KG/M2 | TEMPERATURE: 98.8 F | DIASTOLIC BLOOD PRESSURE: 68 MMHG | OXYGEN SATURATION: 98 %

## 2022-08-25 DIAGNOSIS — S61.412A LACERATION OF LEFT HAND WITHOUT FOREIGN BODY, INITIAL ENCOUNTER: Primary | ICD-10-CM

## 2022-08-25 PROCEDURE — 99282 EMERGENCY DEPT VISIT SF MDM: CPT

## 2022-08-25 PROCEDURE — 99283 EMERGENCY DEPT VISIT LOW MDM: CPT

## 2022-08-25 PROCEDURE — 12002 RPR S/N/AX/GEN/TRNK2.6-7.5CM: CPT

## 2022-08-25 RX ORDER — LIDOCAINE HYDROCHLORIDE AND EPINEPHRINE 10; 10 MG/ML; UG/ML
10 INJECTION, SOLUTION INFILTRATION; PERINEURAL ONCE
Status: COMPLETED | OUTPATIENT
Start: 2022-08-25 | End: 2022-08-25

## 2022-08-25 RX ORDER — LIDOCAINE HYDROCHLORIDE AND EPINEPHRINE BITARTRATE 20; .01 MG/ML; MG/ML
10 INJECTION, SOLUTION SUBCUTANEOUS ONCE
Status: DISCONTINUED | OUTPATIENT
Start: 2022-08-25 | End: 2022-08-25 | Stop reason: CLARIF

## 2022-08-25 RX ORDER — IBUPROFEN 600 MG/1
600 TABLET ORAL ONCE
Status: COMPLETED | OUTPATIENT
Start: 2022-08-25 | End: 2022-08-25

## 2022-08-25 RX ORDER — LIDOCAINE HYDROCHLORIDE AND EPINEPHRINE BITARTRATE 20; .01 MG/ML; MG/ML
5 INJECTION, SOLUTION SUBCUTANEOUS ONCE
Status: DISCONTINUED | OUTPATIENT
Start: 2022-08-25 | End: 2022-08-25

## 2022-08-25 RX ADMIN — LIDOCAINE HYDROCHLORIDE,EPINEPHRINE BITARTRATE 10 ML: 10; .01 INJECTION, SOLUTION INFILTRATION; PERINEURAL at 16:34

## 2022-08-25 RX ADMIN — IBUPROFEN 600 MG: 600 TABLET ORAL at 16:24

## 2022-08-25 NOTE — Clinical Note
Angel Azar was seen and treated in our emergency department on 8/25/2022  No restrictions            Diagnosis:     Daphneyche Schneider    She may return on this date: 09/02/2022         If you have any questions or concerns, please don't hesitate to call        Bekah Talavera PA-C    ______________________________           _______________          _______________  Hospital Representative                              Date                                Time

## 2022-08-26 NOTE — ED PROVIDER NOTES
History  Chief Complaint   Patient presents with    Hand Laceration     Accidentally cut her left hand with a razor     Patient comes in with laceration on her left hand, dorsal surface, after she was using a razor  Bleeding is controlled at this time  Patient is in no acute distress  Laceration appears to proximally be 3-4 cm long  Patient is up-to-date on her tetanus      History provided by:  Patient   used: No    Laceration  Location:  Hand  Length:  4 cm  Depth:  Cutaneous  Time since incident:  30 minutes  Laceration mechanism:  Knife  Tetanus status:  Up to date  Associated symptoms: no fever, no focal weakness, no numbness, no rash, no redness, no swelling and no streaking        Prior to Admission Medications   Prescriptions Last Dose Informant Patient Reported? Taking?    Cholecalciferol (Vitamin D3) 50 MCG (2000 UT) TABS   Yes No   Sig: Take 2,000 Units by mouth daily   Patient not taking: No sig reported   Diclofenac Sodium (VOLTAREN) 1 %   No No   Sig: Apply 2 g topically 4 (four) times a day   Patient not taking: No sig reported   Rimegepant Sulfate (Nurtec) 75 MG TBDP   No No   Sig: Take 75 mg by mouth as needed (migraine) No more than one dose in 24 hours   cyproheptadine (PERIACTIN) 4 mg tablet   No No   Sig: Take 2 tablets at night   Patient not taking: No sig reported   doxycycline (ADOXA) 100 MG tablet   No No   Sig: Take 1 tablet (100 mg total) by mouth 2 (two) times a day for 28 days   ergocalciferol (VITAMIN D2) 50,000 units   No No   Sig: Take 1 capsule (50,000 Units total) by mouth once a week   ibuprofen (MOTRIN) 600 mg tablet   No No   Sig: Take 1 tablet (600 mg total) by mouth every 8 (eight) hours as needed for mild pain or moderate pain   Patient not taking: No sig reported   ketorolac (TORADOL) 10 mg tablet   No No   Sig: Take 1 tablet (10 mg total) by mouth every 8 (eight) hours as needed for moderate pain or severe pain (migraine cocktail - one tablet ketorolac + one tablet metoclopramide + one tablet benadryl)   lidocaine (XYLOCAINE) 2 % topical gel   No No   Sig: Apply topically as needed for mild pain   Patient not taking: No sig reported   lubiprostone (AMITIZA) 24 mcg capsule   No No   Sig: Take 1 capsule (24 mcg total) by mouth 2 (two) times a day with meals   meloxicam (MOBIC) 15 mg tablet   No No   Sig: TAKE 1 TABLET(15 MG) BY MOUTH DAILY   nortriptyline (PAMELOR) 25 mg capsule   No No   Sig: Take 1 capsule (25 mg total) by mouth daily at bedtime   nortriptyline (PAMELOR) 75 MG capsule   No No   Sig: Take 1 capsule (75 mg total) by mouth daily at bedtime   omeprazole (PriLOSEC) 20 mg delayed release capsule   No No   Sig: Take 1 capsule (20 mg total) by mouth in the morning  Patient not taking: No sig reported      Facility-Administered Medications: None       Past Medical History:   Diagnosis Date    Frequent headaches     Lyme disease        Past Surgical History:   Procedure Laterality Date    WISDOM TOOTH EXTRACTION         Family History   Problem Relation Age of Onset    No Known Problems Mother     No Known Problems Father      I have reviewed and agree with the history as documented  E-Cigarette/Vaping    E-Cigarette Use Never User      E-Cigarette/Vaping Substances    Nicotine No     THC No     CBD No     Flavoring No     Other No     Unknown No      Social History     Tobacco Use    Smoking status: Former Smoker     Packs/day: 0 00     Types: Cigarettes    Smokeless tobacco: Never Used    Tobacco comment: Quit Oct 2021   Vaping Use    Vaping Use: Never used   Substance Use Topics    Alcohol use: Not Currently    Drug use: Not Currently       Review of Systems   Constitutional: Negative  Negative for fever  HENT: Negative  Eyes: Negative  Respiratory: Negative  Cardiovascular: Negative  Gastrointestinal: Negative  Genitourinary: Negative  Musculoskeletal: Negative for arthralgias and joint swelling  Skin: Negative  Negative for rash and wound  Neurological: Negative  Negative for focal weakness  Psychiatric/Behavioral: Negative  Physical Exam  Physical Exam  Vitals reviewed  Constitutional:       Appearance: Normal appearance  She is normal weight  HENT:      Head: Normocephalic and atraumatic  Right Ear: External ear normal       Left Ear: External ear normal       Nose: Nose normal    Eyes:      Conjunctiva/sclera: Conjunctivae normal    Cardiovascular:      Rate and Rhythm: Normal rate  Pulmonary:      Effort: Pulmonary effort is normal    Musculoskeletal:         General: Normal range of motion  Cervical back: Normal range of motion  Skin:     General: Skin is warm and dry  Comments: 4 cm laceration on dorsal surface of patient's left hand   Neurological:      Mental Status: She is alert  Vital Signs  ED Triage Vitals   Temperature Pulse Respirations Blood Pressure SpO2   08/25/22 1603 08/25/22 1603 08/25/22 1603 08/25/22 1603 08/25/22 1603   98 8 °F (37 1 °C) 98 22 116/68 98 %      Temp Source Heart Rate Source Patient Position - Orthostatic VS BP Location FiO2 (%)   08/25/22 1603 08/25/22 1603 08/25/22 1603 08/25/22 1603 --   Tympanic Monitor Sitting Right arm       Pain Score       08/25/22 1624       5           Vitals:    08/25/22 1603   BP: 116/68   Pulse: 98   Patient Position - Orthostatic VS: Sitting         Visual Acuity      ED Medications  Medications   ibuprofen (MOTRIN) tablet 600 mg (600 mg Oral Given 8/25/22 1624)   lidocaine-epinephrine (XYLOCAINE/EPINEPHRINE) 1 %-1:100,000 injection 10 mL (10 mL Infiltration Given by Other 8/25/22 1634)       Diagnostic Studies  Results Reviewed     None                 No orders to display              Procedures  Laceration repair    Date/Time: 8/26/2022 12:01 AM  Performed by: Lynsey Hope PA-C  Authorized by: Lynsey Hope PA-C   Consent: Verbal consent obtained    Risks and benefits: risks, benefits and alternatives were discussed  Consent given by: patient  Patient understanding: patient states understanding of the procedure being performed  Patient consent: the patient's understanding of the procedure matches consent given  Procedure consent: procedure consent matches procedure scheduled  Patient identity confirmed: verbally with patient  Body area: upper extremity  Location details: left hand  Laceration length: 4 cm    Anesthesia:  Local Anesthetic: lidocaine 1% with epinephrine  Anesthetic total: 6 mL    Sedation:  Patient sedated: no      Wound Dehiscence:  Superficial Wound Dehiscence: simple closure      Procedure Details:  Irrigation solution: tap water  Irrigation method: tap  Skin closure: 4-0 Prolene  Number of sutures: 5  Technique: simple  Approximation difficulty: simple  Patient tolerance: patient tolerated the procedure well with no immediate complications               ED Course                                             MDM  Number of Diagnoses or Management Options  Laceration of left hand without foreign body, initial encounter: new and does not require workup  Diagnosis management comments: Patient comes in for laceration on left hand  Patient is in no acute distress at this time  Laceration was closed here in the emergency department, the patient was discharged home  Counseling: I had a detailed discussion with the patient and/or guardian regarding: the historical points, exam findings, and any diagnostic results supporting the discharge diagnosis, lab results, radiology results, discharge instructions reviewed with patient and/or family/caregiver and understanding was verbalized   Instructions given to return to the emergency department if symptoms worsen or persist, or if there are any questions or concerns that arise at home       All labs reviewed and utilized in the medical decision making process     All radiology studies independently viewed by me and interpreted by the radiologist     Portions of the record may have been created with voice recognition software   Occasional wrong word or "sound a like" substitutions may have occurred due to the inherent limitations of voice recognition software   Read the chart carefully and recognize, using context, where substitutions have occurred  Risk of Complications, Morbidity, and/or Mortality  Presenting problems: minimal  Diagnostic procedures: minimal  Management options: minimal    Patient Progress  Patient progress: stable      Disposition  Final diagnoses:   Laceration of left hand without foreign body, initial encounter     Time reflects when diagnosis was documented in both MDM as applicable and the Disposition within this note     Time User Action Codes Description Comment    8/25/2022  5:02 PM Aggie Pricilla Add [K96 [id number]] Laceration of left hand without foreign body, initial encounter       ED Disposition     ED Disposition   Discharge    Condition   Stable    Date/Time   Thu Aug 25, 2022  5:02 PM    Comment   [facility] discharge to home/self care                 Follow-up Information     Follow up With Specialties Details Why Contact Info Additional [facility] [address] Minh Miller, Nurse Practitioner   [facility] [address] [facility] [facility] [address]        [place] [address] Heart Emergency Department Emergency Medicine  As needed, If symptoms worsen [address] [facility] [id number]  [facility] Emergency Department          Discharge Medication List as of 8/25/2022  5:02 PM      CONTINUE these medications which have NOT CHANGED    Details   Cholecalciferol (Vitamin D3) 50 MCG (2000 UT) TABS Take 2,000 Units by mouth daily, Historical Med      cyproheptadine (PERIACTIN) 4 mg tablet Take 2 tablets at night, Normal      Diclofenac Sodium (VOLTAREN) 1 % Apply 2 g topically 4 (four) times a day, Starting Thu 4/28/2022, Normal      doxycycline (ADOXA) 100 MG tablet Take 1 tablet (100 mg total) by mouth 2 (two) times a day for 28 days, Starting Mon 8/8/2022, Until Mon 9/5/2022, Normal      ergocalciferol (VITAMIN D2) 50,000 units Take 1 capsule (50,000 Units total) by mouth once a week, Starting Fri 6/3/2022, Normal      ibuprofen (MOTRIN) 600 mg tablet Take 1 tablet (600 mg total) by mouth every 8 (eight) hours as needed for mild pain or moderate pain, Starting Mon 4/18/2022, Normal      ketorolac (TORADOL) 10 mg tablet Take 1 tablet (10 mg total) by mouth every 8 (eight) hours as needed for moderate pain or severe pain (migraine cocktail - one tablet ketorolac + one tablet metoclopramide + one tablet benadryl), Starting Mon 5/23/2022, Normal      lidocaine (XYLOCAINE) 2 % topical gel Apply topically as needed for mild pain, Starting Mon 4/18/2022, Normal      lubiprostone (AMITIZA) 24 mcg capsule Take 1 capsule (24 mcg total) by mouth 2 (two) times a day with meals, Starting Fri 7/15/2022, Normal      meloxicam (MOBIC) 15 mg tablet TAKE 1 TABLET(15 MG) BY MOUTH DAILY, Normal      !! nortriptyline (PAMELOR) 25 mg capsule Take 1 capsule (25 mg total) by mouth daily at bedtime, Starting Tue 8/2/2022, Normal      !! nortriptyline (PAMELOR) 75 MG capsule Take 1 capsule (75 mg total) by mouth daily at bedtime, Starting Tue 8/2/2022, Normal      omeprazole (PriLOSEC) 20 mg delayed release capsule Take 1 capsule (20 mg total) by mouth in the morning , Starting Thu 5/19/2022, Normal      Rimegepant Sulfate (Nurtec) 75 MG TBDP Take 75 mg by mouth as needed (migraine) No more than one dose in 24 hours, Starting Mon 5/23/2022, Normal       !! - Potential duplicate medications found  Please discuss with provider  No discharge procedures on file      PDMP Review       Value Time User    PDMP Reviewed  Yes 10/1/2020 12:38 AM Amelia Ontiveros DO          ED Provider  Electronically Signed by           Lynsey Hope PA-C  08/26/22 0002

## 2022-08-30 DIAGNOSIS — K59.00 CONSTIPATION, UNSPECIFIED CONSTIPATION TYPE: ICD-10-CM

## 2022-08-30 DIAGNOSIS — E55.9 VITAMIN D DEFICIENCY: ICD-10-CM

## 2022-08-30 RX ORDER — LUBIPROSTONE 24 UG/1
CAPSULE, GELATIN COATED ORAL
Qty: 60 CAPSULE | Refills: 0 | Status: SHIPPED | OUTPATIENT
Start: 2022-08-30 | End: 2022-09-28

## 2022-08-30 RX ORDER — ERGOCALCIFEROL 1.25 MG/1
CAPSULE ORAL
Qty: 16 CAPSULE | Refills: 0 | Status: SHIPPED | OUTPATIENT
Start: 2022-08-30 | End: 2022-09-16

## 2022-08-31 ENCOUNTER — HOSPITAL ENCOUNTER (EMERGENCY)
Facility: HOSPITAL | Age: 32
Discharge: HOME | End: 2022-08-31
Attending: EMERGENCY MEDICINE
Payer: COMMERCIAL

## 2022-08-31 VITALS
TEMPERATURE: 98.5 F | BODY MASS INDEX: 28.59 KG/M2 | HEART RATE: 73 BPM | OXYGEN SATURATION: 100 % | RESPIRATION RATE: 18 BRPM | SYSTOLIC BLOOD PRESSURE: 100 MMHG | DIASTOLIC BLOOD PRESSURE: 86 MMHG | WEIGHT: 141.54 LBS

## 2022-08-31 DIAGNOSIS — Z51.89 VISIT FOR WOUND CHECK: Primary | ICD-10-CM

## 2022-08-31 PROCEDURE — 99281 EMR DPT VST MAYX REQ PHY/QHP: CPT | Performed by: EMERGENCY MEDICINE

## 2022-08-31 PROCEDURE — 99282 EMERGENCY DEPT VISIT SF MDM: CPT

## 2022-08-31 NOTE — ED PROVIDER NOTES
History  Chief Complaint   Patient presents with    Wound Check     Pt states "I'm not sure if it's time for the stitches to come out (from back of left hand), but I wanted them to check it, [employer] work won't let me go back till I'm cleared by a doctor "     32 yo F here with request for wound check as she would like to return to work  Note no drainage or pain, wants to see if stitched can be removed      History provided by:  Patient  Wound Check   She was treated in the ED 3 to 5 days ago  Previous treatment in the ED includes laceration repair  There has been no drainage from the wound  There is no redness present  There is no swelling present  There is no pain present  She has no difficulty moving the affected extremity or digit  Prior to Admission Medications   Prescriptions Last Dose Informant Patient Reported? Taking?    Amitiza 24 MCG capsule   No No   Sig: TAKE 1 CAPSULE(24 MCG) BY MOUTH TWICE DAILY WITH MEALS   Cholecalciferol (Vitamin D3) 50 MCG (2000 UT) TABS   Yes No   Sig: Take 2,000 Units by mouth daily   Patient not taking: No sig reported   Diclofenac Sodium (VOLTAREN) 1 %   No No   Sig: Apply 2 g topically 4 (four) times a day   Patient not taking: No sig reported   Rimegepant Sulfate (Nurtec) 75 MG TBDP   No No   Sig: Take 75 mg by mouth as needed (migraine) No more than one dose in 24 hours   cyproheptadine (PERIACTIN) 4 mg tablet   No No   Sig: Take 2 tablets at night   Patient not taking: No sig reported   doxycycline (ADOXA) 100 MG tablet   No No   Sig: Take 1 tablet (100 mg total) by mouth 2 (two) times a day for 28 days   ergocalciferol (VITAMIN D2) 50,000 units   No No   Sig: TAKE 1 CAPSULE BY MOUTH ONCE A WEEK   ibuprofen (MOTRIN) 600 mg tablet   No No   Sig: Take 1 tablet (600 mg total) by mouth every 8 (eight) hours as needed for mild pain or moderate pain   Patient not taking: No sig reported   ketorolac (TORADOL) 10 mg tablet   No No   Sig: Take 1 tablet (10 mg total) by mouth every 8 (eight) hours as needed for moderate pain or severe pain (migraine cocktail - one tablet ketorolac + one tablet metoclopramide + one tablet benadryl)   lidocaine (XYLOCAINE) 2 % topical gel   No No   Sig: Apply topically as needed for mild pain   Patient not taking: No sig reported   meloxicam (MOBIC) 15 mg tablet   No No   Sig: TAKE 1 TABLET(15 MG) BY MOUTH DAILY   nortriptyline (PAMELOR) 25 mg capsule   No No   Sig: Take 1 capsule (25 mg total) by mouth daily at bedtime   nortriptyline (PAMELOR) 75 MG capsule   No No   Sig: Take 1 capsule (75 mg total) by mouth daily at bedtime   omeprazole (PriLOSEC) 20 mg delayed release capsule   No No   Sig: Take 1 capsule (20 mg total) by mouth in the morning  Patient not taking: No sig reported      Facility-Administered Medications: None       Past Medical History:   Diagnosis Date    Frequent headaches     Lyme disease        Past Surgical History:   Procedure Laterality Date    WISDOM TOOTH EXTRACTION         Family History   Problem Relation Age of Onset    No Known Problems Mother     No Known Problems Father      I have reviewed and agree with the history as documented  E-Cigarette/Vaping    E-Cigarette Use Never User      E-Cigarette/Vaping Substances    Nicotine No     THC No     CBD No     Flavoring No     Other No     Unknown No      Social History     Tobacco Use    Smoking status: Former Smoker     Packs/day: 0 00     Types: Cigarettes    Smokeless tobacco: Never Used    Tobacco comment: Quit Oct 2021   Vaping Use    Vaping Use: Never used   Substance Use Topics    Alcohol use: Not Currently    Drug use: Not Currently       Review of Systems   Constitutional: Negative for chills and fever  HENT: Negative for ear pain and sore throat  Eyes: Negative for pain and visual disturbance  Respiratory: Negative for cough and shortness of breath  Cardiovascular: Negative for chest pain and palpitations     Gastrointestinal: Negative for abdominal pain and vomiting  Genitourinary: Negative for dysuria and hematuria  Musculoskeletal: Negative for arthralgias and back pain  Skin: Negative for color change and rash  Neurological: Negative for seizures and syncope  All other systems reviewed and are negative  Physical Exam  Physical Exam  Vitals and nursing note reviewed  Constitutional:       General: She is not in acute distress  Appearance: She is well-developed  HENT:      Head: Normocephalic and atraumatic  Eyes:      Conjunctiva/sclera: Conjunctivae normal    Cardiovascular:      Rate and Rhythm: Normal rate and regular rhythm  Heart sounds: No murmur heard  Pulmonary:      Effort: Pulmonary effort is normal  No respiratory distress  Breath sounds: Normal breath sounds  Abdominal:      Palpations: Abdomen is soft  Tenderness: There is no abdominal tenderness  Musculoskeletal:      Cervical back: Neck supple  Comments: Right 2nd MTP joint extensor surface laceration clean dry intact but no significant healing   Skin:     General: Skin is warm and dry  Neurological:      Mental Status: She is alert           Vital Signs  ED Triage Vitals [08/31/22 1836]   Temperature Pulse Respirations Blood Pressure SpO2   98 5 °F (36 9 °C) 73 18 100/86 100 %      Temp Source Heart Rate Source Patient Position - Orthostatic VS BP Location FiO2 (%)   Oral Monitor Sitting Left arm --      Pain Score       --           Vitals:    08/31/22 1836   BP: 100/86   Pulse: 73   Patient Position - Orthostatic VS: Sitting         Visual Acuity      ED Medications  Medications - No data to display    Diagnostic Studies  Results Reviewed     None                 No orders to display              Procedures  Procedures         ED Course                                             MDM  Number of Diagnoses or Management Options  Diagnosis management comments: Sutures not ready to remove, needs 1 more week of healing before removal as area of tension      Disposition  Final diagnoses:   Visit for wound check     Time reflects when diagnosis was documented in both MDM as applicable and the Disposition within this note     Time User Action Codes Description Comment    8/31/2022  6:50 PM Mandi Cole Add [Z51 89] Visit for wound check       ED Disposition     ED Disposition   Discharge    Condition   Stable    Date/Time   Wed Aug 31, 2022  6:50 PM    Comment   y [address] discharge to home/self care  Follow-up Information     Follow up With Specialties Details Why Contact Info    Sg Pat, [address] Nurse Practitioner   [id number]  [address] [facility]  [place] [address] [place] [address]  [phone]            Patient's Medications   Discharge Prescriptions    No medications on file       No discharge procedures on file      PDMP Review       Value Time User    PDMP Reviewed  Yes 10/1/2020 12:38 AM Pili Noland DO          ED Provider  Electronically Signed by           Sarah Bee MD  08/31/22 [id number]

## 2022-08-31 NOTE — Clinical Note
Kenney Douglas was seen and treated in our emergency department on 8/31/2022  Diagnosis:     Iliana Garcia  may return to work on return date  She may return on this date: 09/01/2022    May return to work with light duty, return to normal duty in 1 week  If you have any questions or concerns, please don't hesitate to call        Ruben Hughes MD    ______________________________           _______________          _______________  Hospital Representative                              Date                                Time

## 2022-09-16 ENCOUNTER — OFFICE VISIT (OUTPATIENT)
Dept: OBGYN CLINIC | Facility: CLINIC | Age: 32
End: 2022-09-16

## 2022-09-16 VITALS
SYSTOLIC BLOOD PRESSURE: 103 MMHG | BODY MASS INDEX: 30.42 KG/M2 | WEIGHT: 141 LBS | HEIGHT: 57 IN | HEART RATE: 77 BPM | DIASTOLIC BLOOD PRESSURE: 74 MMHG

## 2022-09-16 DIAGNOSIS — N89.8 VAGINAL DISCHARGE: Primary | ICD-10-CM

## 2022-09-16 DIAGNOSIS — N89.8 VAGINAL ODOR: ICD-10-CM

## 2022-09-16 DIAGNOSIS — N76.0 BV (BACTERIAL VAGINOSIS): ICD-10-CM

## 2022-09-16 DIAGNOSIS — B96.89 BV (BACTERIAL VAGINOSIS): ICD-10-CM

## 2022-09-16 LAB
BV WHIFF TEST VAG QL: ABNORMAL
CLUE CELLS SPEC QL WET PREP: ABNORMAL
PH SMN: 5 [PH]
SL AMB POCT WET MOUNT: ABNORMAL
T VAGINALIS VAG QL WET PREP: ABNORMAL
YEAST VAG QL WET PREP: ABNORMAL

## 2022-09-16 PROCEDURE — 87210 SMEAR WET MOUNT SALINE/INK: CPT | Performed by: NURSE PRACTITIONER

## 2022-09-16 PROCEDURE — 99213 OFFICE O/P EST LOW 20 MIN: CPT | Performed by: NURSE PRACTITIONER

## 2022-09-16 RX ORDER — DESOGESTREL AND ETHINYL ESTRADIOL 0.15-0.03
KIT ORAL
COMMUNITY
Start: 2022-09-12 | End: 2022-10-08 | Stop reason: SDUPTHER

## 2022-09-16 RX ORDER — METRONIDAZOLE 500 MG/1
500 TABLET ORAL EVERY 12 HOURS SCHEDULED
Qty: 14 TABLET | Refills: 0 | Status: SHIPPED | OUTPATIENT
Start: 2022-09-16 | End: 2022-09-23

## 2022-09-16 NOTE — PROGRESS NOTES
PROBLEM GYNECOLOGICAL VISIT    Margarito Nix is a 33 y o  female who presents today with complaint of vaginal discharge and odor  Her general medical history has been reviewed and she reports it as follows:    Past Medical History:   Diagnosis Date    Lyme disease     Migraines      Past Surgical History:   Procedure Laterality Date    WISDOM TOOTH EXTRACTION       OB History        1    Para   1    Term   1       0    AB   0    Living   1       SAB   0    IAB   0    Ectopic   0    Multiple   0    Live Births   1               Social History     Tobacco Use    Smoking status: Former Smoker     Types: Cigarettes     Quit date: 10/2021     Years since quittin 9    Smokeless tobacco: Never Used   Vaping Use    Vaping Use: Some days    Substances: Nicotine   Substance Use Topics    Alcohol use: Yes     Comment: couple times/month    Drug use: Never     Social History     Substance and Sexual Activity   Sexual Activity Yes    Partners: Male    Birth control/protection: OCP       Current Outpatient Medications   Medication Instructions    Amitiza 24 MCG capsule TAKE 1 CAPSULE(24 MCG) BY MOUTH TWICE DAILY WITH MEALS    Isibloom 0 15-30 MG-MCG per tablet No dose, route, or frequency recorded   nortriptyline (PAMELOR) 75 mg, Oral, Daily at bedtime    nortriptyline (PAMELOR) 25 mg, Oral, Daily at bedtime       History of Present Illness:   Reports she has been experiencing vaginal discharge and odor for the past 1-2 weeks  Denies pelvic pain or dysuria  Review of Systems:  Review of Systems   Constitutional: Negative  Gastrointestinal: Negative  Genitourinary: Positive for vaginal discharge  Negative for dysuria, menstrual problem, pelvic pain and vaginal pain         Physical Exam:  /74   Pulse 77   Ht 4' 9" (1 448 m)   Wt 64 kg (141 lb)   LMP 2022   BMI 30 51 kg/m²   Physical Exam  Constitutional:       General: She is not in acute distress  Neurological:      Mental Status: She is alert  Skin:     General: Skin is warm and dry  Vitals reviewed  Point of Care Testing:   -Wet mount: + clue cells, no trichomonads, moderate WBC's, pH=5 0   -KOH mount: no hyphae   -Whiff: positive    Assessment:   1  BV  Plan:   1  Given Rx Flagyl  2  Reviewed proper vaginal hygiene     3  Return to office in 2 weeks for annual GYN exam

## 2022-09-26 ENCOUNTER — OFFICE VISIT (OUTPATIENT)
Dept: URGENT CARE | Age: 32
End: 2022-09-26
Payer: COMMERCIAL

## 2022-09-26 VITALS
OXYGEN SATURATION: 99 % | WEIGHT: 143.2 LBS | HEART RATE: 79 BPM | RESPIRATION RATE: 18 BRPM | TEMPERATURE: 99 F | BODY MASS INDEX: 30.99 KG/M2

## 2022-09-26 DIAGNOSIS — J02.9 SORE THROAT: Primary | ICD-10-CM

## 2022-09-26 PROCEDURE — 99213 OFFICE O/P EST LOW 20 MIN: CPT | Performed by: PHYSICIAN ASSISTANT

## 2022-09-26 PROCEDURE — 87880 STREP A ASSAY W/OPTIC: CPT | Performed by: PHYSICIAN ASSISTANT

## 2022-09-26 RX ORDER — CETIRIZINE HYDROCHLORIDE 10 MG/1
10 TABLET ORAL DAILY
Qty: 30 TABLET | Refills: 0 | Status: SHIPPED | OUTPATIENT
Start: 2022-09-26 | End: 2022-09-27

## 2022-09-26 RX ORDER — FLUTICASONE PROPIONATE 50 MCG
1 SPRAY, SUSPENSION (ML) NASAL DAILY
Qty: 9.9 ML | Refills: 0 | Status: SHIPPED | OUTPATIENT
Start: 2022-09-26

## 2022-09-26 NOTE — PATIENT INSTRUCTIONS
Use Flonase and Zyrtec as prescribed  Use saltwater gargles as needed  Over-the-counter throat lozenges as needed  If symptoms do not improve in next 3-5 days follow-up with PCP  If symptoms worsen or new symptoms develop such as difficulty swallowing, changes in voice, or neck pain reports emergency room immediately

## 2022-09-26 NOTE — PROGRESS NOTES
[facility]        NAME: Cat Pacheco is a 33 y o  female  : 1990    MRN: [id number]  DATE: 2022  TIME: 8:51 PM    Assessment and Plan   Sore throat [J02 9]  1  Sore throat  POCT rapid strepA    fluticasone (FLONASE) 50 mcg/act nasal spray    cetirizine (ZyrTEC) 10 mg tablet   Patient presents with complaint of swelling to the right tonsil  It is slightly enlarged compared to the left side there is no cervical lymphadenopathy  Strep testing offered today and patient would like to proceed  Rapid strep in clinic is negative do not recommend sending for culture at this time  Patient is provided with a prescription for Flonase and Zyrtec to treat urge symptoms  We also discussed over-the-counter lozenges in salt water gargles  She will follow-up with her primary care doctor in 3-5 days if symptoms do not improve  We discussed symptoms of tonsillar, peritonsillar, and retropharyngeal abscesses and when to report to the emergency room  Patient Instructions     Patient Instructions   Use Flonase and Zyrtec as prescribed  Use saltwater gargles as needed  Over-the-counter throat lozenges as needed  If symptoms do not improve in next 3-5 days follow-up with PCP  If symptoms worsen or new symptoms develop such as difficulty swallowing, changes in voice, or neck pain reports emergency room immediately  Follow up with PCP in 3-5 days  Proceed to  ER if symptoms worsen  Chief Complaint     Chief Complaint   Patient presents with    Sore Throat     Right sided Tonsil pain when she swallows, started today          History of Present Illness       16-year-old female presents with complaint of tonsillar swelling  Patient reports that she feels like she is having pain and notes that things feel like they are getting stuck on her right tonsil only when swallowing    She denies any fevers, chills, cough, chest pain, shortness a breath, nausea, vomiting, diarrhea, loss taste cause, loss of smell, myalgias and fatigue  Patient denies any recent sick contacts  She believes she might have seasonal allergies she states that she tosses and turns at night when she sleeps with mostly sleeps on her left side  Denies any recent weight loss or other changes in her weight  She has no other concerns or complaints today  Review of Systems   Review of Systems   Constitutional: Negative for chills, fatigue and fever  HENT: Positive for sore throat  Negative for congestion, postnasal drip, rhinorrhea, trouble swallowing and voice change  Respiratory: Negative for cough and shortness of breath  Cardiovascular: Negative for chest pain  Gastrointestinal: Negative for diarrhea, nausea and vomiting  Musculoskeletal: Negative for myalgias  Neurological: Negative for headaches           Current Medications       Current Outpatient Medications:     Amitiza 24 MCG capsule, TAKE 1 CAPSULE(24 MCG) BY MOUTH TWICE DAILY WITH MEALS, Disp: 60 capsule, Rfl: 0    cetirizine (ZyrTEC) 10 mg tablet, Take 1 tablet (10 mg total) by mouth daily, Disp: 30 tablet, Rfl: 0    fluticasone (FLONASE) 50 mcg/act nasal spray, 1 spray into each nostril daily, Disp: 9 9 mL, Rfl: 0    Isibloom 0 15-30 MG-MCG per tablet, , Disp: , Rfl:     nortriptyline (PAMELOR) 25 mg capsule, Take 1 capsule (25 mg total) by mouth daily at bedtime (Patient not taking: Reported on 9/26/2022), Disp: 90 capsule, Rfl: 0    nortriptyline (PAMELOR) 75 MG capsule, Take 1 capsule (75 mg total) by mouth daily at bedtime (Patient not taking: Reported on 9/26/2022), Disp: 90 capsule, Rfl: 0    Current Allergies     Allergies as of 09/26/2022 - Reviewed 09/26/2022   Allergen Reaction Noted    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/2019            The following portions of the patient's history were reviewed and updated as appropriate: allergies, current medications, past family history, past medical history, past social history, past surgical history and problem list      Past Medical History:   Diagnosis Date    Lyme disease 2020    Migraines 2020       Past Surgical History:   Procedure Laterality Date    WISDOM TOOTH EXTRACTION         Family History   Problem Relation Age of Onset    Heart attack Mother     Hypertension Mother     Cancer Mother         leukemia    No Known Problems Father     Hypertension Maternal Grandmother     Diabetes Maternal Grandmother     Breast cancer Neg Hx     Colon cancer Neg Hx     Ovarian cancer Neg Hx          Medications have been verified  Objective   Pulse 79   Temp 99 °F (37 2 °C)   Resp 18   Wt 65 kg (143 lb 3 2 oz)   LMP 09/08/2022   SpO2 99%   BMI 30 99 kg/m²   Patient's last menstrual period was 09/08/2022  Physical Exam     Physical Exam  Vitals and nursing note reviewed  Constitutional:       General: She is not in acute distress  Appearance: Normal appearance  She is not ill-appearing or toxic-appearing  HENT:      Head: Normocephalic and atraumatic  Jaw: No trismus  Right Ear: Tympanic membrane, ear canal and external ear normal  There is no impacted cerumen  No foreign body  Left Ear: Tympanic membrane, ear canal and external ear normal  There is no impacted cerumen  No foreign body  Nose: No nasal deformity, mucosal edema, congestion or rhinorrhea  Right Nostril: No foreign body, epistaxis or occlusion  Left Nostril: No foreign body, epistaxis or occlusion  Right Turbinates: Not enlarged, swollen or pale  Left Turbinates: Not enlarged, swollen or pale  Mouth/Throat:      Lips: Pink  No lesions  Mouth: Mucous membranes are moist  No injury, oral lesions or angioedema  Dentition: Normal dentition  Tongue: No lesions  Tongue does not deviate from midline  Palate: No mass and lesions  Pharynx: Uvula midline   No pharyngeal swelling, oropharyngeal exudate, posterior oropharyngeal erythema or uvula swelling  Tonsils: No tonsillar exudate or tonsillar abscesses  2+ on the right  1+ on the left  Eyes:      General: Lids are normal  Gaze aligned appropriately  No allergic shiner  Extraocular Movements: Extraocular movements intact  Cardiovascular:      Rate and Rhythm: Normal rate  Pulmonary:      Effort: Pulmonary effort is normal       Comments: Patient speaking in full sentences with no increased respiratory effort  No audible wheezing or stridor  Lymphadenopathy:      Cervical: No cervical adenopathy  Skin:     General: Skin is warm and dry  Neurological:      Mental Status: She is alert and oriented to person, place, and time  Coordination: Coordination is intact  Gait: Gait is intact  Psychiatric:         Attention and Perception: Attention and perception normal          Mood and Affect: Mood and affect normal          Speech: Speech normal          Behavior: Behavior is cooperative  Note: Portions of this record may have been created with voice recognition software  Occasional wrong word or "sound a like" substitutions may have occurred due to the inherent limitations of voice recognition software  Please read the chart carefully and recognize, using context, where substitutions have occurred  *

## 2022-09-27 LAB — S PYO AG THROAT QL: NEGATIVE

## 2022-09-28 DIAGNOSIS — K59.00 CONSTIPATION, UNSPECIFIED CONSTIPATION TYPE: Primary | ICD-10-CM

## 2022-09-28 DIAGNOSIS — M13.0: Primary | ICD-10-CM

## 2022-09-28 DIAGNOSIS — M13.0: ICD-10-CM

## 2022-09-28 RX ORDER — MELOXICAM 15 MG/1
TABLET ORAL
Qty: 90 TABLET | Refills: 1 | Status: SHIPPED | OUTPATIENT
Start: 2022-09-28

## 2022-09-28 RX ORDER — LINACLOTIDE 290 UG/1
290 CAPSULE, GELATIN COATED ORAL DAILY
Qty: 30 CAPSULE | Refills: 3 | Status: SHIPPED | OUTPATIENT
Start: 2022-09-28

## 2022-09-28 RX ORDER — MELOXICAM 15 MG/1
15 TABLET ORAL DAILY
Qty: 30 TABLET | Refills: 3 | Status: SHIPPED | OUTPATIENT
Start: 2022-09-28 | End: 2022-09-28

## 2022-10-08 ENCOUNTER — ANNUAL EXAM (OUTPATIENT)
Dept: OBGYN CLINIC | Facility: CLINIC | Age: 32
End: 2022-10-08

## 2022-10-08 ENCOUNTER — APPOINTMENT (OUTPATIENT)
Dept: LAB | Facility: HOSPITAL | Age: 32
End: 2022-10-08
Attending: NURSE PRACTITIONER
Payer: COMMERCIAL

## 2022-10-08 VITALS
DIASTOLIC BLOOD PRESSURE: 79 MMHG | WEIGHT: 141 LBS | BODY MASS INDEX: 30.42 KG/M2 | SYSTOLIC BLOOD PRESSURE: 119 MMHG | HEIGHT: 57 IN | HEART RATE: 80 BPM

## 2022-10-08 DIAGNOSIS — Z12.4 SCREENING FOR CERVICAL CANCER: ICD-10-CM

## 2022-10-08 DIAGNOSIS — N89.8 VAGINAL DISCHARGE: ICD-10-CM

## 2022-10-08 DIAGNOSIS — N89.8 VAGINAL ODOR: ICD-10-CM

## 2022-10-08 DIAGNOSIS — Z01.411 ENCOUNTER FOR GYNECOLOGICAL EXAMINATION WITH ABNORMAL FINDING: ICD-10-CM

## 2022-10-08 DIAGNOSIS — Z11.3 SCREEN FOR STD (SEXUALLY TRANSMITTED DISEASE): Primary | ICD-10-CM

## 2022-10-08 DIAGNOSIS — Z12.39 ENCOUNTER FOR BREAST CANCER SCREENING USING NON-MAMMOGRAM MODALITY: ICD-10-CM

## 2022-10-08 DIAGNOSIS — Z11.3 SCREEN FOR STD (SEXUALLY TRANSMITTED DISEASE): ICD-10-CM

## 2022-10-08 DIAGNOSIS — Z30.09 UNWANTED FERTILITY: ICD-10-CM

## 2022-10-08 DIAGNOSIS — B96.89 BV (BACTERIAL VAGINOSIS): ICD-10-CM

## 2022-10-08 DIAGNOSIS — N76.0 RECURRENT VAGINITIS: ICD-10-CM

## 2022-10-08 DIAGNOSIS — N76.0 BV (BACTERIAL VAGINOSIS): ICD-10-CM

## 2022-10-08 LAB
BV WHIFF TEST VAG QL: ABNORMAL
CLUE CELLS SPEC QL WET PREP: ABNORMAL
HBV SURFACE AG SER QL: NORMAL
HCV AB SER QL: NORMAL
PH SMN: 5 [PH]
SL AMB POCT WET MOUNT: ABNORMAL
T VAGINALIS VAG QL WET PREP: ABNORMAL
YEAST VAG QL WET PREP: ABNORMAL

## 2022-10-08 PROCEDURE — 87591 N.GONORRHOEAE DNA AMP PROB: CPT | Performed by: NURSE PRACTITIONER

## 2022-10-08 PROCEDURE — G0145 SCR C/V CYTO,THINLAYER,RESCR: HCPCS | Performed by: NURSE PRACTITIONER

## 2022-10-08 PROCEDURE — 87491 CHLMYD TRACH DNA AMP PROBE: CPT | Performed by: NURSE PRACTITIONER

## 2022-10-08 PROCEDURE — G0476 HPV COMBO ASSAY CA SCREEN: HCPCS | Performed by: NURSE PRACTITIONER

## 2022-10-08 PROCEDURE — 87340 HEPATITIS B SURFACE AG IA: CPT

## 2022-10-08 PROCEDURE — 86592 SYPHILIS TEST NON-TREP QUAL: CPT

## 2022-10-08 PROCEDURE — 36415 COLL VENOUS BLD VENIPUNCTURE: CPT

## 2022-10-08 PROCEDURE — 99395 PREV VISIT EST AGE 18-39: CPT | Performed by: NURSE PRACTITIONER

## 2022-10-08 PROCEDURE — 86803 HEPATITIS C AB TEST: CPT

## 2022-10-08 PROCEDURE — 87389 HIV-1 AG W/HIV-1&-2 AB AG IA: CPT

## 2022-10-08 PROCEDURE — 87210 SMEAR WET MOUNT SALINE/INK: CPT | Performed by: NURSE PRACTITIONER

## 2022-10-08 RX ORDER — DESOGESTREL AND ETHINYL ESTRADIOL 0.15-0.03
1 KIT ORAL DAILY
Qty: 28 TABLET | Refills: 12 | Status: SHIPPED | OUTPATIENT
Start: 2022-10-08

## 2022-10-08 RX ORDER — METRONIDAZOLE 500 MG/1
500 TABLET ORAL EVERY 12 HOURS SCHEDULED
Qty: 28 TABLET | Refills: 0 | Status: SHIPPED | OUTPATIENT
Start: 2022-10-08 | End: 2022-10-22

## 2022-10-08 NOTE — LETTER
10/10/2022    To Kinsey Funez  : 1990      This letter is to advise you that your recent BLOODWORK for Sexually-Transmitted Diseases (HIV, hepatitis B, hepatitis C, and syphilis) results were reviewed by me and are NORMAL  Please contact our office for an appointment if you have any additional concerns      Orpha Heads

## 2022-10-08 NOTE — LETTER
10/17/2022    To Margarito Nix  : 1990      This letter is to advise you that your recent PAP SMEAR results were reviewed by me and are NORMAL    We will see you in 1 year for your annual exam     Bridgett Church

## 2022-10-08 NOTE — LETTER
10/11/2022    To Rosario LANZA: 1990      This letter is to advise you that your recent CULTURES for gonorrhea and chlamydia were reviewed by me and are NORMAL  Please contact the office for an appointment if you have any additional concerns      Bossman Caul

## 2022-10-08 NOTE — PROGRESS NOTES
Charito Burns is a 33 y o  female who presents today for annual GYN exam   Her last pap smear was performed 3/6/2019 and result was NILM  She reports no history of abnormal pap smears in her past   She reports menses as regular  Patient's last menstrual period was 2022 (exact date)  Her general medical history has been reviewed and she reports it as follows:    Past Medical History:   Diagnosis Date    Lyme disease     Migraines      Past Surgical History:   Procedure Laterality Date    WISDOM TOOTH EXTRACTION       OB History        1    Para   1    Term   1       0    AB   0    Living   1       SAB   0    IAB   0    Ectopic   0    Multiple   0    Live Births   1               Social History     Tobacco Use    Smoking status: Former Smoker     Types: Cigarettes     Quit date: 10/2021     Years since quittin 0    Smokeless tobacco: Never Used   Vaping Use    Vaping Use: Some days    Substances: Nicotine   Substance Use Topics    Alcohol use: Yes     Comment: couple times/month    Drug use: Never     Social History     Substance and Sexual Activity   Sexual Activity Yes    Partners: Male    Birth control/protection: OCP     Cancer-related family history includes Cancer in her mother  There is no history of Breast cancer, Colon cancer, or Ovarian cancer  Current Outpatient Medications   Medication Instructions    cetirizine (ZyrTEC) 10 mg tablet TAKE 1 TABLET BY MOUTH DAILY    fluticasone (FLONASE) 50 mcg/act nasal spray 1 spray, Nasal, Daily    Isibloom 0 15-30 MG-MCG per tablet No dose, route, or frequency recorded   linaCLOtide (Linzess) 290 MCG CAPS 1 capsule, Oral, Daily    meloxicam (MOBIC) 15 mg tablet TAKE 1 TABLET(15 MG) BY MOUTH DAILY       Review of Systems:  Review of Systems   Constitutional: Negative  Gastrointestinal: Negative  Genitourinary: Positive for vaginal discharge   Negative for difficulty urinating, menstrual problem and pelvic pain  Was tx for BV 2 weeks ago and reports that symptoms of vaginal discharge and odor improved while taking PO Flagyl, but returned again after completion of course   Skin: Negative  Physical Exam:  /79   Pulse 80   Ht 4' 9" (1 448 m)   Wt 64 kg (141 lb)   LMP 09/08/2022 (Exact Date)   BMI 30 51 kg/m²   Physical Exam  Constitutional:       General: She is not in acute distress  Appearance: She is well-developed  Genitourinary:      Vulva normal       No lesions in the vagina  Vaginal discharge present  Right Adnexa: not tender and no mass present  Left Adnexa: not tender and no mass present  No cervical motion tenderness or lesion  Uterus is not tender  Breasts:      Right: No mass, nipple discharge, skin change or tenderness  Left: No mass, nipple discharge, skin change or tenderness  Neck:      Thyroid: No thyromegaly  Cardiovascular:      Rate and Rhythm: Normal rate and regular rhythm  Pulmonary:      Effort: Pulmonary effort is normal    Abdominal:      Palpations: Abdomen is soft  Tenderness: There is no abdominal tenderness  Musculoskeletal:      Cervical back: Neck supple  Neurological:      Mental Status: She is alert and oriented to person, place, and time  Skin:     General: Skin is warm and dry  Vitals reviewed  Point of Care Testing:   -Wet mount: + clue cells, no trichomonads, moderate WBC's, pH=5 0   -KOH mount: no hyphae   -Whiff: positive    Assessment/Plan:   1  Abnormal well-woman GYN exam   2  Cervical cancer screening:  Normal cervical exam   Pap smear done with HPV co-testing  3  STD screening:  Orders placed for vaginal GC/CT cultures  Orders placed for serum anti-HIV, anti-HCV, HbsAg, RPR    4  Breast cancer screening:  Normal breast exam   Reviewed breast self-awareness     5  Depression Screening: Patient's depression screening was assessed with a PHQ-2 score of 0  Their PHQ-9 score was 1  Clinically patient does not have depression  No treatment is required  6  BMI Counseling: Body mass index is 30 51 kg/m²  Discussed the patient's BMI with her  The BMI is above normal  Patient referred to PCP due to patient being obese  7  Contraception:  OCP's  Rx refills for another year sent to pharmacy  8  Recurrent BV:  Given Rx Flagyl for 14 day course  9  Return to office in 1 year for annual GYN exam     Reviewed with patient that test results are available in [address] immediately, but that they will not necessarily be reviewed by me immediately  Explained that I will review results at my earliest opportunity and contact patient appropriately

## 2022-10-08 NOTE — PATIENT INSTRUCTIONS
Thank you for your confidence in our team    We appreciate you and welcome your feedback  If you receive a survey from us, please take a few moments to let us know how we are doing  Sincerely,  Alexsandra Faust       OBESITY     Obesity is defined as a body mass index (BMI) which is greater than 30  Your Body mass index is 30 51 kg/m²       The risks of obesity include  many health problems, such as injuries or physical disability  You may need tests to check for the following:  Diabetes     High blood pressure or high cholesterol     Heart disease     Gallbladder or liver disease     Cancer of the colon, breast, prostate, liver, or kidney     Sleep apnea     Arthritis or gout    Seek care immediately if:   You have a severe headache, confusion, or difficulty speaking  You have weakness on one side of your body  You have chest pain, sweating, or shortness of breath  Contact your healthcare provider if:   You have symptoms of gallbladder or liver disease, such as pain in your upper abdomen  You have knee or hip pain and discomfort while walking  You have symptoms of diabetes, such as intense hunger and thirst, and frequent urination  You have symptoms of sleep apnea, such as snoring or daytime sleepiness  You have questions or concerns about your condition or care  Treatment for obesity  focuses on helping you lose weight to improve your health  Even a small decrease in BMI can reduce the risk for many health problems  Your healthcare provider will help you set a weight-loss goal   Lifestyle changes  are the first step in treating obesity  These include making healthy food choices and getting regular physical activity  Your healthcare provider may suggest a weight-loss program that involves coaching, education, and therapy  Medicine  may help you lose weight when it is used with a healthy diet and physical activity       Surgery  can help you lose weight if you are very obese and have other health problems  There are several types of weight-loss surgery  Ask your healthcare provider for more information  Be successful losing weight:   Set small, realistic goals  An example of a small goal is to walk for 20 minutes 5 days a week  Malina goal is to lose 5% of your body weight  Tell friends, family members, and coworkers about your goals  and ask for their support  Ask a friend to lose weight with you, or join a weight-loss support group  Identify foods or triggers that may cause you to overeat , and find ways to avoid them  Remove tempting high-calorie foods from your home and workplace  Place a bowl of fresh fruit on your kitchen counter  If stress causes you to eat, then find other ways to cope with stress  Keep a diary to track what you eat and drink  Also write down how many minutes of physical activity you do each day  Weigh yourself once a week and record it in your diary  Eating changes: You will need to eat 500 to 1,000 fewer calories each day than you currently eat to lose 1 to 2 pounds a week  The following changes will help you cut calories:  Eat smaller portions  Use small plates, no larger than 9 inches in diameter  Fill your plate half full of fruits and vegetables  Measure your food using measuring cups until you know what a serving size looks like  Eat 3 meals and 1 or 2 snacks each day  Plan your meals in advance  Tracie Atwood and eat at home most of the time  Eat slowly  Eat fruits and vegetables at every meal   They are low in calories and high in fiber, which makes you feel full  Do not add butter, margarine, or cream sauce to vegetables  Use herbs to season steamed vegetables  Eat less fat and fewer fried foods  Eat more baked or grilled chicken and fish  These protein sources are lower in calories and fat than red meat  Limit fast food  Dress your salads with olive oil and vinegar instead of bottled dressing  Limit the amount of sugar you eat    Do not drink sugary beverages  Limit alcohol  Activity changes:  Physical activity is good for your body in many ways  It helps you burn calories and build strong muscles  It decreases stress and depression, and improves your mood  It can also help you sleep better  Talk to your healthcare provider before you begin an exercise program   Exercise for at least 30 minutes 5 days a week  Start slowly  Set aside time each day for physical activity that you enjoy and that is convenient for you  It is best to do both weight training and an activity that increases your heart rate, such as walking, bicycling, or swimming  Find ways to be more active  Do yard work and housecleaning  Walk up the stairs instead of using elevators  Spend your leisure time going to events that require walking, such as outdoor festivals or fairs  This extra physical activity can help you lose weight and keep it off  Follow up with your primary healthcare provider as directed  You may need to meet with a dietitian  Write down your questions so you remember to ask them during your visits

## 2022-10-09 LAB
HIV 1+2 AB+HIV1 P24 AG SERPL QL IA: NORMAL
RPR SER QL: NORMAL

## 2022-10-11 ENCOUNTER — HOSPITAL ENCOUNTER (EMERGENCY)
Facility: HOSPITAL | Age: 32
Discharge: HOME | End: 2022-10-11
Attending: EMERGENCY MEDICINE
Payer: COMMERCIAL

## 2022-10-11 VITALS
WEIGHT: 141 LBS | BODY MASS INDEX: 30.51 KG/M2 | HEART RATE: 85 BPM | SYSTOLIC BLOOD PRESSURE: 124 MMHG | DIASTOLIC BLOOD PRESSURE: 75 MMHG | RESPIRATION RATE: 16 BRPM | TEMPERATURE: 98.4 F | OXYGEN SATURATION: 99 %

## 2022-10-11 DIAGNOSIS — G43.909 MIGRAINE: Primary | ICD-10-CM

## 2022-10-11 LAB
C TRACH DNA SPEC QL NAA+PROBE: NEGATIVE
EXT PREG TEST URINE: NEGATIVE
EXT. CONTROL ED NAV: NORMAL
HPV HR 12 DNA CVX QL NAA+PROBE: NEGATIVE
HPV16 DNA CVX QL NAA+PROBE: NEGATIVE
HPV18 DNA CVX QL NAA+PROBE: NEGATIVE
N GONORRHOEA DNA SPEC QL NAA+PROBE: NEGATIVE

## 2022-10-11 PROCEDURE — 99283 EMERGENCY DEPT VISIT LOW MDM: CPT

## 2022-10-11 PROCEDURE — 96361 HYDRATE IV INFUSION ADD-ON: CPT

## 2022-10-11 PROCEDURE — 99284 EMERGENCY DEPT VISIT MOD MDM: CPT | Performed by: PHYSICIAN ASSISTANT

## 2022-10-11 PROCEDURE — 96374 THER/PROPH/DIAG INJ IV PUSH: CPT

## 2022-10-11 PROCEDURE — 96375 TX/PRO/DX INJ NEW DRUG ADDON: CPT

## 2022-10-11 PROCEDURE — 81025 URINE PREGNANCY TEST: CPT | Performed by: PHYSICIAN ASSISTANT

## 2022-10-11 RX ORDER — METOCLOPRAMIDE HYDROCHLORIDE 5 MG/ML
10 INJECTION INTRAMUSCULAR; INTRAVENOUS ONCE
Status: COMPLETED | OUTPATIENT
Start: 2022-10-11 | End: 2022-10-11

## 2022-10-11 RX ORDER — KETOROLAC TROMETHAMINE 30 MG/ML
15 INJECTION, SOLUTION INTRAMUSCULAR; INTRAVENOUS ONCE
Status: COMPLETED | OUTPATIENT
Start: 2022-10-11 | End: 2022-10-11

## 2022-10-11 RX ADMIN — SODIUM CHLORIDE 1000 ML: 0.9 INJECTION, SOLUTION INTRAVENOUS at 21:33

## 2022-10-11 RX ADMIN — METOCLOPRAMIDE 10 MG: 5 INJECTION, SOLUTION INTRAMUSCULAR; INTRAVENOUS at 21:33

## 2022-10-11 RX ADMIN — KETOROLAC TROMETHAMINE 15 MG: 30 INJECTION, SOLUTION INTRAMUSCULAR; INTRAVENOUS at 21:33

## 2022-10-12 NOTE — ED PROVIDER NOTES
History  Chief Complaint   Patient presents with   • Migraine     Pt came to ER c/o migraine for a couple of days  Pt reports an episode of emesis earlier today  Pt reports sensitivity to light  Pt reports dental pressure  Pt took ibuprofen at 1500 without relief  32 yo F with PMH lyme disease and migraines presenting for evaluation of migraine  Pt reports starting with migraine about 2 days ago which has been unrelieved by ibuprofen  Last dose of ibuprofen 3pm   Patient states that this feels like her previous migraines  She reports pain throughout her whole head  She reach out to her neurologist today directly who usually cause her in a migraine cocktail to take at home but her physician did not answer  Patient reports 1 episode of emesis earlier today  She does report photophobia  Denies any loss of vision or blurry vision  Denies neck pain  No numbness tingling or weakness in the upper lower extremities  Prior to Admission Medications   Prescriptions Last Dose Informant Patient Reported? Taking?    Isibloom 0 15-30 MG-MCG per tablet   No No   Sig: Take 1 tablet by mouth daily   cetirizine (ZyrTEC) 10 mg tablet   No No   Sig: TAKE 1 TABLET BY MOUTH DAILY   fluticasone (FLONASE) 50 mcg/act nasal spray   No No   Si spray into each nostril daily   linaCLOtide (Linzess) 290 MCG CAPS   No No   Sig: Take 1 capsule by mouth in the morning   meloxicam (MOBIC) 15 mg tablet   No No   Sig: TAKE 1 TABLET(15 MG) BY MOUTH DAILY   metroNIDAZOLE (FLAGYL) 500 mg tablet   No No   Sig: Take 1 tablet (500 mg total) by mouth every 12 (twelve) hours for 14 days      Facility-Administered Medications: None       Past Medical History:   Diagnosis Date   • Lyme disease    • Migraines        Past Surgical History:   Procedure Laterality Date   • WISDOM TOOTH EXTRACTION         Family History   Problem Relation Age of Onset   • Heart attack Mother    • Hypertension Mother    • Cancer Mother         leukemia • No Known Problems Father    • Hypertension Maternal Grandmother    • Diabetes Maternal Grandmother    • Breast cancer Neg Hx    • Colon cancer Neg Hx    • Ovarian cancer Neg Hx      I have reviewed and agree with the history as documented  E-Cigarette/Vaping   • E-Cigarette Use Current Some Day User      E-Cigarette/Vaping Substances   • Nicotine Yes      Social History     Tobacco Use   • Smoking status: Former Smoker     Types: Cigarettes     Quit date: 10/2021     Years since quittin 0   • Smokeless tobacco: Never Used   Vaping Use   • Vaping Use: Some days   • Substances: Nicotine   Substance Use Topics   • Alcohol use: Yes     Comment: couple times/month   • Drug use: Never       Review of Systems   All other systems reviewed and are negative  Physical Exam  Physical Exam  Vitals and nursing note reviewed  Constitutional:       General: She is not in acute distress  Appearance: Normal appearance  She is well-developed  She is not ill-appearing or diaphoretic  HENT:      Head: Normocephalic and atraumatic  Eyes:      Conjunctiva/sclera: Conjunctivae normal       Comments: EOM grossly intact   Neck:      Vascular: No JVD  Cardiovascular:      Rate and Rhythm: Normal rate  Pulmonary:      Effort: Pulmonary effort is normal  No respiratory distress  Breath sounds: No wheezing  Abdominal:      Palpations: Abdomen is soft  Musculoskeletal:      Cervical back: Normal range of motion and neck supple  Comments: FROM, steady gait, cap refill brisk, strength and sensation grossly intact throughout   Skin:     General: Skin is warm and dry  Capillary Refill: Capillary refill takes less than 2 seconds  Neurological:      General: No focal deficit present  Mental Status: She is alert and oriented to person, place, and time  Cranial Nerves: No cranial nerve deficit  Motor: No weakness        Coordination: Coordination normal    Psychiatric:         Behavior: Behavior normal          Vital Signs  ED Triage Vitals   Temperature Pulse Respirations Blood Pressure SpO2   10/11/22 2107 10/11/22 2105 10/11/22 2105 10/11/22 2105 10/11/22 2105   98 4 °F (36 9 °C) 85 16 124/75 99 %      Temp Source Heart Rate Source Patient Position - Orthostatic VS BP Location FiO2 (%)   10/11/22 2107 10/11/22 2105 10/11/22 2105 10/11/22 2105 --   Oral Monitor Sitting Left arm       Pain Score       10/11/22 2105       10 - Worst Possible Pain           Vitals:    10/11/22 2105   BP: 124/75   Pulse: 85   Patient Position - Orthostatic VS: Sitting         Visual Acuity      ED Medications  Medications   sodium chloride 0 9 % bolus 1,000 mL (1,000 mL Intravenous New Bag 10/11/22 2133)   ketorolac (TORADOL) injection 15 mg (15 mg Intravenous Given 10/11/22 2133)   metoclopramide (REGLAN) injection 10 mg (10 mg Intravenous Given 10/11/22 2133)       Diagnostic Studies  Results Reviewed     Procedure Component Value Units Date/Time    POCT pregnancy, urine [[id number]]  (Normal) Resulted: 10/11/22 2132    Lab Status: Final result Updated: 10/11/22 2132     EXT PREG TEST UR (Ref: Negative) negative     Control valid                 No orders to display              Procedures  Procedures         ED Course                               SBIRT 22yo+    Flowsheet Row Most Recent Value   SBIRT (23 yo +)    In order to provide better care to our patients, we are screening all of our patients for alcohol and drug use  Would it be okay to ask you these screening questions? Yes Filed at: 10/11/2022 2138   Initial Alcohol Screen: US AUDIT-C     1  How often do you have a drink containing alcohol? 0 Filed at: 10/11/2022 2138   2  How many drinks containing alcohol do you have on a typical day you are drinking? 0 Filed at: 10/11/2022 2138   3b  FEMALE Any Age, or MALE 65+: How often do you have 4 or more drinks on one occassion?  0 Filed at: 10/11/2022 2138   Audit-C Score 0 Filed at: 10/11/2022 2138   MICHELLE: How many times in the past year have you    Used an illegal drug or used a prescription medication for non-medical reasons? Never Filed at: 10/11/2022 2138                    [facility]  Number of Diagnoses or Management Options  Diagnosis management comments: 32 yo F presenting for evaluation of migraine for 2 days that has been unrelieved with OTC medications  Pt given migraine cocktail here with improvement of pain, she is asking to go home and continue to rest  No focal neurological deficits, f/u with pcp and neurology as an outaptient     strict return to ED precautions discussed  Pt verbalizes understanding and agrees with plan  Pt is stable for discharge    Portions of the record may have been created with voice recognition software  Occasional wrong word or "sound a like" substitutions may have occurred due to the inherent limitations of voice recognition software  Read the chart carefully and recognize, using context, where substitutions have occurred  Disposition  Final diagnoses:   Migraine     Time reflects when diagnosis was documented in both MDM as applicable and the Disposition within this note     Time User Action Codes Description Comment    10/11/2022 10:45 PM Keon Wilcox Add [G43 909] Migraine       ED Disposition     ED Disposition   Discharge    Condition   Stable    Date/Time   Tue Oct 11, 2022 10:45 PM    Comment   [address] discharge to home/self care                 Follow-up Information     Follow up With Specialties Details Why Contact Info Additional [address] Nurse Practitioner Call in 1 day  [address] [facility]        [facility] Emergency Department Emergency Medicine Go to  If symptoms worsen [address] [id number] [address] [facility] Emergency Department          Patient's Medications   Discharge Prescriptions    No medications on file       No discharge procedures on file      PDMP Review       Value Time User    PDMP Reviewed  Yes 10/1/2020 12:38 AM Art Degroot DO          ED Provider  Electronically Signed by           Beatris Daniels PA-C  10/11/22 9067

## 2022-10-17 LAB
LAB AP GYN PRIMARY INTERPRETATION: NORMAL
Lab: NORMAL
PATH INTERP SPEC-IMP: NORMAL

## 2022-10-30 ENCOUNTER — HOSPITAL ENCOUNTER (EMERGENCY)
Facility: HOSPITAL | Age: 32
Discharge: HOME | End: 2022-10-30
Attending: STUDENT IN AN ORGANIZED HEALTH CARE EDUCATION/TRAINING PROGRAM

## 2022-10-30 VITALS
HEART RATE: 78 BPM | TEMPERATURE: 98 F | SYSTOLIC BLOOD PRESSURE: 115 MMHG | BODY MASS INDEX: 30.51 KG/M2 | DIASTOLIC BLOOD PRESSURE: 75 MMHG | WEIGHT: 141 LBS | RESPIRATION RATE: 18 BRPM | OXYGEN SATURATION: 100 %

## 2022-10-30 DIAGNOSIS — M54.41 ACUTE RIGHT-SIDED LOW BACK PAIN WITH RIGHT-SIDED SCIATICA: Primary | ICD-10-CM

## 2022-10-30 RX ORDER — LIDOCAINE 50 MG/G
1 PATCH TOPICAL ONCE
Status: DISCONTINUED | OUTPATIENT
Start: 2022-10-30 | End: 2022-10-30 | Stop reason: HOSPADM

## 2022-10-30 RX ORDER — KETOROLAC TROMETHAMINE 30 MG/ML
30 INJECTION, SOLUTION INTRAMUSCULAR; INTRAVENOUS ONCE
Status: COMPLETED | OUTPATIENT
Start: 2022-10-30 | End: 2022-10-30

## 2022-10-30 RX ORDER — CYCLOBENZAPRINE HCL 10 MG
10 TABLET ORAL ONCE
Status: COMPLETED | OUTPATIENT
Start: 2022-10-30 | End: 2022-10-30

## 2022-10-30 RX ORDER — CYCLOBENZAPRINE HCL 10 MG
10 TABLET ORAL 2 TIMES DAILY PRN
Qty: 20 TABLET | Refills: 0 | Status: SHIPPED | OUTPATIENT
Start: 2022-10-30

## 2022-10-30 RX ADMIN — KETOROLAC TROMETHAMINE 30 MG: 30 INJECTION, SOLUTION INTRAMUSCULAR; INTRAVENOUS at 20:09

## 2022-10-30 RX ADMIN — LIDOCAINE 1 PATCH: 50 PATCH TOPICAL at 20:09

## 2022-10-30 RX ADMIN — CYCLOBENZAPRINE HYDROCHLORIDE 10 MG: 10 TABLET, FILM COATED ORAL at 20:09

## 2022-10-30 NOTE — Clinical Note
Balwinder Julian was seen and treated in our emergency department on 10/30/2022  Diagnosis:     Francesco Pearl  may return to work on return date  She may return on this date: 10/31/2022         If you have any questions or concerns, please don't hesitate to call        Rut Marquez RN    ______________________________           _______________          _______________  [facility] Representative                              Date                                Time

## 2022-10-30 NOTE — Clinical Note
Shirin Gray was seen and treated in our emergency department on 10/30/2022  Diagnosis:     Pili Madrid  may return to work on return date  She may return on this date: 10/31/2022         If you have any questions or concerns, please don't hesitate to call        Chris Maya RN    ______________________________           _______________          _______________  [facility] Representative                              Date                                Time

## 2022-10-31 ENCOUNTER — NURSE TRIAGE (OUTPATIENT)
Dept: PHYSICAL THERAPY | Facility: OTHER | Age: 32
End: 2022-10-31

## 2022-10-31 NOTE — TELEPHONE ENCOUNTER
Nurse called pt to proceed with triage process/questions  Phone disconnected/dropped after 2 rings  This nurse attempted to reach pt again  Second call went to pts VM  Message left with reason for call and to CB if she would like to complete the triage for referral placement  [facility] contact info, hours of operation, and VM instructions left  Will await CB from pt to proceed

## 2022-10-31 NOTE — TELEPHONE ENCOUNTER
Additional Information  • Negative: Is this related to a work injury? • Negative: Is this related to an MVA? • Negative: Are you currently recieving homecare services? Background - Initial Assessment  Clinical complaint: Pain is bilat low back radiating to right thigh  Also is now complaining of bilat mid back pain  No numbness and tingling  Started 10/28/22  NKI  Was seen ED 10/30/22     Date of onset: 10/28/22  Frequency of pain: constant  Quality of pain: aching    Protocols used: [facility]HENSIVE [facility] PROGRAM PROTOCOL

## 2022-10-31 NOTE — ED PROVIDER NOTES
History  Chief Complaint   Patient presents with   • Back Pain     Pain right lower back radiating right hip/leg x2 days   No injury     78-year-old female with no significant past medical history here for evaluation of right-sided low back pain at that started 2 days ago  Patient denies any preceding injury  States that she has had similar pain in the past   She describes a sharp, right-sided low back pain radiating into the upper thigh  States in the past she has been told this is sciatica  Has never had any back imaging  States symptoms usually go away on their own  She does not do any heavy lifting or repetitive motions at work  No fevers, nausea/vomiting, bowel or bladder dysfunction, saddle anesthesia, or history of immunocompromise  Prior to Admission Medications   Prescriptions Last Dose Informant Patient Reported? Taking? Isibloom 0 15-30 MG-MCG per tablet   No No   Sig: Take 1 tablet by mouth daily   cetirizine (ZyrTEC) 10 mg tablet   No No   Sig: TAKE 1 TABLET BY MOUTH DAILY   fluticasone (FLONASE) 50 mcg/act nasal spray   No No   Si spray into each nostril daily   linaCLOtide (Linzess) 290 MCG CAPS   No No   Sig: Take 1 capsule by mouth in the morning   meloxicam (MOBIC) 15 mg tablet   No No   Sig: TAKE 1 TABLET(15 MG) BY MOUTH DAILY      Facility-Administered Medications: None       Past Medical History:   Diagnosis Date   • Lyme disease    • Migraines        Past Surgical History:   Procedure Laterality Date   • WISDOM TOOTH EXTRACTION         Family History   Problem Relation Age of Onset   • Heart attack Mother    • Hypertension Mother    • Cancer Mother         leukemia   • No Known Problems Father    • Hypertension Maternal Grandmother    • Diabetes Maternal Grandmother    • Breast cancer Neg Hx    • Colon cancer Neg Hx    • Ovarian cancer Neg Hx      I have reviewed and agree with the history as documented      E-Cigarette/Vaping   • E-Cigarette Use Current Some Day User      E-Cigarette/Vaping Substances   • Nicotine Yes      Social History     Tobacco Use   • Smoking status: Former Smoker     Types: Cigarettes     Quit date: 10/2021     Years since quittin 0   • Smokeless tobacco: Never Used   Vaping Use   • Vaping Use: Some days   • Substances: Nicotine   Substance Use Topics   • Alcohol use: Yes     Comment: couple times/month   • Drug use: Never       Review of Systems   Constitutional: Negative for chills and fever  HENT: Negative for ear pain and sore throat  Eyes: Negative for pain and visual disturbance  Respiratory: Negative for cough and shortness of breath  Cardiovascular: Negative for chest pain and palpitations  Gastrointestinal: Negative for abdominal pain and vomiting  Genitourinary: Negative for dysuria and hematuria  Musculoskeletal: Positive for back pain  Negative for arthralgias  Skin: Negative for color change and rash  Neurological: Negative for syncope and headaches  Physical Exam  Physical Exam  Vitals and nursing note reviewed  Constitutional:       General: She is not in acute distress  Appearance: She is well-developed  HENT:      Head: Normocephalic and atraumatic  Eyes:      Conjunctiva/sclera: Conjunctivae normal    Cardiovascular:      Rate and Rhythm: Normal rate and regular rhythm  Heart sounds: No murmur heard  Pulmonary:      Effort: Pulmonary effort is normal  No respiratory distress  Breath sounds: Normal breath sounds  Abdominal:      Palpations: Abdomen is soft  Tenderness: There is no abdominal tenderness  Musculoskeletal:         General: No deformity or signs of injury  Cervical back: Neck supple  No tenderness  Comments: No tenderness to palpation over the midline cervical, thoracic, lumbar spines  Mild tenderness to palpation, right paraspinal muscles in the lower lumbar region   Skin:     General: Skin is warm and dry     Neurological:      Mental Status: She is alert and oriented to person, place, and time  Sensory: No sensory deficit  Motor: No weakness  Gait: Gait normal       Comments: Strength 5/5, bilateral lower extremities  Sensation intact in the distal bilateral lower extremities         Vital Signs  ED Triage Vitals   Temperature Pulse Respirations Blood Pressure SpO2   10/30/22 1935 10/30/22 1935 10/30/22 1935 10/30/22 1935 10/30/22 1935   98 °F (36 7 °C) 78 18 115/75 100 %      Temp src Heart Rate Source Patient Position - Orthostatic VS BP Location FiO2 (%)   -- 10/30/22 1935 10/30/22 1935 10/30/22 1935 --    Monitor Sitting Left arm       Pain Score       10/30/22 2009       9           Vitals:    10/30/22 1935   BP: 115/75   Pulse: 78   Patient Position - Orthostatic VS: Sitting         Visual Acuity      ED Medications  Medications   lidocaine (LIDODERM) 5 % patch 1 patch (1 patch Topical Medication Applied 10/30/22 2009)   ketorolac (TORADOL) injection 30 mg (30 mg Intramuscular Given 10/30/22 2009)   cyclobenzaprine (FLEXERIL) tablet 10 mg (10 mg Oral Given 10/30/22 2009)       Diagnostic Studies  Results Reviewed     None                 No orders to display              Procedures  Procedures         ED Course                                             MDM  Number of Diagnoses or Management Options  Acute right-sided low back pain with right-sided sciatica  Diagnosis management comments: Patient presenting with what appears to be in acute on chronic exacerbation of low back pain  She is well appearing without any red flag signs on history or exam   Will treat symptomatically, no indications for imaging at this time  Referral sent to the Comprehensive Spine program   Symptomatic management and usual back pain return precautions provided at discharge        Disposition  Final diagnoses:   Acute right-sided low back pain with right-sided sciatica     Time reflects when diagnosis was documented in both MDM as applicable and the Disposition within this note     Time User Action Codes Description Comment    10/30/2022  8:28 PM Ashley Olvera Add [M54 41] Acute right-sided low back pain with right-sided sciatica       ED Disposition     ED Disposition   Discharge    Condition   Stable    Date/Time   Sun Oct 30, 2022  8:28 PM    Comment   [address] discharge to home/self care                 Follow-up Information     Follow up With Specialties Details Why Contact Info    Corina Shasha, [address], Nurse Practitioner  As needed, [facility] symptoms worsen [phone] [facility]             Discharge Medication List as of 10/30/2022  8:30 PM      START taking these medications    Details   cyclobenzaprine (FLEXERIL) 10 mg tablet Take 1 tablet (10 mg total) by mouth 2 (two) times a day as needed for muscle spasms, Starting Sun 10/30/2022, Normal         CONTINUE these medications which have NOT CHANGED    Details   cetirizine (ZyrTEC) 10 mg tablet TAKE 1 TABLET BY MOUTH DAILY, Normal      fluticasone (FLONASE) 50 mcg/act nasal spray 1 spray into each nostril daily, Starting Mon 9/26/2022, Normal      Isibloom 0 15-30 MG-MCG per tablet Take 1 tablet by mouth daily, Starting Sat 10/8/2022, Normal      linaCLOtide (Linzess) 290 MCG CAPS Take 1 capsule by mouth in the morning, Starting Wed 9/28/2022, Normal      meloxicam (MOBIC) 15 mg tablet TAKE 1 TABLET(15 MG) BY MOUTH DAILY, Normal                 PDMP Review       Value Time User    PDMP Reviewed  Yes 10/1/2020 12:38 AM Nikhil Mccracken DO          ED Provider  Electronically Signed by           Germain Dance, MD  10/30/22 2043

## 2022-11-01 NOTE — TELEPHONE ENCOUNTER
Nurses were notified that Patient LM for [facility] yesterday @ 4:50pm   This nurse returned call and LM  Informed the patient that her message was received, but unfortunately VM was left after hours of operation  Requested pt CB when able to complete the triage started yesterday  Reminded of VM instructions and process should take around 5-15 mins  Suggested pt call on break/lunch if possible if at work  [facility] contact info, hours of operation, and VM instructions included as well  Will await CB to proceed

## 2022-11-04 ENCOUNTER — TELEPHONE (OUTPATIENT)
Dept: NEUROLOGY | Facility: CLINIC | Age: 32
End: 2022-11-04

## 2022-11-22 ENCOUNTER — HOSPITAL ENCOUNTER (EMERGENCY)
Facility: HOSPITAL | Age: 32
Discharge: HOME | End: 2022-11-22
Attending: EMERGENCY MEDICINE

## 2022-11-22 VITALS
OXYGEN SATURATION: 100 % | HEART RATE: 79 BPM | DIASTOLIC BLOOD PRESSURE: 79 MMHG | WEIGHT: 140 LBS | SYSTOLIC BLOOD PRESSURE: 118 MMHG | BODY MASS INDEX: 30.3 KG/M2 | TEMPERATURE: 98.2 F | RESPIRATION RATE: 20 BRPM

## 2022-11-22 DIAGNOSIS — J06.9 UPPER RESPIRATORY TRACT INFECTION, UNSPECIFIED TYPE: Primary | ICD-10-CM

## 2022-11-22 LAB
FLUAV RNA RESP QL NAA+PROBE: NEGATIVE
FLUBV RNA RESP QL NAA+PROBE: NEGATIVE
RSV RNA RESP QL NAA+PROBE: NEGATIVE
S PYO DNA THROAT QL NAA+PROBE: NOT DETECTED
SARS-COV-2 RNA RESP QL NAA+PROBE: NEGATIVE

## 2022-11-22 RX ORDER — LIDOCAINE HYDROCHLORIDE 20 MG/ML
15 SOLUTION OROPHARYNGEAL ONCE
Status: COMPLETED | OUTPATIENT
Start: 2022-11-22 | End: 2022-11-22

## 2022-11-22 RX ORDER — DEXAMETHASONE SODIUM PHOSPHATE 10 MG/ML
8 INJECTION, SOLUTION INTRAMUSCULAR; INTRAVENOUS ONCE
Status: COMPLETED | OUTPATIENT
Start: 2022-11-22 | End: 2022-11-22

## 2022-11-22 RX ADMIN — DEXAMETHASONE SODIUM PHOSPHATE 8 MG: 10 INJECTION, SOLUTION INTRAMUSCULAR; INTRAVENOUS at 15:22

## 2022-11-22 RX ADMIN — LIDOCAINE HYDROCHLORIDE 15 ML: 20 SOLUTION ORAL; TOPICAL at 15:22

## 2022-11-22 NOTE — Clinical Note
Trinidad Perez was seen and treated in our emergency department on 11/22/2022  No restrictions            Diagnosis:     Uvaldo Vega    She may return on this date: 11/23/2022         If you have any questions or concerns, please don't hesitate to call        Estella Sandoval PA-C    ______________________________           _______________          _______________  Hospital Representative                              Date                                Time

## 2022-11-22 NOTE — ED PROVIDER NOTES
HPI: Patient is a 33 y o  female who presents with 14 days of cough, sore throat, fatigue and myalgias which the patient describes at mild The patient has had contact with people with similar symptoms  The patient has not taken any medication  Allergies   Allergen Reactions   • Apple - Food Allergy Anaphylaxis       Past Medical History:   Diagnosis Date   • Asthma    • Lyme disease    • Migraines       Past Surgical History:   Procedure Laterality Date   • WISDOM TOOTH EXTRACTION       Social History     Tobacco Use   • Smoking status: Former     Types: Cigarettes     Quit date: 10/2021     Years since quittin 1   • Smokeless tobacco: Never   Vaping Use   • Vaping Use: Some days   • Substances: Nicotine   Substance Use Topics   • Alcohol use: Yes     Comment: couple times/month   • Drug use: Never       Nursing notes reviewed  Physical Exam:  ED Triage Vitals [22 1444]   Temperature Pulse Respirations Blood Pressure SpO2   98 2 °F (36 8 °C) 79 20 118/79 100 %      Temp Source Heart Rate Source Patient Position - Orthostatic VS BP Location FiO2 (%)   Oral Monitor Sitting Left arm --      Pain Score       --           ROS: Positive for cough, sore throat, fatigue and myalgias, the remainder of a 10 organ system ROS was otherwise unremarkable  General: awake, alert, no acute distress    Head: normocephalic, atraumatic    Eyes: no scleral icterus  Ears: external ears normal, hearing grossly intact  Nose: external exam grossly normal, negative nasal discharge  Neck: symmetric, No JVD noted, trachea midline  Pulmonary: no respiratory distress, no tachypnea noted  Cardiovascular: appears well perfused  Abdomen: no distention noted  Musculoskeletal: no deformities noted, tone normal  Neuro: grossly non-focal  Psych: mood and affect appropriate  Mouth: Tonsillar swelling 2+, bilateral  No exudate   Uvula midline    The patient is stable and has a history and physical exam consistent with a viral illness  COVID19 testing has been performed  I considered the patient's other medical conditions as applicable/noted above in my medical decision making  The patient is stable upon discharge  The plan is for supportive care at home  The patient (and any family present) verbalized understanding of the discharge instructions and warnings that would necessitate return to the Emergency Department  All questions were answered prior to discharge  Medications   dexamethasone (PF) (DECADRON) injection 8 mg (8 mg Intramuscular Given 11/22/22 1522)   Lidocaine Viscous HCl (XYLOCAINE) 2 % mucosal solution 15 mL (15 mL Swish & Swallow Given 11/22/22 1522)     Final diagnoses:   Upper respiratory tract infection, unspecified type     Time reflects when diagnosis was documented in both MDM as applicable and the Disposition within this note     Time User Action Codes Description Comment    11/22/2022  3:01 PM Maria L Dolan Add [J06 9] Upper respiratory tract infection, unspecified type       ED Disposition     ED Disposition   Discharge    Condition   Stable    Date/Time   Tue Nov 22, 2022  3:01 PM    Comment   [address] discharge to home/self care                 Follow-up Information     Follow up With Specialties Details Why Contact Info Additional [facility] [address] Minh Miller, Nurse Practitioner   [address] [facility]  [place] [facility] [address]        [facility] Emergency [facility] Emergency Medicine  As needed, If symptoms worsen [address] [phone] [address] [facility] Emergency Department        Discharge Medication List as of 11/22/2022  3:02 PM      START taking these medications    Details   al mag oxide-diphenhydramine-lidocaine viscous (MAGIC MOUTHWASH) 1:1:1 suspension Swish and spit 10 mL every 4 (four) hours as needed for mouth pain or discomfort, Starting Tue 11/22/2022, Normal CONTINUE these medications which have NOT CHANGED    Details   cetirizine (ZyrTEC) 10 mg tablet TAKE 1 TABLET BY MOUTH DAILY, Normal      cyclobenzaprine (FLEXERIL) 10 mg tablet Take 1 tablet (10 mg total) by mouth 2 (two) times a day as needed for muscle spasms, Starting Sun 10/30/2022, Normal      fluticasone (FLONASE) 50 mcg/act nasal spray 1 spray into each nostril daily, Starting Mon 9/26/2022, Normal      Isibloom 0 15-30 MG-MCG per tablet Take 1 tablet by mouth daily, Starting Sat 10/8/2022, Normal      linaCLOtide (Linzess) 290 MCG CAPS Take 1 capsule by mouth in the morning, Starting Wed 9/28/2022, Normal      meloxicam (MOBIC) 15 mg tablet TAKE 1 TABLET(15 MG) BY MOUTH DAILY, Normal           No discharge procedures on file      Electronically Signed by       Cristiano Duong PA-C  11/22/22 5611

## 2022-11-26 ENCOUNTER — HOSPITAL ENCOUNTER (EMERGENCY)
Facility: HOSPITAL | Age: 32
Discharge: HOME | End: 2022-11-26
Attending: EMERGENCY MEDICINE

## 2022-11-26 VITALS
OXYGEN SATURATION: 99 % | DIASTOLIC BLOOD PRESSURE: 92 MMHG | HEART RATE: 106 BPM | RESPIRATION RATE: 20 BRPM | TEMPERATURE: 99.3 F | WEIGHT: 142.64 LBS | BODY MASS INDEX: 30.87 KG/M2 | SYSTOLIC BLOOD PRESSURE: 145 MMHG

## 2022-11-26 DIAGNOSIS — J06.9 VIRAL UPPER RESPIRATORY TRACT INFECTION: Primary | ICD-10-CM

## 2022-11-26 RX ORDER — IBUPROFEN 600 MG/1
600 TABLET ORAL ONCE
Status: COMPLETED | OUTPATIENT
Start: 2022-11-26 | End: 2022-11-26

## 2022-11-26 RX ORDER — PSEUDOEPHEDRINE HCL 30 MG
30 TABLET ORAL EVERY 8 HOURS PRN
Qty: 20 TABLET | Refills: 0 | Status: SHIPPED | OUTPATIENT
Start: 2022-11-26

## 2022-11-26 RX ADMIN — IBUPROFEN 600 MG: 600 TABLET, FILM COATED ORAL at 18:07

## 2022-11-26 NOTE — ED PROVIDER NOTES
History  Chief Complaint   Patient presents with   • Cold Like Symptoms     Pt reports body aches, vomiting, runny nose, headache and sneezing   " was here the other day for sore throat " Patient tested negative for strep,flu, covid and rsv  Farzaneh Dale comes emergency department accompanied by her boyfriend after persistence of cold-like symptoms that include feelings of myalgia, decreased energy level, nasal congestion, intermittent/sporadic nonproductive cough, and intermittent feelings of dizziness  She had 1 episode of emesis associated with nausea earlier in the day  She states that she has been able to eat and drink items prior to coming to the emergency department without episodes of emesis  She states that she was evaluated the previous week in this emergency department and was found to be negative for RSV, flu, COVID, and strep  She states that she has been utilizing over-the-counter medications as needed for symptomatic relief but states that her symptoms have been persistent  She states that she has not had any changes in her baseline urination or bowel movements  She denies presence of any fevers (she does endorse that she subjectively feels chills), loss of consciousness, changes in vision, changes in hearing, numbness, paresthesias, or new rashes  She does endorse that there is diffuse myalgias throughout her entire body that are not focally located to a specific spot  She states that her significant other is been exhibiting similar symptoms that they have been “going back and forth” with episodes of cough, congestion, and generalized decrease in energy        History provided by:  Patient   used: No    URI  Presenting symptoms: congestion, cough, fatigue and rhinorrhea    Presenting symptoms: no ear pain, no fever and no sore throat    Congestion:     Location:  Nasal    Interferes with sleep: no      Interferes with eating/drinking: no    Cough:     Cough characteristics:  Non-productive    Sputum characteristics:  Nondescript    Severity:  Mild    Onset quality:  Gradual    Duration:  2 days    Timing:  Constant    Progression:  Unchanged    Chronicity:  New  Fatigue:     Severity:  Mild    Duration:  2 days    Timing:  Constant    Progression:  Unchanged  Severity:  Mild  Onset quality:  Sudden  Duration:  2 days  Timing:  Constant  Progression:  Unchanged  Chronicity:  New  Relieved by:  Nothing  Worsened by:  Nothing  Ineffective treatments:  None tried  Associated symptoms: myalgias    Associated symptoms: no arthralgias, no headaches, no neck pain, no sinus pain, no sneezing, no swollen glands and no wheezing    Risk factors: recent illness and sick contacts    Risk factors: not elderly, no immunosuppression and no recent travel        Prior to Admission Medications   Prescriptions Last Dose Informant Patient Reported? Taking?    Isibloom 0 15-30 MG-MCG per tablet   No No   Sig: Take 1 tablet by mouth daily   al mag oxide-diphenhydramine-lidocaine viscous (MAGIC MOUTHWASH) 1:1:1 suspension   No No   Sig: Swish and spit 10 mL every 4 (four) hours as needed for mouth pain or discomfort   cetirizine (ZyrTEC) 10 mg tablet   No No   Sig: TAKE 1 TABLET BY MOUTH DAILY   linaCLOtide (Linzess) 290 MCG CAPS   No No   Sig: Take 1 capsule by mouth in the morning   meloxicam (MOBIC) 15 mg tablet   No No   Sig: TAKE 1 TABLET(15 MG) BY MOUTH DAILY      Facility-Administered Medications: None       Past Medical History:   Diagnosis Date   • Asthma    • Lyme disease 2020   • Migraines 2020       Past Surgical History:   Procedure Laterality Date   • WISDOM TOOTH EXTRACTION         Family History   Problem Relation Age of Onset   • Heart attack Mother    • Hypertension Mother    • Cancer Mother         leukemia   • No Known Problems Father    • Hypertension Maternal Grandmother    • Diabetes Maternal Grandmother    • Breast cancer Neg Hx    • Colon cancer Neg Hx    • Ovarian cancer Neg Hx      I have reviewed and agree with the history as documented  E-Cigarette/Vaping   • E-Cigarette Use Current Some Day User      E-Cigarette/Vaping Substances   • Nicotine Yes      Social History     Tobacco Use   • Smoking status: Former     Types: Cigarettes     Quit date: 10/2021     Years since quittin 1   • Smokeless tobacco: Never   Vaping Use   • Vaping Use: Some days   • Substances: Nicotine   Substance Use Topics   • Alcohol use: Yes     Comment: couple times/month   • Drug use: Never        Review of Systems   Constitutional: Positive for fatigue  Negative for chills and fever  HENT: Positive for congestion and rhinorrhea  Negative for ear pain, sinus pain, sneezing and sore throat  Eyes: Negative for pain and visual disturbance  Respiratory: Positive for cough  Negative for shortness of breath and wheezing  Cardiovascular: Negative for chest pain and palpitations  Gastrointestinal: Negative for abdominal pain and vomiting  Genitourinary: Negative for dysuria and hematuria  Musculoskeletal: Positive for myalgias  Negative for arthralgias, back pain and neck pain  Skin: Negative for color change and rash  Neurological: Negative for seizures, syncope and headaches  All other systems reviewed and are negative  Physical Exam  ED Triage Vitals   Temperature Pulse Respirations Blood Pressure SpO2   22 1749 22 1749 22 1749 22 17422 174   99 3 °F (37 4 °C) (!) 106 20 145/92 99 %      Temp Source Heart Rate Source Patient Position - Orthostatic VS BP Location FiO2 (%)   22 1749 22 1749 22 17422 174 --   Oral Monitor Sitting Left arm       Pain Score       22 1807       10 - Worst Possible Pain             Orthostatic Vital Signs  Vitals:    22 174   BP: 145/92   Pulse: (!) 106   Patient Position - Orthostatic VS: Sitting       Physical Exam  Vitals and nursing note reviewed     Constitutional: Appearance: Normal appearance  She is well-developed  Comments: Patient is sitting comfortably in a stretcher in the emergency department  HENT:      Head: Normocephalic and atraumatic  Right Ear: External ear normal       Left Ear: External ear normal       Nose: Nose normal  No congestion or rhinorrhea  Mouth/Throat:      Mouth: Mucous membranes are moist    Eyes:      Extraocular Movements: Extraocular movements intact  Conjunctiva/sclera: Conjunctivae normal       Pupils: Pupils are equal, round, and reactive to light  Cardiovascular:      Rate and Rhythm: Normal rate and regular rhythm  Pulses: Normal pulses  Heart sounds: Normal heart sounds  No murmur heard  Pulmonary:      Effort: Pulmonary effort is normal  No respiratory distress  Breath sounds: Normal breath sounds  No stridor  No wheezing, rhonchi or rales  Abdominal:      General: Abdomen is flat  Palpations: Abdomen is soft  Tenderness: There is no abdominal tenderness  Musculoskeletal:         General: No swelling, deformity or signs of injury  Normal range of motion  Cervical back: Normal range of motion and neck supple  Right lower leg: No edema  Left lower leg: No edema  Skin:     General: Skin is warm and dry  Capillary Refill: Capillary refill takes less than 2 seconds  Coloration: Skin is not pale  Findings: No bruising or erythema  Neurological:      General: No focal deficit present  Mental Status: She is alert and oriented to person, place, and time     Psychiatric:         Mood and Affect: Mood normal          ED Medications  Medications   ibuprofen (MOTRIN) tablet 600 mg (600 mg Oral Given 11/26/22 1807)       Diagnostic Studies  Results Reviewed     None                 No orders to display         Procedures  Procedures      ED Course                             SBIRT 22yo+    Flowsheet Row Most Recent Value   SBIRT (25 yo +)    In order to provide better care to our patients, we are screening all of our patients for alcohol and drug use  Would it be okay to ask you these screening questions? Unable to answer at this time Filed at: 11/26/2022 1802                [facility]  Number of Diagnoses or Management Options  Viral upper respiratory tract infection: established and improving  Diagnosis management comments: Ana Maria Nance comes emergency department with signs and symptoms consistent with a viral URI pathology that was previously evaluated the week before  Patient states that her symptoms have been persistent  Given patient's unremarkable physical examination as well as clinical stability in the emergency department, patient was deemed to be stable for discharge home  Patient was counseled on continued utilization of appropriate hydration as well as p o  Intake for appropriate caloric intake  Patient was counseled of the duration of her symptoms will most likely continue for the next couple a days that she should continue to utilize over-the-counter medications for control symptoms  Patient was also provided with a short prescription of Sudafed for help with nasal congestion and symptoms  Patient expressed understanding with this plan and was informed that she should follow-up with her primary care provider as soon as possible for continued evaluation of her symptoms  Patient expressed understanding with this plan  Disposition:  Discharged home with continued utilization of over-the-counter therapies for symptom control as well as a prescription for Sudafed for control of nasal congestion, close PCP follow-up, strict return precautions discussed at bedside         Amount and/or Complexity of Data Reviewed  Clinical lab tests: reviewed  Tests in the radiology section of CPT®: reviewed  Obtain history from someone other than the patient: yes  Review and summarize past medical records: yes  Discuss the patient with other providers: yes  Independent visualization of images, tracings, or specimens: yes    Risk of Complications, Morbidity, and/or Mortality  Presenting problems: low  Diagnostic procedures: low  Management options: low    Patient Progress  Patient progress: stable      Disposition  Final diagnoses:   Viral upper respiratory tract infection     Time reflects when diagnosis was documented in both MDM as applicable and the Disposition within this note     Time User Action Codes Description Comment    11/26/2022  6:01 PM Wilhemina Delay Add [J06 9] Viral upper respiratory tract infection       ED Disposition     ED Disposition   Discharge    Condition   Stable    Date/Time   Sat Nov 26, 2022  6:06 PM    Comment   [address] discharge to home/self care                 Follow-up Information     Follow up With Specialties Details Why Contact Info Additional Information    Den Castro, [address] [facility], Nurse Practitioner  As needed, If symptoms worsen [address] [facility] Emergency Department Emergency Medicine  As needed, If symptoms worsen [address] [phone] [address] [facility] Emergency Department          Discharge Medication List as of 11/26/2022  6:06 PM      START taking these medications    Details   pseudoephedrine (SUDAFED) 30 mg tablet Take 1 tablet (30 mg total) by mouth every 8 (eight) hours as needed for congestion for up to 20 doses, Starting Sat 11/26/2022, Normal         CONTINUE these medications which have NOT CHANGED    Details   al mag oxide-diphenhydramine-lidocaine viscous (MAGIC MOUTHWASH) 1:1:1 suspension Swish and spit 10 mL every 4 (four) hours as needed for mouth pain or discomfort, Starting Tue 11/22/2022, Normal      cetirizine (ZyrTEC) 10 mg tablet TAKE 1 TABLET BY MOUTH DAILY, Normal      Isibloom 0 15-30 MG-MCG per tablet Take 1 tablet by mouth daily, Starting Sat 10/8/2022, Normal      linaCLOtide (Linzess) 290 MCG CAPS Take 1 capsule by mouth in the morning, Starting Wed 9/28/2022, Normal      meloxicam (MOBIC) 15 mg tablet TAKE 1 TABLET(15 MG) BY MOUTH DAILY, Normal           No discharge procedures on file  PDMP Review       Value Time User    PDMP Reviewed  Yes 10/1/2020 12:38 AM Cha Rivers DO           ED Provider  Attending physically available and evaluated Elena Soriaghada  I managed the patient along with the ED Attending      Electronically Signed by         Natacha Duenas MD  11/26/22 9209

## 2022-11-26 NOTE — DISCHARGE INSTRUCTIONS
Please follow-up with your primary care provider as soon as possible for continued evaluation of your symptoms  Please continue to maintain appropriate hydration with appropriate intake of liquids by mouth as well as food by mouth  Please return to the emergency department if you experience worsening of symptoms that include but are not limited to: Loss of consciousness, persistent chest pain that does not resolve with any medications, inability to catch your breath, persistent nausea that prevented from tolerating any solids or liquids by mouth, changes in urination, changes in bowel movement, or persistence of fever that does not respond to any medications

## 2022-11-27 NOTE — ED ATTENDING ATTESTATION
11/26/2022  IFifi MD, saw and evaluated the patient  I have discussed the patient with the resident/non-physician practitioner and agree with the resident's/non-physician practitioner's findings, Plan of Care, and MDM as documented in the resident's/non-physician practitioner's note, except where noted  All available labs and Radiology studies were reviewed  I was present for key portions of any procedure(s) performed by the resident/non-physician practitioner and I was immediately available to provide assistance  32 yo female recently tested for COVID with continued URI symptoms  Minimal relief with OTC meds  VSS:  +nasal congestion  TMs WNL, Heart rrr, lungs CTA b/l   Abd  Soft, nt nd  Plan as discussed with resident  At this point I agree with the current assessment done in the Emergency Department    I have conducted an independent evaluation of this patient a history and physical is as follows:    ED Course         Critical Care Time  Procedures

## 2022-12-01 NOTE — PROGRESS NOTES
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, which I don't manage, but have referred her to Dr Lesly Rico in the community who does manage  Of note, her lupus anticoagulant was positive in the past, so repeated it today, which returned positive as well  She possibly has underlying antiphospholipid syndrome, but since does not have any history of blood clots or miscarriages, will recommend that she should take at least 1 baby aspirin a day  Do labs  See Dr Lesly Rico for post-Lyme disease symptoms  Take 2,000 units of Vit  D daily    Return to clinic as needed    Plan:  Diagnoses and all orders for this visit:    Multiple joint pain  -     Ambulatory Referral to Rheumatology  -     Sedimentation rate, automated  -     C-reactive protein  -     Lupus anticoagulant  -     Cardiolipin antibody  -     Beta-2 glycoprotein antibodies  -     CBC and differential  -     Comprehensive metabolic panel  -     DRVVT Mix-LA  -     DRVVT Confirm-LA    Lyme disease  -     Ambulatory Referral to Rheumatology  -     Ambulatory Referral to [facility]; Future    Lupus anticoagulant positive  -     Lupus anticoagulant  -     Cardiolipin antibody  -     Beta-2 glycoprotein antibodies  -     DRVVT Mix-LA  -     DRVVT Confirm-LA  Follow-up plan: RTC as needed        HPI  Toshia Wall is a 33 y o   female who presents as a Rheumatology consult referred by Nathaly Goldne MD for evaluation of possible Lyme disease related symptoms  Patient had Lyme disease in 2020, and completed a 2-week course of doxycycline in 2020  She then completed 1 month course course in August 2022  Complains of pain in her wrists, elbows, and knees  The joints hurt and swell with weather changes    Admits to half an hour of morning stiffness  She has ovarian cysts  She has tried ibuprofen, Tylenol, meloxicam, naproxen, which did not help  Review of Systems  Review of Systems   Constitutional: Negative for chills and fever  HENT: Negative for ear pain and sore throat  Eyes: Negative for pain and visual disturbance  Respiratory: Negative for cough and shortness of breath  Cardiovascular: Negative for chest pain and palpitations  Gastrointestinal: Negative for abdominal pain and vomiting  Endocrine:        Tired/sluggish   Genitourinary: Negative for dysuria and hematuria  Musculoskeletal: Positive for arthralgias, back pain and myalgias  Skin: Negative for color change and rash  Neurological: Positive for numbness  Negative for seizures and syncope  All other systems reviewed and are negative  Reviewed and agree      Allergies  Allergies   Allergen Reactions   • Apple - Food Allergy Anaphylaxis       Home Medications    Current Outpatient Medications:   •  al mag oxide-diphenhydramine-lidocaine viscous (MAGIC MOUTHWASH) 1:1:1 suspension, Swish and spit 10 mL every 4 (four) hours as needed for mouth pain or discomfort, Disp: 90 mL, Rfl: 0  •  cetirizine (ZyrTEC) 10 mg tablet, TAKE 1 TABLET BY MOUTH DAILY, Disp: 90 tablet, Rfl: 0  •  Isibloom 0 15-30 MG-MCG per tablet, Take 1 tablet by mouth daily, Disp: 28 tablet, Rfl: 12  •  linaCLOtide (Linzess) 290 MCG CAPS, Take 1 capsule by mouth in the morning, Disp: 30 capsule, Rfl: 3  •  meloxicam (MOBIC) 15 mg tablet, TAKE 1 TABLET(15 MG) BY MOUTH DAILY, Disp: 90 tablet, Rfl: 1  •  pseudoephedrine (SUDAFED) 30 mg tablet, Take 1 tablet (30 mg total) by mouth every 8 (eight) hours as needed for congestion for up to 20 doses, Disp: 20 tablet, Rfl: 0    Past Medical History  Past Medical History:   Diagnosis Date   • Asthma    • Lyme disease 2020   • Migraines 2020       Past Surgical History   Past Surgical History:   Procedure Laterality Date   • WISDOM TOOTH EXTRACTION         Family History    Family History   Problem Relation Age of Onset   • Heart attack Mother    • Hypertension Mother    • Cancer Mother         leukemia   • No Known Problems Father    • Hypertension Maternal Grandmother    • Diabetes Maternal Grandmother    • Breast cancer Neg Hx    • Colon cancer Neg Hx    • Ovarian cancer Neg Hx      No known family history of autoimmune or inflammatory diseases  Social History  Occupation: used to work in a [employer]  Social History     Substance and Sexual Activity   Alcohol Use Not Currently    Comment: couple times/month     Social History     Substance and Sexual Activity   Drug Use Never     Social History     Tobacco Use   Smoking Status Former   • Types: Cigarettes   • Quit date: 10/2021   • Years since quittin 1   Smokeless Tobacco Never       Objective:  Vitals:    22 1347   BP: 108/70   Weight: 63 kg (139 lb)   Height: 4' 9" (1 448 m)       Physical Exam  Constitutional:       General: She is not in acute distress  HENT:      Head: Normocephalic and atraumatic  Eyes:      Conjunctiva/sclera: Conjunctivae normal    Cardiovascular:      Rate and Rhythm: Normal rate and regular rhythm  Heart sounds: S1 normal and S2 normal      No friction rub  Pulmonary:      Effort: Pulmonary effort is normal  No respiratory distress  Breath sounds: Normal breath sounds  No wheezing, rhonchi or rales  Musculoskeletal:      Cervical back: Neck supple  Skin:     General: Skin is warm and dry  Coloration: Skin is not pale  Findings: No rash  Neurological:      Mental Status: She is alert  Mental status is at baseline  Psychiatric:         Mood and Affect: Mood normal          Behavior: Behavior normal        Reviewed labs and imaging      Imaging:   XR left knee 22  Unremarkable     XR right knee 22  Unremarkable     XR abdomen obstruction series 22  Mottled appearance in the left upper abdomen of gastric contents  Correlate for gastric motility etiology  No significant stool  XR chest portable 12/7/21  Unremarkable     XR chest pa & lateral 8/9/21  Unremarkable     CT abdomen pelvis w contrast 6/2921  Mild wall thickening in the descending colon may be secondary to colitis versus under distention  No evidence of proctitis  US pelvis 5/22/21  Unremarkable     MRI prain w wo contrast 1/28/21  Unremarkable     CT abdomen pelvis w contrast 8/29/20  1   5 1 cm hypodense lesion in the right adnexa with small amount of fluid in the adnexa, extending into the pelvis  Findings most compatible with rupture of mildly complex cyst    According to current guidelines Rosy Orozco Brandee Radiol 2020; 65:160-464) in   this premenopausal woman, this should be further characterized by routine, outpatient pelvic ultrasound  2   Circumferential bladder wall thickening  Although the findings are likely related to under distention, given the patient's symptoms, correlate for mild cystitis  3   Massively distended debris-filled stomach  Although the findings may be related to the nonfasting state of the patient (last ate at approximately 11:00 PM last evening) correlate for gastroparesis  Labs:   Office Visit on 12/06/2022   Component Date Value Ref Range Status   • Sed Rate 12/06/2022 24 (H)  0 - 19 mm/hour Final   • CRP 12/06/2022 <3 0  <3 0 mg/L Final   • PTT Lupus Anticoagulant 12/06/2022 41 4  0 0 - 51 9 sec Final   • Dilute Viper Venom Time 12/06/2022 55 3 (H)  0 0 - 47 0 sec Final   • DILUTE PROTHROMBIN TIME(DPT) 12/06/2022 46 9  0 0 - 47 6 sec Final   • THROMBIN TIME (DRVW) 12/06/2022 27 4 (H)  0 0 - 23 0 sec Final   • DPT CONFIRM RATIO 12/06/2022 1 37 (H)  0 00 - 1 34 Ratio Final   • LUPUS REFLEX INTERPRETATION 12/06/2022 Comment:   Final    Results are consistent with the presence of a lupus anticoagulant   As only  persistent lupus anticoagulant (LA) positivity meets laboratory diagnostic  criteria for antiphospholipid syndrome, repeat testing in 12 or more weeks  is recommended, ideally in the absence of anticoagulant therapy  Important Note: The results of LA testing are not valid for patients  receiving heparin, direct Xa inhibitor (e g , rivaroxaban, apixaban) or  direct thrombin inhibitor (e g , dabigatran) therapy  These drugs may cause  false positive LA results but will not interfere with anticardiolipin and  beta-2 glycoprotein 1 antibody testing     • ANTICARDIOLIPIN IGG ANTIBODY 12/06/2022 0 6  See comment Final   • ANTICARDIOLIPIN IGA ANTIBODY 12/06/2022 3 2  See comment Final   • ANTICARDIOLIPIN IGM ANTIBODY 12/06/2022 5 4  See comment Final   • BETA 2 GLYCO 1 IGG 12/06/2022 <0 8  See comment Final   • BETA 2 GLYCO 1 IGA 12/06/2022 1 8  See comment Final   • BETA 2 GLYCO 1 IGM 12/06/2022 <2 4  See comment Final   • WBC 12/06/2022 9 09  4 31 - 10 16 Thousand/uL Final   • RBC 12/06/2022 4 93  3 81 - 5 12 Million/uL Final   • Hemoglobin 12/06/2022 13 9  11 5 - 15 4 g/dL Final   • Hematocrit 12/06/2022 43 5  34 8 - 46 1 % Final   • MCV 12/06/2022 88  82 - 98 fL Final   • MCH 12/06/2022 28 2  26 8 - 34 3 pg Final   • MCHC 12/06/2022 32 0  31 4 - 37 4 g/dL Final   • RDW 12/06/2022 12 4  11 6 - 15 1 % Final   • MPV 12/06/2022 11 9  8 9 - 12 7 fL Final   • Platelets 74/50/8931 301  149 - 390 Thousands/uL Final   • nRBC 12/06/2022 0  /100 WBCs Final   • Neutrophils Relative 12/06/2022 69  43 - 75 % Final   • Immat GRANS % 12/06/2022 0  0 - 2 % Final   • Lymphocytes Relative 12/06/2022 23  14 - 44 % Final   • Monocytes Relative 12/06/2022 7  4 - 12 % Final   • Eosinophils Relative 12/06/2022 1  0 - 6 % Final   • Basophils Relative 12/06/2022 0  0 - 1 % Final   • Neutrophils Absolute 12/06/2022 6 24  1 85 - 7 62 Thousands/µL Final   • Immature Grans Absolute 12/06/2022 0 04  0 00 - 0 20 Thousand/uL Final   • Lymphocytes Absolute 12/06/2022 2 07  0 60 - 4 47 Thousands/µL Final   • Monocytes Absolute 12/06/2022 0  61  0 17 - 1 22 Thousand/µL Final   • Eosinophils Absolute 12/06/2022 0 10  0 00 - 0 61 Thousand/µL Final   • Basophils Absolute 12/06/2022 0 03  0 00 - 0 10 Thousands/µL Final   • Sodium 12/06/2022 140  135 - 147 mmol/L Final   • Potassium 12/06/2022 3 5  3 5 - 5 3 mmol/L Final   • Chloride 12/06/2022 110 (H)  96 - 108 mmol/L Final   • CO2 12/06/2022 25  21 - 32 mmol/L Final   • ANION GAP 12/06/2022 5  4 - 13 mmol/L Final   • BUN 12/06/2022 11  5 - 25 mg/dL Final   • Creatinine 12/06/2022 0 85  0 60 - 1 30 mg/dL Final    Standardized to IDMS reference method   • Glucose 12/06/2022 111  65 - 140 mg/dL Final    If the patient is fasting, the ADA then defines impaired fasting glucose as > 100 mg/dL and diabetes as > or equal to 123 mg/dL  Specimen collection should occur prior to Sulfasalazine administration due to the potential for falsely depressed results  Specimen collection should occur prior to Sulfapyridine administration due to the potential for falsely elevated results  • Calcium 12/06/2022 9 9  8 3 - 10 1 mg/dL Final   • AST 12/06/2022 16  5 - 45 U/L Final    Specimen collection should occur prior to Sulfasalazine administration due to the potential for falsely depressed results  • ALT 12/06/2022 24  12 - 78 U/L Final    Specimen collection should occur prior to Sulfasalazine and/or Sulfapyridine administration due to the potential for falsely depressed results  • Alkaline Phosphatase 12/06/2022 67  46 - 116 U/L Final   • Total Protein 12/06/2022 8 1  6 4 - 8 4 g/dL Final   • Albumin 12/06/2022 4 0  3 5 - 5 0 g/dL Final   • Total Bilirubin 12/06/2022 0 18 (L)  0 20 - 1 00 mg/dL Final    Use of this assay is not recommended for patients undergoing treatment with eltrombopag due to the potential for falsely elevated results  • eGFR 12/06/2022 91  ml/min/1 73sq m Final   • dRVVT Mix Interp   12/06/2022 46 6 (H)  0 0 - 40 4 sec Final   • DRVVT/Confirm Ratio 12/06/2022 1 4 (H)  0 8 - 1 2 ratio Final Admission on 11/22/2022, Discharged on 11/22/2022   Component Date Value Ref Range Status   • SARS-CoV-2 11/22/2022 Negative  Negative Final   • INFLUENZA A PCR 11/22/2022 Negative  Negative Final   • INFLUENZA B PCR 11/22/2022 Negative  Negative Final   • RSV PCR 11/22/2022 Negative  Negative Final   • STREP A PCR 11/22/2022 Not Detected  Not Detected Final   Admission on 10/11/2022, Discharged on 10/11/2022   Component Date Value Ref Range Status   • EXT PREG TEST UR (Ref: Negative) 10/11/2022 negative   Final   • Control 10/11/2022 valid   Final   Appointment on 10/08/2022   Component Date Value Ref Range Status   • HIV-1/HIV-2 Ab 10/08/2022 Non-Reactive  Non-Reactive Final   • Hepatitis C Ab 10/08/2022 Non-reactive  Non-reactive Final   • Hepatitis B Surface Ag 10/08/2022 Non-reactive  Non-reactive, NonReactive - Confirmed Final   • RPR 10/08/2022 Non-Reactive  Non-Reactive Final   Annual Exam on 10/08/2022   Component Date Value Ref Range Status   • Case Report 10/08/2022    Final                    Value:Gynecologic Cytology Report                       Case: Ruth Collins Provider:  LEIGHANN Souza        Collected:           10/08/2022 1035              Ordering Location:     [place] [address]      Received:            10/08/2022 [place]                                                          First Screen:          Emily Canela                                                               Rescreen:              Dominic Tyler                                                                  Specimen:    LIQUID-BASED PAP, SCREENING, Cervix                                                       • Primary Interpretation 10/08/2022 Negative for intraepithelial lesion or malignancy   Final   • Interpretation 10/08/2022 Shift in [place] suggestive of bacterial vaginosis   Final   • Specimen Adequacy 10/08/2022 Satisfactory for evaluation  Endocervical/transformation zone component present  Final   • Additional Information 10/08/2022    Final                    Value: This result contains rich text formatting which cannot be displayed here  • WET MOUNT 10/08/2022 abnormal   Final   • Yeast, Wet Prep 10/08/2022 -   Final   • pH 10/08/2022 5 0   Final   • Whiff Test 10/08/2022 +   Final   • Clue Cells 10/08/2022 +   Final   • Trich, Wet Prep 10/08/2022 -   Final   • N gonorrhoeae, DNA Probe 10/08/2022 Negative  Negative Final   • Chlamydia trachomatis, DNA Probe 10/08/2022 Negative  Negative Final   • HPV Other HR 10/08/2022 Negative  Negative Final    HPV types: 92,16,08,42,27,66,50,95,01,59,18 and 68 DNA are undetectable or below the pre-set threshold     • HPV16 10/08/2022 Negative  Negative Final   • HPV18 10/08/2022 Negative  Negative Final   Office Visit on 09/26/2022   Component Date Value Ref Range Status   •  RAPID STREP A 09/27/2022 Negative  Negative Final   Office Visit on 09/16/2022   Component Date Value Ref Range Status   • WET MOUNT 09/16/2022 abnormal   Final   • Yeast, Wet Prep 09/16/2022 -   Final   • pH 09/16/2022 5 0   Final   • Whiff Test 09/16/2022 +   Final   • Clue Cells 09/16/2022 +   Final   • Trich, Wet Prep 09/16/2022 -   Final

## 2022-12-06 ENCOUNTER — OFFICE VISIT (OUTPATIENT)
Dept: RHEUMATOLOGY | Facility: CLINIC | Age: 32
End: 2022-12-06

## 2022-12-06 ENCOUNTER — APPOINTMENT (OUTPATIENT)
Dept: LAB | Facility: CLINIC | Age: 32
End: 2022-12-06

## 2022-12-06 VITALS
WEIGHT: 139 LBS | BODY MASS INDEX: 29.99 KG/M2 | HEIGHT: 57 IN | SYSTOLIC BLOOD PRESSURE: 108 MMHG | DIASTOLIC BLOOD PRESSURE: 70 MMHG

## 2022-12-06 DIAGNOSIS — A69.20 LYME DISEASE: ICD-10-CM

## 2022-12-06 DIAGNOSIS — M25.50 MULTIPLE JOINT PAIN: Primary | ICD-10-CM

## 2022-12-06 DIAGNOSIS — R76.0 LUPUS ANTICOAGULANT POSITIVE: ICD-10-CM

## 2022-12-06 LAB
ALBUMIN SERPL BCP-MCNC: 4 G/DL (ref 3.5–5)
ALP SERPL-CCNC: 67 U/L (ref 46–116)
ALT SERPL W P-5'-P-CCNC: 24 U/L (ref 12–78)
ANION GAP SERPL CALCULATED.3IONS-SCNC: 5 MMOL/L (ref 4–13)
AST SERPL W P-5'-P-CCNC: 16 U/L (ref 5–45)
BASOPHILS # BLD AUTO: 0.03 THOUSANDS/ÂΜL (ref 0–0.1)
BASOPHILS NFR BLD AUTO: 0 % (ref 0–1)
BILIRUB SERPL-MCNC: 0.18 MG/DL (ref 0.2–1)
BUN SERPL-MCNC: 11 MG/DL (ref 5–25)
CALCIUM SERPL-MCNC: 9.9 MG/DL (ref 8.3–10.1)
CHLORIDE SERPL-SCNC: 110 MMOL/L (ref 96–108)
CO2 SERPL-SCNC: 25 MMOL/L (ref 21–32)
CREAT SERPL-MCNC: 0.85 MG/DL (ref 0.6–1.3)
CRP SERPL QL: <3 MG/L
EOSINOPHIL # BLD AUTO: 0.1 THOUSAND/ÂΜL (ref 0–0.61)
EOSINOPHIL NFR BLD AUTO: 1 % (ref 0–6)
ERYTHROCYTE [DISTWIDTH] IN BLOOD BY AUTOMATED COUNT: 12.4 % (ref 11.6–15.1)
ERYTHROCYTE [SEDIMENTATION RATE] IN BLOOD: 24 MM/HOUR (ref 0–19)
GFR SERPL CREATININE-BSD FRML MDRD: 91 ML/MIN/1.73SQ M
GLUCOSE SERPL-MCNC: 111 MG/DL (ref 65–140)
HCT VFR BLD AUTO: 43.5 % (ref 34.8–46.1)
HGB BLD-MCNC: 13.9 G/DL (ref 11.5–15.4)
IMM GRANULOCYTES # BLD AUTO: 0.04 THOUSAND/UL (ref 0–0.2)
IMM GRANULOCYTES NFR BLD AUTO: 0 % (ref 0–2)
LYMPHOCYTES # BLD AUTO: 2.07 THOUSANDS/ÂΜL (ref 0.6–4.47)
LYMPHOCYTES NFR BLD AUTO: 23 % (ref 14–44)
MCH RBC QN AUTO: 28.2 PG (ref 26.8–34.3)
MCHC RBC AUTO-ENTMCNC: 32 G/DL (ref 31.4–37.4)
MCV RBC AUTO: 88 FL (ref 82–98)
MONOCYTES # BLD AUTO: 0.61 THOUSAND/ÂΜL (ref 0.17–1.22)
MONOCYTES NFR BLD AUTO: 7 % (ref 4–12)
NEUTROPHILS # BLD AUTO: 6.24 THOUSANDS/ÂΜL (ref 1.85–7.62)
NEUTS SEG NFR BLD AUTO: 69 % (ref 43–75)
NRBC BLD AUTO-RTO: 0 /100 WBCS
PLATELET # BLD AUTO: 301 THOUSANDS/UL (ref 149–390)
PMV BLD AUTO: 11.9 FL (ref 8.9–12.7)
POTASSIUM SERPL-SCNC: 3.5 MMOL/L (ref 3.5–5.3)
PROT SERPL-MCNC: 8.1 G/DL (ref 6.4–8.4)
RBC # BLD AUTO: 4.93 MILLION/UL (ref 3.81–5.12)
SODIUM SERPL-SCNC: 140 MMOL/L (ref 135–147)
WBC # BLD AUTO: 9.09 THOUSAND/UL (ref 4.31–10.16)

## 2022-12-09 LAB
APTT SCREEN TO CONFIRM RATIO: 1.37 RATIO (ref 0–1.34)
B2 GLYCOPROT1 IGA SERPL IA-ACNC: 1.8
B2 GLYCOPROT1 IGG SERPL IA-ACNC: <0.8
B2 GLYCOPROT1 IGM SERPL IA-ACNC: <2.4
CARDIOLIPIN IGA SER IA-ACNC: 3.2
CARDIOLIPIN IGG SER IA-ACNC: 0.6
CARDIOLIPIN IGM SER IA-ACNC: 5.4
CONFIRM APTT/NORMAL: 46.9 SEC (ref 0–47.6)
DRVVT IMM 1:2 NP PPP: 46.6 SEC (ref 0–40.4)
DRVVT SCREEN TO CONFIRM RATIO: 1.4 RATIO (ref 0.8–1.2)
LA PPP-IMP: ABNORMAL
SCREEN APTT: 41.4 SEC (ref 0–51.9)
SCREEN DRVVT: 55.3 SEC (ref 0–47)
THROMBIN TIME: 27.4 SEC (ref 0–23)

## 2022-12-12 ENCOUNTER — PATIENT MESSAGE (OUTPATIENT)
Dept: RHEUMATOLOGY | Facility: CLINIC | Age: 32
End: 2022-12-12

## 2023-01-16 ENCOUNTER — APPOINTMENT (OUTPATIENT)
Dept: LAB | Facility: CLINIC | Age: 33
End: 2023-01-16

## 2023-01-16 ENCOUNTER — OFFICE VISIT (OUTPATIENT)
Dept: FAMILY MEDICINE CLINIC | Facility: CLINIC | Age: 33
End: 2023-01-16

## 2023-01-16 VITALS
BODY MASS INDEX: 31.13 KG/M2 | WEIGHT: 144.3 LBS | HEIGHT: 57 IN | OXYGEN SATURATION: 98 % | SYSTOLIC BLOOD PRESSURE: 122 MMHG | HEART RATE: 85 BPM | TEMPERATURE: 97.8 F | DIASTOLIC BLOOD PRESSURE: 84 MMHG | RESPIRATION RATE: 16 BRPM

## 2023-01-16 DIAGNOSIS — R76.0 ANTIPHOSPHOLIPID ANTIBODY POSITIVE: ICD-10-CM

## 2023-01-16 DIAGNOSIS — A69.23 LYME ARTHRITIS (HCC): ICD-10-CM

## 2023-01-16 DIAGNOSIS — G43.701 CHRONIC MIGRAINE WITHOUT AURA WITH STATUS MIGRAINOSUS, NOT INTRACTABLE: Primary | ICD-10-CM

## 2023-01-16 LAB
APTT PPP: 28 SECONDS (ref 23–37)
INR PPP: 0.84 (ref 0.84–1.19)
PROTHROMBIN TIME: 11.8 SECONDS (ref 11.6–14.5)
VIT B12 SERPL-MCNC: 619 PG/ML (ref 100–900)

## 2023-01-16 RX ORDER — CALCIUM CARBONATE 300MG(750)
400 TABLET,CHEWABLE ORAL DAILY
Qty: 90 TABLET | Refills: 3 | Status: SHIPPED | OUTPATIENT
Start: 2023-01-16

## 2023-01-16 RX ORDER — RIZATRIPTAN BENZOATE 10 MG/1
10 TABLET ORAL AS NEEDED
Qty: 9 TABLET | Refills: 0 | Status: SHIPPED | OUTPATIENT
Start: 2023-01-16

## 2023-01-16 RX ORDER — AMITRIPTYLINE HYDROCHLORIDE 10 MG/1
10 TABLET, FILM COATED ORAL
Qty: 30 TABLET | Refills: 1 | Status: SHIPPED | OUTPATIENT
Start: 2023-01-16

## 2023-01-16 NOTE — ASSESSMENT & PLAN NOTE
Recommend re-establish with neurology, has upcoming appointment   Will start amitriptyline 10 mg HS and Maxalt PRN, also start magnesium supplement   Discussed importance of diet and lifestyle interventions

## 2023-01-16 NOTE — PROGRESS NOTES
Name: Raisa Pearl      : 1990      MRN: [id number]  Encounter Provider: LEIGHANN Quiñonez  Encounter Date: 2023   Encounter department: [address] [place] [address]     1  Chronic migraine without aura with status migrainosus, not intractable  Assessment & Plan:  Recommend re-establish with neurology, has upcoming appointment   Will start amitriptyline 10 mg HS and Maxalt PRN, also start magnesium supplement   Discussed importance of diet and lifestyle interventions    Orders:  -     amitriptyline (ELAVIL) 10 mg tablet; Take 1 tablet (10 mg total) by mouth daily at bedtime  -     rizatriptan (Maxalt) 10 mg tablet; Take 1 tablet (10 mg total) by mouth as needed for migraine Take at the onset of migraine; if symptoms continue or return, may take another dose at least 2 hours after first dose  Take no more than 2 doses in a day  -     Magnesium 400 MG TABS; Take 1 tablet (400 mg total) by mouth in the morning    2  Antiphospholipid antibody positive  -     Ambulatory Referral to Hematology / Oncology; Future  -     Protime-INR; Future  -     APTT; Future  -     Protein electrophoresis, serum; Future    3  Lyme arthritis ([facility] [address] )  -     Vitamin B12; Future  -     Lyme Antibody Profile with reflex to WB; Future    BMI Counseling: Body mass index is 31 23 kg/m²  The BMI is above normal  Nutrition recommendations include consuming healthier snacks and limiting drinks that contain sugar  Rationale for BMI follow-up plan is due to patient being overweight or obese  Subjective     32 YO female PMH Lyme arthritis here for follow up  Reports that she is having daily generalized HA, sometimes associated with nausea  Also c/o bruising, intermittent numbness and tingling of the left leg  She is taking ibuprofen, but this is not really effective  She has stopped all other medications and has not seen neuro for >1 year   Was following with rheum and had work showing possible antiphospholipid syndrome  She started taking daily aspirin at the recommendation of rheumatology  Review of Systems   Constitutional: Negative for chills and fever  HENT: Negative for ear pain and sore throat  Eyes: Negative for pain and visual disturbance  Respiratory: Negative for cough and shortness of breath  Cardiovascular: Negative for chest pain and palpitations  Gastrointestinal: Negative for abdominal pain and vomiting  Genitourinary: Negative for dysuria and hematuria  Musculoskeletal: Positive for arthralgias  Negative for back pain  Skin: Negative for color change and rash  Neurological: Positive for numbness and headaches  Negative for seizures and syncope  All other systems reviewed and are negative        Past Medical History:   Diagnosis Date   • Asthma    • Lyme disease    • Migraines      Past Surgical History:   Procedure Laterality Date   • WISDOM TOOTH EXTRACTION       Family History   Problem Relation Age of Onset   • Heart attack Mother    • Hypertension Mother    • Cancer Mother         leukemia   • No Known Problems Father    • Hypertension Maternal Grandmother    • Diabetes Maternal Grandmother    • Breast cancer Neg Hx    • Colon cancer Neg Hx    • Ovarian cancer Neg Hx      Social History     Socioeconomic History   • Marital status: Single     Spouse name: None   • Number of children: None   • Years of education: None   • Highest education level: None   Occupational History   • None   Tobacco Use   • Smoking status: Former     Types: Cigarettes     Quit date: 10/2021     Years since quittin 2   • Smokeless tobacco: Never   Vaping Use   • Vaping Use: Some days   • Substances: Nicotine   Substance and Sexual Activity   • Alcohol use: Not Currently     Comment: couple times/month   • Drug use: Never   • Sexual activity: Yes     Partners: Male     Birth control/protection: OCP   Other Topics Concern   • None   Social History Narrative ** Merged History Encounter **         Flu shot:no     Social Determinants of Health     Financial Resource Strain: Low Risk    • Difficulty of Paying Living Expenses: Not hard at all   Food Insecurity: No Food Insecurity   • Worried About [address] [place] in the Last Year: Never true   • Ran Out of Food in the Last Year: Never true   Transportation Needs: No Transportation Needs   • Lack of Transportation (Medical): No   • Lack of Transportation (Non-Medical):  No   Physical Activity: Not on file   Stress: Not on file   Social Connections: Not on file   Intimate Partner Violence: Not on file   Housing Stability: Not on file     Current Outpatient Medications on File Prior to Visit   Medication Sig   • al mag oxide-diphenhydramine-lidocaine viscous (MAGIC MOUTHWASH) 1:1:1 suspension Swish and spit 10 mL every 4 (four) hours as needed for mouth pain or discomfort   • cetirizine (ZyrTEC) 10 mg tablet TAKE 1 TABLET BY MOUTH DAILY   • Isibloom 0 15-30 MG-MCG per tablet Take 1 tablet by mouth daily   • linaCLOtide (Linzess) 290 MCG CAPS Take 1 capsule by mouth in the morning   • meloxicam (MOBIC) 15 mg tablet TAKE 1 TABLET(15 MG) BY MOUTH DAILY   • pseudoephedrine (SUDAFED) 30 mg tablet Take 1 tablet (30 mg total) by mouth every 8 (eight) hours as needed for congestion for up to 20 doses     Allergies   Allergen Reactions   • Apple - Food Allergy Anaphylaxis     Immunization History   Administered Date(s) Administered   • COVID-19 MODERNA VACC 0 5 ML IM 04/30/2021, 05/27/2021   • HPV9 05/31/2017, 10/04/2017   • INFLUENZA 10/19/2010   • Influenza, injectable, quadrivalent, preservative free 0 5 mL 11/21/2019, 09/24/2020   • Tdap 05/19/2011, 07/02/2021       Objective     /84 (BP Location: Left arm, Patient Position: Sitting, Cuff Size: Adult)   Pulse 85   Temp 97 8 °F (36 6 °C) (Temporal)   Resp 16   Ht 4' 9" (1 448 m)   Wt 65 5 kg (144 lb 4 8 oz)   LMP  (LMP Unknown) Comment: pt on depo  SpO2 98%   BMI 31 23 kg/m²     Physical Exam  Vitals and nursing note reviewed  Constitutional:       General: She is not in acute distress  Appearance: She is well-developed  She is not diaphoretic  HENT:      Head: Normocephalic and atraumatic  Right Ear: External ear normal       Left Ear: External ear normal    Eyes:      Conjunctiva/sclera: Conjunctivae normal       Pupils: Pupils are equal, round, and reactive to light  Cardiovascular:      Rate and Rhythm: Normal rate and regular rhythm  Pulmonary:      Effort: Pulmonary effort is normal  No respiratory distress  Breath sounds: Normal breath sounds  No wheezing  Abdominal:      General: Bowel sounds are normal  There is no distension  Palpations: Abdomen is soft  Tenderness: There is no abdominal tenderness  Musculoskeletal:         General: No deformity  Normal range of motion  Cervical back: Normal range of motion and neck supple  Lymphadenopathy:      Cervical: No cervical adenopathy  Skin:     General: Skin is warm and dry  Capillary Refill: Capillary refill takes less than 2 seconds  Findings: Bruising (left lateral leg ) present  Neurological:      General: No focal deficit present  Mental Status: She is alert and oriented to person, place, and time  Cranial Nerves: No cranial nerve deficit  Sensory: No sensory deficit  Motor: No weakness        Coordination: Coordination normal       Gait: Gait normal       Deep Tendon Reflexes: Reflexes normal    Psychiatric:         Behavior: Behavior normal        LEIGHANN Nicolas

## 2023-01-17 LAB — B BURGDOR IGG+IGM SER-ACNC: >8 AI

## 2023-01-18 LAB
ALBUMIN SERPL ELPH-MCNC: 5.81 G/DL (ref 3.5–5)
ALBUMIN SERPL ELPH-MCNC: 76.4 % (ref 52–65)
ALPHA1 GLOB SERPL ELPH-MCNC: 0.3 G/DL (ref 0.1–0.4)
ALPHA1 GLOB SERPL ELPH-MCNC: 4 % (ref 2.5–5)
ALPHA2 GLOB SERPL ELPH-MCNC: 0.52 G/DL (ref 0.4–1.2)
ALPHA2 GLOB SERPL ELPH-MCNC: 6.8 % (ref 7–13)
B BURGDOR IGG PATRN SER IB-IMP: POSITIVE
B BURGDOR IGM PATRN SER IB-IMP: NEGATIVE
B BURGDOR18KD IGG SER QL IB: PRESENT
B BURGDOR23KD IGG SER QL IB: ABNORMAL
B BURGDOR23KD IGM SER QL IB: ABNORMAL
B BURGDOR28KD IGG SER QL IB: PRESENT
B BURGDOR30KD IGG SER QL IB: PRESENT
B BURGDOR39KD IGG SER QL IB: PRESENT
B BURGDOR39KD IGM SER QL IB: ABNORMAL
B BURGDOR41KD IGG SER QL IB: PRESENT
B BURGDOR41KD IGM SER QL IB: ABNORMAL
B BURGDOR45KD IGG SER QL IB: ABNORMAL
B BURGDOR58KD IGG SER QL IB: PRESENT
B BURGDOR66KD IGG SER QL IB: PRESENT
B BURGDOR93KD IGG SER QL IB: PRESENT
BETA GLOB ABNORMAL SERPL ELPH-MCNC: 0.24 G/DL (ref 0.4–0.8)
BETA1 GLOB SERPL ELPH-MCNC: 3.2 % (ref 5–13)
BETA2 GLOB SERPL ELPH-MCNC: 3.2 % (ref 2–8)
BETA2+GAMMA GLOB SERPL ELPH-MCNC: 0.24 G/DL (ref 0.2–0.5)
GAMMA GLOB ABNORMAL SERPL ELPH-MCNC: 0.49 G/DL (ref 0.5–1.6)
GAMMA GLOB SERPL ELPH-MCNC: 6.4 % (ref 12–22)
IGG/ALB SER: 3.24 {RATIO} (ref 1.1–1.8)
PROT PATTERN SERPL ELPH-IMP: ABNORMAL
PROT SERPL-MCNC: 7.6 G/DL (ref 6.4–8.2)

## 2023-01-19 ENCOUNTER — TELEPHONE (OUTPATIENT)
Dept: INFECTIOUS DISEASES | Facility: CLINIC | Age: 33
End: 2023-01-19

## 2023-01-19 NOTE — TELEPHONE ENCOUNTER
patient calls office in regards to scheduling an appt  Referral in chart  Informed pt that we are waiting on additional information from the referring provider  After receiving the information we will reach out to her about scheduling  Pt verbalizes understanding and gives thanks

## 2023-01-23 ENCOUNTER — TELEPHONE (OUTPATIENT)
Dept: INFECTIOUS DISEASES | Facility: CLINIC | Age: 33
End: 2023-01-23

## 2023-01-23 NOTE — TELEPHONE ENCOUNTER
Patient calls back  Informed her of IDSA guidelines  Patient has had three courses of doxy and is still fatigue and having joint issues  Patient would still like to know what to do next as patient has been having these symptoms recur      CB: [phone]

## 2023-01-23 NOTE — TELEPHONE ENCOUNTER
Called pt today and lmom regarding referral      Informed pt to call the office back regarding the referral

## 2023-01-24 DIAGNOSIS — I73.00 RAYNAUD'S DISEASE WITHOUT GANGRENE: Primary | ICD-10-CM

## 2023-01-24 RX ORDER — AMLODIPINE BESYLATE 2.5 MG/1
2.5 TABLET ORAL DAILY
Qty: 30 TABLET | Refills: 6 | Status: SHIPPED | OUTPATIENT
Start: 2023-01-24 | End: 2023-01-24

## 2023-01-24 NOTE — TELEPHONE ENCOUNTER
Called and spoke with pt regarding referral      Informed pt of the IDSA guidelines that our office provided yesterday  Informed her Harry Credit in the office got back to staff and states there is not treatment ID would have for the pt  Per Amaury Quintanilla recommenced pt to follow up wit rheumatology